# Patient Record
Sex: MALE | Race: WHITE | NOT HISPANIC OR LATINO | Employment: OTHER | URBAN - METROPOLITAN AREA
[De-identification: names, ages, dates, MRNs, and addresses within clinical notes are randomized per-mention and may not be internally consistent; named-entity substitution may affect disease eponyms.]

---

## 2017-04-04 ENCOUNTER — GENERIC CONVERSION - ENCOUNTER (OUTPATIENT)
Dept: OTHER | Facility: OTHER | Age: 72
End: 2017-04-04

## 2017-04-04 LAB — GLUCOSE SERPL-MCNC: 102 MG/DL

## 2017-04-10 ENCOUNTER — ALLSCRIPTS OFFICE VISIT (OUTPATIENT)
Dept: OTHER | Facility: OTHER | Age: 72
End: 2017-04-10

## 2017-08-07 DIAGNOSIS — R60.9 EDEMA: ICD-10-CM

## 2017-08-07 DIAGNOSIS — M10.9 GOUT: ICD-10-CM

## 2017-08-07 DIAGNOSIS — I12.9 HYPERTENSIVE CHRONIC KIDNEY DISEASE WITH STAGE 1 THROUGH STAGE 4 CHRONIC KIDNEY DISEASE, OR UNSPECIFIED CHRONIC KIDNEY DISEASE: ICD-10-CM

## 2017-08-07 DIAGNOSIS — M16.9 OSTEOARTHRITIS OF HIP: ICD-10-CM

## 2017-08-08 ENCOUNTER — LAB CONVERSION - ENCOUNTER (OUTPATIENT)
Dept: OTHER | Facility: OTHER | Age: 72
End: 2017-08-08

## 2017-08-08 LAB
A/G RATIO (HISTORICAL): 1.7 (CALC) (ref 1–2.5)
ALBUMIN SERPL BCP-MCNC: 3.7 G/DL (ref 3.6–5.1)
ALP SERPL-CCNC: 48 U/L (ref 40–115)
ALT SERPL W P-5'-P-CCNC: 16 U/L (ref 9–46)
AST SERPL W P-5'-P-CCNC: 12 U/L (ref 10–35)
BILIRUB SERPL-MCNC: 0.9 MG/DL (ref 0.2–1.2)
BUN SERPL-MCNC: 18 MG/DL (ref 7–25)
BUN/CREA RATIO (HISTORICAL): 12 (CALC) (ref 6–22)
CALCIUM SERPL-MCNC: 8.9 MG/DL (ref 8.6–10.3)
CHLORIDE SERPL-SCNC: 107 MMOL/L (ref 98–110)
CO2 SERPL-SCNC: 27 MMOL/L (ref 20–31)
CREAT SERPL-MCNC: 1.51 MG/DL (ref 0.7–1.18)
DEPRECATED RDW RBC AUTO: 13.1 % (ref 11–15)
EGFR AFRICAN AMERICAN (HISTORICAL): 53 ML/MIN/1.73M2
EGFR-AMERICAN CALC (HISTORICAL): 46 ML/MIN/1.73M2
GAMMA GLOBULIN (HISTORICAL): 2.2 G/DL (CALC) (ref 1.9–3.7)
GLUCOSE (HISTORICAL): 92 MG/DL (ref 65–99)
HCT VFR BLD AUTO: 38.2 % (ref 38.5–50)
HGB BLD-MCNC: 12.8 G/DL (ref 13.2–17.1)
MCH RBC QN AUTO: 29.8 PG (ref 27–33)
MCHC RBC AUTO-ENTMCNC: 33.5 G/DL (ref 32–36)
MCV RBC AUTO: 89 FL (ref 80–100)
PLATELET # BLD AUTO: 287 THOUSAND/UL (ref 140–400)
PMV BLD AUTO: 9.2 FL (ref 7.5–12.5)
POTASSIUM SERPL-SCNC: 4.2 MMOL/L (ref 3.5–5.3)
RBC # BLD AUTO: 4.29 MILLION/UL (ref 4.2–5.8)
SODIUM SERPL-SCNC: 141 MMOL/L (ref 135–146)
TOTAL PROTEIN (HISTORICAL): 5.9 G/DL (ref 6.1–8.1)
URIC ACID (HISTORICAL): 7.4 MG/DL (ref 4–8)
WBC # BLD AUTO: 5.5 THOUSAND/UL (ref 3.8–10.8)

## 2017-08-28 ENCOUNTER — ALLSCRIPTS OFFICE VISIT (OUTPATIENT)
Dept: OTHER | Facility: OTHER | Age: 72
End: 2017-08-28

## 2017-11-16 ENCOUNTER — LAB CONVERSION - ENCOUNTER (OUTPATIENT)
Dept: OTHER | Facility: OTHER | Age: 72
End: 2017-11-16

## 2017-11-16 LAB
A/G RATIO (HISTORICAL): 1.7 (CALC) (ref 1–2.5)
ALBUMIN SERPL BCP-MCNC: 4 G/DL (ref 3.6–5.1)
ALP SERPL-CCNC: 56 U/L (ref 40–115)
ALT SERPL W P-5'-P-CCNC: 15 U/L (ref 9–46)
AST SERPL W P-5'-P-CCNC: 14 U/L (ref 10–35)
BILIRUB SERPL-MCNC: 1.1 MG/DL (ref 0.2–1.2)
BUN SERPL-MCNC: 17 MG/DL (ref 7–25)
BUN/CREA RATIO (HISTORICAL): 11 (CALC) (ref 6–22)
CALCIUM SERPL-MCNC: 9.2 MG/DL (ref 8.6–10.3)
CHLORIDE SERPL-SCNC: 106 MMOL/L (ref 98–110)
CO2 SERPL-SCNC: 27 MMOL/L (ref 20–31)
CREAT SERPL-MCNC: 1.52 MG/DL (ref 0.7–1.18)
DEPRECATED RDW RBC AUTO: 12.7 % (ref 11–15)
EGFR AFRICAN AMERICAN (HISTORICAL): 53 ML/MIN/1.73M2
EGFR-AMERICAN CALC (HISTORICAL): 45 ML/MIN/1.73M2
GAMMA GLOBULIN (HISTORICAL): 2.3 G/DL (CALC) (ref 1.9–3.7)
GLUCOSE (HISTORICAL): 93 MG/DL (ref 65–99)
HCT VFR BLD AUTO: 40.8 % (ref 38.5–50)
HGB BLD-MCNC: 14 G/DL (ref 13.2–17.1)
MCH RBC QN AUTO: 30.6 PG (ref 27–33)
MCHC RBC AUTO-ENTMCNC: 34.3 G/DL (ref 32–36)
MCV RBC AUTO: 89.1 FL (ref 80–100)
PLATELET # BLD AUTO: 291 THOUSAND/UL (ref 140–400)
PMV BLD AUTO: 9.1 FL (ref 7.5–12.5)
POTASSIUM SERPL-SCNC: 4.3 MMOL/L (ref 3.5–5.3)
RBC # BLD AUTO: 4.58 MILLION/UL (ref 4.2–5.8)
SODIUM SERPL-SCNC: 139 MMOL/L (ref 135–146)
TOTAL PROTEIN (HISTORICAL): 6.3 G/DL (ref 6.1–8.1)
WBC # BLD AUTO: 6.1 THOUSAND/UL (ref 3.8–10.8)

## 2017-12-04 ENCOUNTER — ALLSCRIPTS OFFICE VISIT (OUTPATIENT)
Dept: OTHER | Facility: OTHER | Age: 72
End: 2017-12-04

## 2017-12-05 NOTE — PROGRESS NOTES
Assessment    1  Gouty arthropathy (274 00) (M10 9)   2  Peripheral edema (782 3) (R60 9)   3  Benign hypertension with chronic kidney disease, stage III (403 10,585 3) (I12 9,N18 3)   4  Degenerative arthritis of hip (715 95) (M16 9)    Plan  Benign hypertension with chronic kidney disease, stage III, Gouty arthropathy, Peripheral edema    · (1) CBC/ PLT (NO DIFF); Status:Active; Requested for:01May2018;    · (1) COMPREHENSIVE METABOLIC PANEL; Status:Active; Requested for:01May2018;    · (1) URIC ACID; Status:Active; Requested for:01May2018;   PMH: Screening for depression    · *VB-Depression Screening ; every 1 year; Last 76WTR4092; Next 74CMZ9380; Status:Active  PMH: Screening for genitourinary condition    · *VB - Urinary Incontinence Screen (Dx Z13 89 Screen for UI) ; every 1 year; Last 06FWZ1694; ICKS65TGN7000; Status:Active  PMH: Screening for malignant neoplasm of colon    · COLONOSCOPY; Status:Active; Requested for:04Dec2017;   PMH: Screening for neurological condition    · Falls Risk Assessment (Dx Z13 89 Screen for Neurologic Disorder) ; every 1 year; Last 62Pph1565;Next 41ECR7395; Status:Active  Screening for genitourinary condition    · *VB - Urinary Incontinence Screen (Dx Z13 89 Screen for UI); Status:Complete - Retrospective ByProtocol Authorization;   Done: 60FWA4148 10:20AM    Discussion/Summary  Discussion Summary:   Medications were renewed  The patient was given prescriptions for follow-up lab work  He will check with his insurer recurrent coverage for Zostavax  He will receive a Prevnar 13 immunization at his next office visit  Chief Complaint  Chief Complaint Free Text Note Form: Patient is here for 4 months f/u for Hypertension, arthritis, Gouty arthropathy and peripheral edema  blood work  does not have any new complains at the moment  Chief Complaint Chronic Condition [facility]: Patient is here today for follow up of chronic conditions described in HPI        History of Present Illness  HPI: Patient presents for follow-up visit and states to be doing well  His arthritic hips are feeling better  His peripheral edema has improved  His gouty arthropathy symptoms have improved with the addition of allopurinol  He denies any problems with his antihypertensive medications  He denies any dizziness, headaches, visual symptoms, numbness tingling or weakness of his extremities, difficulty speaking  Appetite is good  Urination is good he denies any problems with nocturia  His edema is stable  Labs are reviewed  Renal function has remained essentially unchanged  BUN is 17 creatinine 1 52  His most recent uric acid level was 7 4  Fasting glucose 93  CBC is stable white count 6100 hemoglobin 14  Platelets [id number]      Review of Systems  Complete-Male:  Constitutional: No fever or chills, feels well, no tiredness, no recent weight gain or weight loss  Eyes: No complaints of eye pain, no red eyes, no discharge from eyes, no itchy eyes  ENT: no complaints of earache, no hearing loss, no nosebleeds, no nasal discharge, no sore throat, no hoarseness  Cardiovascular: lower extremity edema  Respiratory: No complaints of shortness of breath, no wheezing, no cough, no SOB on exertion, no orthopnea or PND  Gastrointestinal: No complaints of abdominal pain, no constipation, no nausea or vomiting, no diarrhea or bloody stools  Genitourinary: No complaints of dysuria, no incontinence, no hesitancy, no nocturia, no genital lesion, no testicular pain  Musculoskeletal: arthralgias-- and-- In general, hips are improved  Gouty arthropathy has resolved  Integumentary: No complaints of skin rash or skin lesions, no itching, no skin wound, no dry skin  Neurological: No compliants of headache, no confusion, no convulsions, no numbness or tingling, no dizziness or fainting, no limb weakness, no difficulty walking    Psychiatric: Is not suicidal, no sleep disturbances, no anxiety or depression, no change in personality, no emotional problems  Endocrine: No complaints of proptosis, no hot flashes, no muscle weakness, no erectile dysfunction, no deepening of the voice, no feelings of weakness  Active Problems  1  Benign hypertension with chronic kidney disease, stage III (403 10,585 3) (I12 9,N18 3)   2  Degenerative arthritis of hip (715 95) (M16 9)   3  Gouty arthropathy (274 00) (M10 9)   4  Osteoarthritis (715 90) (M19 90)   5  Peripheral edema (782 3) (R60 9)   6  PVC (premature ventricular contraction) (427 69) (I49 3)   7  Screening for genitourinary condition (V81 6) (Z13 89)    Past Medical History  1  History of Acute bronchitis (466 0) (J20 9)   2  History of renal calculi (V13 01) (Z87 442)   3  History of Impacted cerumen of both ears (380 4) (H61 23)   4  History of Left thigh pain (729 5) (M79 652)   5  History of Right otitis media (382 9) (H66 91)   6  History of Urinary problem (V47 4) (R39 89)   7  History of Uveitis (364 3) (H20 9)  Active Problems And Past Medical History Reviewed: The active problems and past medical history were reviewed and updated today  Surgical History  1  History of Tonsillectomy    Family History  Mother    1  Family history of malignant neoplasm of breast (V16 3) (Z80 3)  Father    2  Family history of coronary artery disease (V17 3) (Z82 49)  Family History Reviewed: The family history was reviewed and updated today  Social History     · Current some day smoker (305 1) (F17 200)   · No alcohol use   · No illicit drug use   · Tobacco use (305 1) (Z72 0)  Social History Reviewed: The social history was reviewed and updated today  The social history was reviewed and is unchanged  Current Meds   1  Allopurinol 100 MG Oral Tablet; TAKE 1 TABLET EVERY MORNING; Therapy: 23Ztd9202 to (Evaluate:27Qid5366)  Requested for: 41FJI1859; Last Rx:04Oct2017 Ordered   2  Furosemide 20 MG Oral Tablet; TAKE 1 TABLET DAILY;  Therapy: 64Now5875 to (Evaluate:30Jan2018)  Requested for: 83DRZ2532; Last Rx:47Xvr8540 Ordered   3  Lisinopril 5 MG Oral Tablet; take 1 tablet every day; Therapy: 02Iou2126 to (Rose Bear)  Requested for: 44OGI2956; Last Rx:06Nov2017 Ordered   4  Potassium Chloride ER 10 MEQ Oral Capsule Extended Release; TAKE 1 CAPSULE DAILY; Therapy: 93Kyt1888 to (Evaluate:30Apr2018)  Requested for: 19TEN5464; Last Rx:01Nov2017; Status: ACTIVE - Renewal Denied Ordered   5  Tamsulosin HCl - 0 4 MG Oral Capsule; take 1 capsule daily; Therapy: (Rachelle Sat) to Recorded   6  Tramadol-Acetaminophen 37 5-325 MG Oral Tablet; TAKE 2 TABLET Every 6 hours PRN; Therapy: 49WLS6911 to (Evaluate:09Nov2017); Last Rx:01Wbc6762 Ordered  Medication List Reviewed: The medication list was reviewed and updated today  Allergies  1  No Known Drug Allergies    Vitals  Vital Signs    Recorded: 82UKT1805 09:59AM   Temperature 98 3 F, Oral   Heart Rate 88   Systolic 955, LUE, Sitting   Diastolic 74, LUE, Sitting   Height 5 ft 7 in   Weight 200 lb 6 oz   BMI Calculated 31 38   BSA Calculated 2 02   O2 Saturation 97       Physical Exam   Constitutional  General appearance: No acute distress, well appearing and well nourished  Eyes  Conjunctiva and lids: No swelling, erythema, or discharge  Pupils and irises: Equal, round and reactive to light  Ears, Nose, Mouth, and Throat  External inspection of ears and nose: Normal    Pulmonary  Respiratory effort: No increased work of breathing or signs of respiratory distress  Auscultation of lungs: Clear to auscultation, equal breath sounds bilaterally, no wheezes, no rales, no rhonci  Cardiovascular  Palpation of heart: Normal PMI, no thrills  Auscultation of heart: Normal rate and rhythm, normal S1 and S2, without murmurs  Examination of extremities for edema and/or varicosities: Abnormal  -- 1+ pretibial edema on the left, trace pretibial edema  Carotid pulses: Normal    Abdomen  Abdomen: Non-tender, no masses     Lymphatic  Palpation of lymph nodes in neck: No lymphadenopathy  Musculoskeletal  Gait and station: Normal    Digits and nails: Normal without clubbing or cyanosis  Inspection/palpation of joints, bones, and muscles: Normal    Skin  Skin and subcutaneous tissue: Normal without rashes or lesions  Neurologic  Cranial nerves: Cranial nerves 2-12 intact  Reflexes: 2+ and symmetric  Sensation: No sensory loss  Psychiatric  Orientation to person, place and time: Normal    Mood and affect: Normal          Results/Data  PHQ-2 Adult Depression Screening 88RPK3231 10:21AM User, [employer]     Test Name Result Flag Reference   PHQ-2 Adult Depression Score 0       Over the last two weeks, how often have you been bothered by any of the following problems? Little interest or pleasure in doing things: Not at all - 0 Feeling down, depressed, or hopeless: Not at all - 0   PHQ-2 Adult Depression Screening Negative       Falls Risk Assessment (Dx Z13 89 Screen for Neurologic Disorder) 74ZHO0851 10:21AM User, [employer]     Test Name Result Flag Reference   Falls Risk      No falls in the past year     *VB - Urinary Incontinence Screen (Dx Z13 89 Screen for UI) 93OEM6046 10:20AM [place]     Test Name Result Flag Reference   Urinary Incontinence Assessment 35JXN4706       (1) COMPREHENSIVE METABOLIC PANEL 06PJA0587 56:96OP Johnye [place]     Test Name Result Flag Reference   GLUCOSE 93 mg/dL  65-99   Fasting reference interval   UREA NITROGEN (BUN) 17 mg/dL  7-25   CREATININE 1 52 mg/dL H 0 70-1 18     For patients >52years of age, the reference limit for Creatinine is approximately 13% higher for people identified as -American  eGFR NON-AFR   AMERICAN 45 mL/min/1 73m2 L > OR = 60   eGFR AFRICAN AMERICAN 53 mL/min/1 73m2 L > OR = 60   BUN/CREATININE RATIO 11 (calc)  6-22   SODIUM 139 mmol/L  135-146   POTASSIUM 4 3 mmol/L  3 5-5 3   CHLORIDE 106 mmol/L     CARBON DIOXIDE 27 mmol/L  20-31   CALCIUM 9 2 mg/dL  8 6-10 3   PROTEIN, TOTAL 6 3 g/dL  6 1-8 1   ALBUMIN 4 0 g/dL  3 6-5 1   GLOBULIN 2 3 g/dL (calc)  1 9-3 7   ALBUMIN/GLOBULIN RATIO 1 7 (calc)  1 0-2 5   BILIRUBIN, TOTAL 1 1 mg/dL  0 2-1 2   ALKALINE PHOSPHATASE 56 U/L     AST 14 U/L  10-35   ALT 15 U/L  9-46     (Q) CBC (H/H, RBC, INDICES, WBC, PLT) 32EGE3666 07:05AM Urbano Bird     REPORT COMMENT: FASTING:YES     Test Name Result Flag Reference   WHITE BLOOD CELL COUNT 6 1 Thousand/uL  3 8-10 8   RED BLOOD CELL COUNT 4 58 Million/uL  4 20-5 80   HEMOGLOBIN 14 0 g/dL  13 2-17 1   HEMATOCRIT 40 8 %  38 5-50 0   MCV 89 1 fL  80 0-100 0   MCH 30 6 pg  27 0-33 0   MCHC 34 3 g/dL  32 0-36 0   RDW 12 7 %  11 0-15 0   PLATELET COUNT 969 Thousand/uL  140-400   MPV 9 1 fL  7 5-12 5       Health Management  History of Screening for depression   *VB-Depression Screening; every 1 year; Last 08MRT5337; Next Due: 71MNF1684; Active  History of Screening for genitourinary condition   *VB - Urinary Incontinence Screen (Dx Z13 89 Screen for UI); every 1 year; Last 60YEK2692; NextDue: 57AMT5905; Active  History of Screening for neurological condition   Falls Risk Assessment (Dx Z13 89 Screen for Neurologic Disorder); every 1 year; Last 14Xud8306;Next Due: 88FKL5617; Active  Health Maintenance   Medicare Annual Wellness Visit; every 1 year; Last 63YAR1291; Next Due: 66FOO1032;  Overdue    Future Appointments    Date/Time Provider Specialty Site   05/14/2018 09:15 AM [facility]arg       Signatures   Electronically signed by : Siena Mejia MD; Dec  4 2017 11:06AM EST                       (Author)

## 2018-01-13 VITALS
BODY MASS INDEX: 32.98 KG/M2 | HEIGHT: 67 IN | TEMPERATURE: 98.7 F | OXYGEN SATURATION: 98 % | WEIGHT: 210.13 LBS | SYSTOLIC BLOOD PRESSURE: 142 MMHG | DIASTOLIC BLOOD PRESSURE: 82 MMHG | HEART RATE: 89 BPM

## 2018-01-14 VITALS
HEIGHT: 67 IN | HEART RATE: 85 BPM | OXYGEN SATURATION: 95 % | TEMPERATURE: 98.5 F | WEIGHT: 207.13 LBS | BODY MASS INDEX: 32.51 KG/M2

## 2018-01-23 VITALS
WEIGHT: 200.38 LBS | DIASTOLIC BLOOD PRESSURE: 74 MMHG | OXYGEN SATURATION: 97 % | HEIGHT: 67 IN | BODY MASS INDEX: 31.45 KG/M2 | HEART RATE: 88 BPM | TEMPERATURE: 98.3 F | SYSTOLIC BLOOD PRESSURE: 120 MMHG

## 2018-01-23 NOTE — PROGRESS NOTES
Assessment   1  Gouty arthropathy (274 00) (M10 9)  2  Peripheral edema (782 3) (R60 9)  3  Benign hypertension with chronic kidney disease, stage III (403 10,585 3) (I12 9,N18 3)  4  Degenerative arthritis of hip (715 95) (M16 9)    Plan  Benign hypertension with chronic kidney disease, stage III, Gouty arthropathy, Peripheral  edema    · (1) CBC/ PLT (NO DIFF); Status:Active; Requested for:01May2018;    · (1) COMPREHENSIVE METABOLIC PANEL; Status:Active; Requested for:01May2018;    · (1) URIC ACID; Status:Active; Requested for:01May2018;   PMH: Screening for depression    · *VB-Depression Screening ; every 1 year; Last 82ZBO6425; Next 10HVX0330;  Status:Active  PMH: Screening for genitourinary condition    · *VB - Urinary Incontinence Screen (Dx Z13 89 Screen for UI) ; every 1 year; Last  50ROF1063; Next 69PGV1298; Status:Active  PMH: Screening for malignant neoplasm of colon    · COLONOSCOPY; Status:Active; Requested for:44Syl7782;   PMH: Screening for neurological condition    · Falls Risk Assessment (Dx Z13 89 Screen for Neurologic Disorder) ; every 1 year; Last  70NWB1460; Next 71TVI5260; Status:Active  Screening for genitourinary condition    · *VB - Urinary Incontinence Screen (Dx Z13 89 Screen for UI); Status:Complete -  Retrospective By Protocol Authorization;   Done: 33TOY7932 10:20AM    Discussion/Summary  Impression: Subsequent Annual Wellness Visit  Cardiovascular screening and counseling: the risks and benefits of screening were discussed and screening is current  Diabetes screening and counseling: the risks and benefits of screening were discussed and screening is current  Colorectal cancer screening and counseling: the risks and benefits of screening were discussed and screening is current  Prostate cancer screening and counseling: the risks and benefits of screening were discussed and screening is current     Osteoporosis screening and counseling: the risks and benefits of screening were discussed and screening not indicated  Abdominal aortic aneurysm screening and counseling: the risks and benefits of screening were discussed and screening not indicated  Glaucoma screening and counseling: the risks and benefits of screening were discussed and screening is current  HIV screening and counseling: the risks and benefits of screening were discussed and screening is current  Immunizations: the risks and benefits of influenza vaccination were discussed with the patient, influenza vaccine is up to date this year, the risks and benefits of pneumococcal vaccination were discussed with the patient, Needs Prevnar 13, hepatitis B prevention counseling was provided, hepatitis B vaccination series is not indicated at this time due to the patient's low risk of sylvie the disease, the risks and benefits of the Zostavax vaccine were discussed with the patient, the patient declines the Zostavax vaccine, Zostavax vaccine needed today, Will check with insurer, the risks and benefits of the Td vaccine were discussed with the patient, the patient declines the Td vaccine, Td vaccine needed today, the risks and benefits of the Tdap vaccine were discussed with the patient, the patient declines the Tdap vaccine and Tdap vaccine needed today  Patient Discussion: follow-up visit needed in 1 year  Chief Complaint  Patient is here for AWV exam       History of Present Illness  Welcome to Medicare and Wellness Visits: The patient is being seen for the initial annual wellness visit  Medicare Screening and Risk Factors   Hospitalizations: no previous hospitalizations  Medicare Screening Tests Risk Questions   Abdominal aortic aneurysm risk assessment: none indicated  Osteoporosis risk assessment: over 48years of age, but none indicated  HIV risk assessment: none indicated  Drug and Alcohol Use: The patient has never smoked cigarettes  The patient reports never drinking alcohol  Alcohol concern:    The patient has no concerns about alcohol abuse  He has never used illicit drugs  Diet and Physical Activity: Current diet includes unhealthy food choices, limited junk food, 1 servings of fruit per day, 1 cups of coffee per day and 1 cans of diet soda per day  Exercise: walking 45 minutes per day  Mood Disorder and Cognitive Impairment Screening: PHQ-9 Depression Scale   Over the past 2 weeks, how often have you been bothered by the following problems? 1 ) Little interest or pleasure in doing things? Not at all    2 ) Feeling down, depressed or hopeless? Not at all    3 ) Trouble falling asleep or sleeping too much? Several days  4 ) Feeling tired or having little energy? Several days  5 ) Poor appetite or overeating? Not at all    6 ) Feeling bad about yourself, or that you are a failure, or have let yourself or your family down? Not at all    7 ) Trouble concentrating on things, such as reading a newspaper or watching television? Not at all    8 ) Moving or speaking so slowly that other people could have noticed, or the opposite, moving or speaking faster than usual? Not at all    9 ) Thoughts that you would be off dead or of hurting yourself in some way? Not at all  TOTAL SCORE: 2  Functional Ability/Level of Safety: Hearing is slightly decreased  He does not use a hearing aid  The patient is currently able to do activities of daily living without limitations, able to do instrumental activities of daily living without limitations, able to participate in social activities without limitations and able to drive without limitations  Activities of daily living details: does not need help using the phone, no transportation help needed, does not need help shopping, no meal preparation help needed, does not need help doing housework, does not need help doing laundry, does not need help managing medications and does not need help managing money  Fall risk factors:   The patient fell 0 times in the past 12 months  Injury History: no polypharmacy, no alcohol use, no mobility impairment, no antidepressant use, no deconditioning, no postural hypotension, no sedative use, no visual impairment, no urinary incontinence, antihypertensive use, no cognitive impairment, up and go test was normal and no previous fall  Home safety risk factors:  no unfamiliar surroundings, no loose rugs, no uneven floors, no household clutter, grab bars in the bathroom and handrails on the stairs  Advance Directives: Advance directives: no living will, no durable power of  for health care directives and no advance directives  end of life decisions were reviewed with the patient and I agree with the patient's decisions  Co-Managers and Medical Equipment/Suppliers: See Patient Care Team      Patient Care Team    Care Team Member Role Specialty Office Number         Vida Welsh MD  Ophthalmology [phone]   Anneliese Romeo MD  Urology [phone]     Review of Systems    Constitutional: negative  Eyes: negative  ENT: negative  Cardiovascular: lower extremity edema  Respiratory: negative  Gastrointestinal: negative  Genitourinary: negative  Musculoskeletal: Hip pain  Integumentary and Breasts: negative  Neurological: negative  Psychiatric: negative  Endocrine: negative  Hematologic and Lymphatic: negative  Active Problems   1  Benign hypertension with chronic kidney disease, stage III (403 10,585 3) (I12 9,N18 3)  2  Degenerative arthritis of hip (715 95) (M16 9)  3  Gouty arthropathy (274 00) (M10 9)  4  Osteoarthritis (715 90) (M19 90)  5  Peripheral edema (782 3) (R60 9)  6  PVC (premature ventricular contraction) (427 69) (I49 3)  7   Screening for genitourinary condition (V81 6) (Z13 89)    Past Medical History    · History of Acute bronchitis (466 0) (J20 9)   · History of renal calculi (V13 01) (Z87 442)   · History of Impacted cerumen of both ears (380 4) (H61 23)   · History of Left thigh pain (729 5) (M79 652)   · History of Right otitis media (382 9) (H66 91)   · History of Urinary problem (V47 4) (R39 89)   · History of Uveitis (364 3) (H20 9)    Surgical History    · History of Tonsillectomy    Family History  Mother    · Family history of malignant neoplasm of breast (V16 3) (Z80 3)  Father    · Family history of coronary artery disease (V17 3) (Z82 49)    Social History    · Current some day smoker (305 1) (F17 200)   · No alcohol use   · No illicit drug use   · Tobacco use (305 1) (Z72 0)    Current Meds  1  Allopurinol 100 MG Oral Tablet; TAKE 1 TABLET EVERY MORNING; Therapy: 12Nzc3295 to (Evaluate:65Nut8225)  Requested for: 55OTQ2569; Last   Rx:48Rco6012 Ordered  2  Furosemide 20 MG Oral Tablet; TAKE 1 TABLET DAILY; Therapy: 17Bav3392 to (Evaluate:30Jan2018)  Requested for: 80Qfa3604; Last   Rx:54Xqf1867 Ordered  3  Lisinopril 5 MG Oral Tablet; take 1 tablet every day; [facility]44 [facility]  Requested for: 86JCY3225; Last   Rx:25Ttk2443 Ordered  4  Potassium Chloride ER 10 MEQ Oral Capsule Extended Release; TAKE 1 CAPSULE   DAILY; Therapy: 93Gwx0361 to (Evaluate:30Apr2018)  Requested for: 72DPD0364; Last   Rx:01Nov2017; Status: ACTIVE - Renewal Denied Ordered  5  Tamsulosin HCl - 0 4 MG Oral Capsule; take 1 capsule daily; Therapy: (Anup Fortune) to Recorded  6  Tramadol-Acetaminophen 37 5-325 MG Oral Tablet; TAKE 2 TABLET Every 6 hours PRN; Therapy: 67XOF6761 to (Evaluate:09Nov2017); Last Rx:91Gzn8413 Ordered    The medication list was reviewed and updated today  Allergies   1   No Known Drug Allergies    Immunizations   1 2 3    Influenza  Temporarily Deferred: Patient reports item recently done, As of: 29Yoc0691, Defer for 1 Years 16-Sep-2014 16-Oct-2015    PPSV  22-Sep-2014       Vitals  Signs    Temperature: 98 3 F, Oral  Heart Rate: 88  Systolic: 089, LUE, Sitting  Diastolic: 74, LUE, Sitting  Height: 5 ft 7 in  Weight: 200 lb 6 oz  BMI Calculated: 31 38  BSA Calculated: 2 02  O2 Saturation: 97    Results/Data  PHQ-2 Adult Depression Screening 85ABD2913 10:21AM User, [facility]     Test Name Result Flag Reference   PHQ-2 Adult Depression Score 0     Over the last two weeks, how often have you been bothered by any of the following problems? Little interest or pleasure in doing things: Not at all - 0  Feeling down, depressed, or hopeless: Not at all - 0   PHQ-2 Adult Depression Screening Negative       Falls Risk Assessment (Dx Z13 89 Screen for Neurologic Disorder) 46HUZ5531 10:21AM User, [facility]     Test Name Result Flag Reference   Falls Risk      No falls in the past year     *VB - Urinary Incontinence Screen (Dx Z13 89 Screen for UI) 39XBF0963 10:20AM Kendra Stringer     Test Name Result Flag Reference   Urinary Incontinence Assessment 30VYY4886         Health Management  History of Screening for depression   *VB-Depression Screening; every 1 year; Last 61BST0808; Next Due: 42MFT2975; Active  History of Screening for genitourinary condition   *VB - Urinary Incontinence Screen (Dx Z13 89 Screen for UI); every 1 year; Last  58EFP7382; Next Due: 42XBV3125; Active  History of Screening for neurological condition   Falls Risk Assessment (Dx Z13 89 Screen for Neurologic Disorder); every 1 year; Last  47ZQE2872; Next Due: 19DSN6197; Active  Health Maintenance   Medicare Annual Wellness Visit; every 1 year; Last 01ALB0701; Next Due: 79GZB9968;   Overdue    Future Appointments    Date/Time Provider Specialty Site   05/14/2018 09:15 AM Kendra Stringer MD Internal Medicine [facility] [place]     Signatures   Electronically signed by : Felipe Stewart MD; Dec  4 2017 11:07AM EST                       (Author)

## 2018-04-02 DIAGNOSIS — M16.9 OSTEOARTHRITIS OF HIP, UNSPECIFIED LATERALITY, UNSPECIFIED OSTEOARTHRITIS TYPE: Primary | ICD-10-CM

## 2018-04-30 DIAGNOSIS — M10.9 GOUT, ARTHROPATHY: Primary | ICD-10-CM

## 2018-04-30 DIAGNOSIS — I12.9 BENIGN HYPERTENSION WITH CHRONIC KIDNEY DISEASE: ICD-10-CM

## 2018-04-30 RX ORDER — ALLOPURINOL 100 MG/1
1 TABLET ORAL
COMMUNITY
Start: 2017-04-10 | End: 2018-04-30 | Stop reason: SDUPTHER

## 2018-04-30 RX ORDER — POTASSIUM CHLORIDE 750 MG/1
10 CAPSULE, EXTENDED RELEASE ORAL DAILY
Qty: 30 CAPSULE | Refills: 3 | Status: SHIPPED | OUTPATIENT
Start: 2018-04-30 | End: 2018-10-22 | Stop reason: SDUPTHER

## 2018-04-30 RX ORDER — ALLOPURINOL 100 MG/1
100 TABLET ORAL DAILY
Qty: 30 TABLET | Refills: 3 | Status: SHIPPED | OUTPATIENT
Start: 2018-04-30 | End: 2018-07-02 | Stop reason: SDUPTHER

## 2018-04-30 RX ORDER — POTASSIUM CHLORIDE 750 MG/1
1 CAPSULE, EXTENDED RELEASE ORAL DAILY
COMMUNITY
Start: 2016-08-22 | End: 2018-04-30 | Stop reason: SDUPTHER

## 2018-05-01 DIAGNOSIS — R60.9 EDEMA: ICD-10-CM

## 2018-05-01 DIAGNOSIS — M10.9 GOUT: ICD-10-CM

## 2018-05-01 DIAGNOSIS — I12.9 HYPERTENSIVE CHRONIC KIDNEY DISEASE WITH STAGE 1 THROUGH STAGE 4 CHRONIC KIDNEY DISEASE, OR UNSPECIFIED CHRONIC KIDNEY DISEASE: ICD-10-CM

## 2018-05-01 LAB
ALBUMIN SERPL-MCNC: 4 G/DL (ref 3.6–5.1)
ALBUMIN/GLOB SERPL: 1.7 (CALC) (ref 1–2.5)
ALP SERPL-CCNC: 56 U/L (ref 40–115)
ALT SERPL-CCNC: 12 U/L (ref 9–46)
AST SERPL-CCNC: 12 U/L (ref 10–35)
BILIRUB SERPL-MCNC: 1.1 MG/DL (ref 0.2–1.2)
BUN SERPL-MCNC: 17 MG/DL (ref 7–25)
BUN/CREAT SERPL: 12 (CALC) (ref 6–22)
CALCIUM SERPL-MCNC: 9.3 MG/DL (ref 8.6–10.3)
CHLORIDE SERPL-SCNC: 107 MMOL/L (ref 98–110)
CO2 SERPL-SCNC: 29 MMOL/L (ref 20–31)
CREAT SERPL-MCNC: 1.47 MG/DL (ref 0.7–1.18)
ERYTHROCYTE [DISTWIDTH] IN BLOOD BY AUTOMATED COUNT: 13 % (ref 11–15)
GLOBULIN SER CALC-MCNC: 2.4 G/DL (CALC) (ref 1.9–3.7)
GLUCOSE SERPL-MCNC: 96 MG/DL (ref 65–99)
HCT VFR BLD AUTO: 42.4 % (ref 38.5–50)
HGB BLD-MCNC: 14.5 G/DL (ref 13.2–17.1)
MCH RBC QN AUTO: 30.5 PG (ref 27–33)
MCHC RBC AUTO-ENTMCNC: 34.2 G/DL (ref 32–36)
MCV RBC AUTO: 89.1 FL (ref 80–100)
PLATELET # BLD AUTO: 290 THOUSAND/UL (ref 140–400)
PMV BLD REES-ECKER: 9.1 FL (ref 7.5–12.5)
POTASSIUM SERPL-SCNC: 4.9 MMOL/L (ref 3.5–5.3)
PROT SERPL-MCNC: 6.4 G/DL (ref 6.1–8.1)
RBC # BLD AUTO: 4.76 MILLION/UL (ref 4.2–5.8)
SL AMB EGFR AFRICAN AMERICAN: 55 ML/MIN/1.73M2
SL AMB EGFR NON AFRICAN AMERICAN: 47 ML/MIN/1.73M2
SODIUM SERPL-SCNC: 141 MMOL/L (ref 135–146)
URATE SERPL-MCNC: 6.9 MG/DL (ref 4–8)
WBC # BLD AUTO: 6.2 THOUSAND/UL (ref 3.8–10.8)

## 2018-07-02 ENCOUNTER — OFFICE VISIT (OUTPATIENT)
Dept: INTERNAL MEDICINE CLINIC | Facility: CLINIC | Age: 73
End: 2018-07-02
Payer: MEDICARE

## 2018-07-02 VITALS
SYSTOLIC BLOOD PRESSURE: 110 MMHG | DIASTOLIC BLOOD PRESSURE: 76 MMHG | HEART RATE: 94 BPM | OXYGEN SATURATION: 97 % | BODY MASS INDEX: 30.1 KG/M2 | TEMPERATURE: 98.1 F | WEIGHT: 203.2 LBS | HEIGHT: 69 IN

## 2018-07-02 DIAGNOSIS — M10.9 GOUT, ARTHROPATHY: ICD-10-CM

## 2018-07-02 DIAGNOSIS — I12.9 BENIGN HYPERTENSION WITH CHRONIC KIDNEY DISEASE, STAGE III (HCC): ICD-10-CM

## 2018-07-02 DIAGNOSIS — M16.12 PRIMARY OSTEOARTHRITIS OF LEFT HIP: ICD-10-CM

## 2018-07-02 DIAGNOSIS — R60.9 PERIPHERAL EDEMA: ICD-10-CM

## 2018-07-02 DIAGNOSIS — M10.9 GOUTY ARTHROPATHY: ICD-10-CM

## 2018-07-02 DIAGNOSIS — N18.30 BENIGN HYPERTENSION WITH CHRONIC KIDNEY DISEASE, STAGE III (HCC): ICD-10-CM

## 2018-07-02 DIAGNOSIS — Z12.11 SCREENING FOR COLON CANCER: Primary | ICD-10-CM

## 2018-07-02 PROCEDURE — 99214 OFFICE O/P EST MOD 30 MIN: CPT | Performed by: INTERNAL MEDICINE

## 2018-07-02 RX ORDER — HYDROCHLOROTHIAZIDE 25 MG/1
25 TABLET ORAL DAILY
Qty: 90 TABLET | Refills: 1 | Status: SHIPPED | OUTPATIENT
Start: 2018-07-02 | End: 2018-07-16 | Stop reason: ALTCHOICE

## 2018-07-02 RX ORDER — ALLOPURINOL 100 MG/1
100 TABLET ORAL DAILY
Qty: 90 TABLET | Refills: 1 | Status: SHIPPED | OUTPATIENT
Start: 2018-07-02 | End: 2019-01-08 | Stop reason: SDUPTHER

## 2018-07-02 NOTE — PROGRESS NOTES
Assessment/Plan:    Benign hypertension with chronic kidney disease, stage III  Blood pressure is well controlled on current therapy  Follow-up labs are scheduled for 6 months    Gouty arthropathy   Uric acid level 6 9  Patient is asymptomatic  Will continue current therapy    Degenerative arthritis of hip   No significant change in his arthritic symptoms  Peripheral edema    Peripheral edema persists  We will continue same dose of diuretics  External compression stockings were discussed the patient prefers not to use them       Diagnoses and all orders for this visit:    Screening for colon cancer  -     Ambulatory referral to Gastroenterology; Future    Gout, arthropathy  -     allopurinol (ZYLOPRIM) 100 mg tablet; Take 1 tablet (100 mg total) by mouth daily  -     hydrochlorothiazide (HYDRODIURIL) 25 mg tablet; Take 1 tablet (25 mg total) by mouth daily  -     Uric acid; Future    Peripheral edema  -     Comprehensive metabolic panel; Future  -     CBC and differential; Future    Primary osteoarthritis of left hip  -     Comprehensive metabolic panel; Future  -     CBC and differential; Future    Benign hypertension with chronic kidney disease, stage III  -     Comprehensive metabolic panel; Future  -     CBC and differential; Future    Gouty arthropathy          Subjective:      Patient ID: Maria Luisa Hill is a 68 y o  male  Patient presents for 6 month follow-up  He offers no new complaints  He continues to experience some discomfort in his left hip but states that it is tolerable and he does not think anything else needs to be done at this time  He has not experienced any episodes of gouty arthritis  He denies any headaches  His peripheral edema is persistent but it is not bothering him to any great degree  The hydrochlorothiazide has maintained sufficient control of his edema  He follows with Urology on a regular basis for his BPH    He has not had a colonoscopy screening for colon cancer  He denies any symptoms or changes in bowel habits  His weight has been stable and appetite has been good  Recent lab work was reviewed  CBC is normal   Creatinine is mildly elevated  Estimated GFR is 47  Serum electrolytes are normal liver function studies are normal   Uric acid level 6 9  Family History   Problem Relation Age of Onset    Breast cancer Mother     Coronary artery disease Father      Social History     Social History    Marital status:      Spouse name: N/A    Number of children: N/A    Years of education: N/A     Occupational History    Not on file  Social History Main Topics    Smoking status: Current Some Day Smoker    Smokeless tobacco: Not on file    Alcohol use No    Drug use: No    Sexual activity: Not on file     Other Topics Concern    Not on file     Social History Narrative    No narrative on file     Past Medical History:   Diagnosis Date    Benign hypertension with chronic kidney disease, stage III 8/22/2016    Degenerative arthritis of hip 3/2/2015    Impacted cerumen of both ears     last assessed-2/9/2015    Renal calculi     last assessed-9/22/2014    Urinary problem     last assessed-9/22/2014    Uveitis     last assessed-2/22/2016       Current Outpatient Prescriptions:     allopurinol (ZYLOPRIM) 100 mg tablet, Take 1 tablet (100 mg total) by mouth daily, Disp: 90 tablet, Rfl: 1    hydrochlorothiazide (HYDRODIURIL) 25 mg tablet, Take 1 tablet (25 mg total) by mouth daily, Disp: 90 tablet, Rfl: 1    potassium chloride (MICRO-K) 10 MEQ CR capsule, Take 1 capsule (10 mEq total) by mouth daily, Disp: 30 capsule, Rfl: 3    tamsulosin (FLOMAX) 0 4 mg, Take 0 4 mg by mouth daily at bedtime  , Disp: , Rfl:     traMADol-acetaminophen (ULTRACET) 37 5-325 mg per tablet, Take 2 tablets by mouth every 6 (six) hours as needed for moderate pain, Disp: 100 tablet, Rfl: 3  No Known Allergies  Past Surgical History:   Procedure Laterality Date  TONSILLECTOMY      last assessed-9/22/2014         Review of Systems   Constitutional: Negative for appetite change, chills, fatigue and unexpected weight change  HENT: Negative for hearing loss, sinus pain, sinus pressure and trouble swallowing  Eyes: Negative for visual disturbance  Respiratory: Negative for cough, chest tightness and shortness of breath  Cardiovascular: Negative for chest pain and leg swelling  Gastrointestinal: Negative for abdominal pain, constipation and diarrhea  Genitourinary: Negative for dysuria  Musculoskeletal: Positive for joint swelling (arthritis left hip)  Skin: Negative for rash  Neurological: Negative for dizziness, numbness and headaches  Psychiatric/Behavioral: Negative for decreased concentration and sleep disturbance  Objective:      /76 (BP Location: Left arm, Patient Position: Sitting, Cuff Size: Standard)   Pulse 94   Temp 98 1 °F (36 7 °C) (Oral)   Ht 5' 9" (1 753 m)   Wt 92 2 kg (203 lb 3 2 oz)   SpO2 97%   BMI 30 01 kg/m²          Physical Exam   Constitutional: He is oriented to person, place, and time  He appears well-developed  No distress  HENT:   Head: Normocephalic and atraumatic  Eyes: Right eye exhibits no discharge  Left eye exhibits no discharge  No scleral icterus  Neck: No JVD present  No tracheal deviation present  No thyromegaly present  Cardiovascular: Normal rate, regular rhythm, normal heart sounds and intact distal pulses  Exam reveals no gallop and no friction rub  No murmur heard  Pulmonary/Chest: Effort normal and breath sounds normal    Abdominal: Soft  Bowel sounds are normal  He exhibits no distension  There is no tenderness  There is no rebound  Musculoskeletal: He exhibits edema (bilateral 1+ pitting edema in legs)  Lymphadenopathy:     He has no cervical adenopathy  Neurological: He is alert and oriented to person, place, and time  He has normal reflexes     Skin: Skin is warm and dry  He is not diaphoretic

## 2018-07-09 ENCOUNTER — TELEPHONE (OUTPATIENT)
Dept: INTERNAL MEDICINE CLINIC | Facility: CLINIC | Age: 73
End: 2018-07-09

## 2018-07-09 NOTE — TELEPHONE ENCOUNTER
Pt stopped in the office, b/c he has bottles for both Lasix and HTCZ  Pt is confused as to what he should be taking  When reviewing his chart in [employer] I see that HCTZ was d/c 8/22/2016 and Lasix was started then  When meds were brought over into [facility] the wrong meds were brought in  Pt's should have been kept on Lasix instead of HCTZ  Will confirm with Dr Andrew Letters  Told pt I will call him back to notify him

## 2018-07-16 ENCOUNTER — DOCUMENTATION (OUTPATIENT)
Dept: INTERNAL MEDICINE CLINIC | Facility: CLINIC | Age: 73
End: 2018-07-16

## 2018-07-16 RX ORDER — FUROSEMIDE 20 MG/1
20 TABLET ORAL DAILY
COMMUNITY
End: 2019-04-15 | Stop reason: SDUPTHER

## 2018-09-17 ENCOUNTER — OFFICE VISIT (OUTPATIENT)
Dept: GASTROENTEROLOGY | Facility: CLINIC | Age: 73
End: 2018-09-17
Payer: MEDICARE

## 2018-09-17 VITALS
DIASTOLIC BLOOD PRESSURE: 70 MMHG | WEIGHT: 201.6 LBS | SYSTOLIC BLOOD PRESSURE: 112 MMHG | HEIGHT: 69 IN | HEART RATE: 90 BPM | TEMPERATURE: 97.9 F | BODY MASS INDEX: 29.86 KG/M2

## 2018-09-17 DIAGNOSIS — Z12.11 SCREENING FOR COLON CANCER: ICD-10-CM

## 2018-09-17 PROCEDURE — 1124F ACP DISCUSS-NO DSCNMKR DOCD: CPT | Performed by: PATHOLOGY

## 2018-09-17 PROCEDURE — 99201 PR OFFICE OUTPATIENT NEW 10 MINUTES: CPT | Performed by: INTERNAL MEDICINE

## 2018-09-17 NOTE — LETTER
September 17, 2018     Sybil [address]ac[id number]    Patient: Michelle May   YOB: 1945   Date of Visit: 9/17/2018       Dear Dr Nadir Rivera: Thank you for referring Judy Lima to me for evaluation  Below are my notes for this consultation  If you have questions, please do not hesitate to call me  I look forward to following your patient along with you           Sincerely,        Evelena Phalen, MD        CC: No Recipients

## 2018-09-17 NOTE — PROGRESS NOTES
[facility] - Outpatient Consultation  Starla Kennedy 68 y o  male MRN: [id number]  Encounter: [id number]          ASSESSMENT AND PLAN:      1  Screening for colon cancer  Patient was counseled on the benefits and risks of endoscopic intervention including but not limited to bleeding, infection, bowel perforation  Patient also understands colonoscopy is not 100% sensitive to detect polyps   ______________________________________________________________________    HPI:   80-year-old man presented for evaluation of colon cancer screening  Patient has never had a colonoscopy before  He denies family history of colon cancer  He reported regular formed bowel movement  Denies blood in the stool  Patient smokes pipe for many years  REVIEW OF SYSTEMS:    CONSTITUTIONAL: Denies any fever, chills, rigors, and weight loss  HEENT: No earache or tinnitus  Denies hearing loss or visual disturbances  CARDIOVASCULAR: No chest pain or palpitations  RESPIRATORY: Denies any cough, hemoptysis, shortness of breath or dyspnea on exertion  GASTROINTESTINAL: As noted in the History of Present Illness  GENITOURINARY: No problems with urination  Denies any hematuria or dysuria  NEUROLOGIC: No dizziness or vertigo, denies headaches  MUSCULOSKELETAL: Denies any muscle or joint pain  SKIN: Denies skin rashes or itching  ENDOCRINE: Denies excessive thirst  Denies intolerance to heat or cold  PSYCHOSOCIAL: Denies depression or anxiety  Denies any recent memory loss         Historical Information   Past Medical History:   Diagnosis Date    Benign hypertension with chronic kidney disease, stage III 8/22/2016    Degenerative arthritis of hip 3/2/2015    Impacted cerumen of both ears     last assessed-2/9/2015    Renal calculi     last assessed-9/22/2014    Urinary problem     last assessed-9/22/2014    Uveitis     last assessed-2/22/2016     Past Surgical History:   Procedure Laterality Date    TONSILLECTOMY      last assessed-9/22/2014     Social History   History   Alcohol Use No     History   Drug Use No     History   Smoking Status    Current Some Day Smoker   Smokeless Tobacco    Never Used     Family History   Problem Relation Age of Onset    Breast cancer Mother     Coronary artery disease Father        Meds/Allergies       Current Outpatient Prescriptions:     allopurinol (ZYLOPRIM) 100 mg tablet    furosemide (LASIX) 20 mg tablet    potassium chloride (MICRO-K) 10 MEQ CR capsule    tamsulosin (FLOMAX) 0 4 mg    traMADol-acetaminophen (ULTRACET) 37 5-325 mg per tablet    Na Sulfate-K Sulfate-Mg Sulf (SUPREP BOWEL PREP KIT) 17 5-3 13-1 6 GM/180ML SOLN    No Known Allergies        Objective     Blood pressure 112/70, pulse 90, temperature 97 9 °F (36 6 °C), temperature source Tympanic, height 5' 9" (1 753 m), weight 91 4 kg (201 lb 9 6 oz)  Body mass index is 29 77 kg/m²  PHYSICAL EXAM:      General Appearance:   Alert, cooperative, no distress   HEENT:   Normocephalic, atraumatic, anicteric      Neck:  Supple, symmetrical, trachea midline   Lungs:   Clear to auscultation bilaterally; no rales, rhonchi or wheezing; respirations unlabored    Heart[de-identified]   Regular rate and rhythm; no murmur, rub, or gallop  Abdomen:   Soft, non-tender, non-distended; normal bowel sounds; no masses, no organomegaly    Genitalia:   Deferred    Rectal:   Deferred    Extremities:  No cyanosis, clubbing or edema    Pulses:  2+ and symmetric    Skin:  No jaundice, rashes, or lesions    Lymph nodes:  No palpable cervical lymphadenopathy        Lab Results:   No visits with results within 1 Day(s) from this visit     Latest known visit with results is:   Orders Only on 04/30/2018   Component Date Value    Uric Acid 04/30/2018 6 9     Glucose 04/30/2018 96     BUN 04/30/2018 17     Creatinine 04/30/2018 1 47*    eGFR Non African American 04/30/2018 47*    SL AMB EGFR  AMER* 04/30/2018 55*    SL AMB BUN/CREATININE RA* 04/30/2018 12     Sodium 04/30/2018 141     SL AMB POTASSIUM 04/30/2018 4 9     Chloride 04/30/2018 107     CO2 04/30/2018 29     SL AMB CALCIUM 04/30/2018 9 3     SL AMB PROTEIN, TOTAL 04/30/2018 6 4     Albumin 04/30/2018 4 0     Globulin 04/30/2018 2 4     SL AMB ALBUMIN/GLOBULIN * 04/30/2018 1 7     TOTAL BILIRUBIN 04/30/2018 1 1     Alkaline Phosphatase 04/30/2018 56     SL AMB AST 04/30/2018 12     SL AMB ALT 04/30/2018 12     White Blood Cell Count 04/30/2018 6 2     Red Blood Cell Count 04/30/2018 4 76     Hemoglobin 04/30/2018 14 5     HCT 04/30/2018 42 4     MCV 04/30/2018 89 1     MCH 04/30/2018 30 5     MCHC 04/30/2018 34 2     RDW 04/30/2018 13 0     Platelet Count 21/81/1896 290     SL AMB MPV 04/30/2018 9 1          Radiology Results:   No results found

## 2018-09-18 NOTE — PATIENT INSTRUCTIONS
Colonoscopy scheduled 10/8/18 at [facility] with Dr Davidson Machado instructions given in office

## 2018-09-24 ENCOUNTER — ANESTHESIA EVENT (OUTPATIENT)
Dept: PERIOP | Facility: AMBULARY SURGERY CENTER | Age: 73
End: 2018-09-24
Payer: MEDICARE

## 2018-10-01 DIAGNOSIS — M16.9 OSTEOARTHRITIS OF HIP, UNSPECIFIED LATERALITY, UNSPECIFIED OSTEOARTHRITIS TYPE: ICD-10-CM

## 2018-10-01 NOTE — TELEPHONE ENCOUNTER
D/w [place] as noted pt not on PDMP  She did call pharm earlier to confirm fills and due for refill  No concerns, Dr Linda Borjas filled rx, but was printed in error  Will electronically send

## 2018-10-08 ENCOUNTER — ANESTHESIA (OUTPATIENT)
Dept: PERIOP | Facility: AMBULARY SURGERY CENTER | Age: 73
End: 2018-10-08
Payer: MEDICARE

## 2018-10-08 ENCOUNTER — HOSPITAL ENCOUNTER (OUTPATIENT)
Facility: AMBULARY SURGERY CENTER | Age: 73
Setting detail: OUTPATIENT SURGERY
Discharge: HOME | End: 2018-10-08
Attending: INTERNAL MEDICINE | Admitting: INTERNAL MEDICINE
Payer: MEDICARE

## 2018-10-08 VITALS
HEART RATE: 66 BPM | WEIGHT: 193 LBS | TEMPERATURE: 98.2 F | RESPIRATION RATE: 16 BRPM | DIASTOLIC BLOOD PRESSURE: 50 MMHG | BODY MASS INDEX: 29.25 KG/M2 | HEIGHT: 68 IN | SYSTOLIC BLOOD PRESSURE: 100 MMHG | OXYGEN SATURATION: 97 %

## 2018-10-08 DIAGNOSIS — Z12.11 SCREENING FOR COLON CANCER: ICD-10-CM

## 2018-10-08 PROCEDURE — 45380 COLONOSCOPY AND BIOPSY: CPT | Performed by: INTERNAL MEDICINE

## 2018-10-08 PROCEDURE — 88305 TISSUE EXAM BY PATHOLOGIST: CPT | Performed by: PATHOLOGY

## 2018-10-08 RX ORDER — LIDOCAINE HYDROCHLORIDE 10 MG/ML
INJECTION, SOLUTION INFILTRATION; PERINEURAL AS NEEDED
Status: DISCONTINUED | OUTPATIENT
Start: 2018-10-08 | End: 2018-10-08 | Stop reason: SURG

## 2018-10-08 RX ORDER — PROPOFOL 10 MG/ML
INJECTION, EMULSION INTRAVENOUS AS NEEDED
Status: DISCONTINUED | OUTPATIENT
Start: 2018-10-08 | End: 2018-10-08 | Stop reason: SURG

## 2018-10-08 RX ORDER — SODIUM CHLORIDE 9 MG/ML
125 INJECTION, SOLUTION INTRAVENOUS CONTINUOUS
Status: DISCONTINUED | OUTPATIENT
Start: 2018-10-08 | End: 2018-10-08 | Stop reason: HOSPADM

## 2018-10-08 RX ADMIN — SODIUM CHLORIDE: 0.9 INJECTION, SOLUTION INTRAVENOUS at 10:40

## 2018-10-08 RX ADMIN — LIDOCAINE HYDROCHLORIDE 50 MG: 10 INJECTION, SOLUTION INFILTRATION; PERINEURAL at 11:04

## 2018-10-08 RX ADMIN — SODIUM CHLORIDE 125 ML/HR: 0.9 INJECTION, SOLUTION INTRAVENOUS at 10:28

## 2018-10-08 RX ADMIN — PROPOFOL 50 MG: 10 INJECTION, EMULSION INTRAVENOUS at 11:13

## 2018-10-08 RX ADMIN — SODIUM CHLORIDE: 0.9 INJECTION, SOLUTION INTRAVENOUS at 11:15

## 2018-10-08 RX ADMIN — PROPOFOL 100 MG: 10 INJECTION, EMULSION INTRAVENOUS at 11:04

## 2018-10-08 RX ADMIN — PROPOFOL 100 MG: 10 INJECTION, EMULSION INTRAVENOUS at 11:08

## 2018-10-08 NOTE — OP NOTE
COLONOSCOPY    PROCEDURE: Colonoscopy/ Polypectomny (Cold Biopsy)    INDICATIONS: Screening for Colon Cancer    POST-OP DIAGNOSIS: See the impression below    SEDATION: Monitored anesthesia care, check anesthesia records    PHYSICAL EXAM:    BP (!) 173/77   Pulse 84   Temp (!) 97 4 °F (36 3 °C) (Temporal)   Resp 18   Ht 5' 8" (1 727 m)   Wt 87 5 kg (193 lb)   SpO2 97%   BMI 29 35 kg/m²   Body mass index is 29 35 kg/m²  General: NAD  Heart: S1 & S2 normal, RRR  Lungs: CTA, No rales or rhonchi  Abdomen: Soft, nontender, nondistended, good bowel sounds    CONSENT:  Informed consent was obtained for the procedure, including sedation after explaining the risks and benefits of the procedure  Risks including but not limited to bleeding, perforation, infection, aspiration were discussed in detail  Also explained about less than 100%$ sensitivity with the exam and other alternatives  PREPARATION:   EKG tracing, pulse oximetry, blood pressure were monitored throughout the procedure  Patient was identified by myself both verbally and by visual inspection of ID band  DESCRIPTION:   Patient was placed in the left lateral decubitus position and was sedated with the above medication  Digital rectal examination was performed  The colonoscope was introduced in to the anal canal and advanced up to cecum, which was identified by the appendiceal orifice and IC valve  A careful inspection was made as the colonoscope was withdrawn, including a retroflexed view of the rectum; findings and interventions are described below  Appropriate photodocumentation was obtained  The quality of the colonic preparation was good  Intubation time: 1 minute  Withdrawal time: 9 minutes  FINDINGS:    - 1 diminutive polyp was removed from the cecum  Polypectomy was done using cold forceps biopsies  - 1 diminutive sessile polyp was removed from the ascending colon  Polypectomy was done using cold forceps biopsies    - small hemorrhoids during retroflexed  IMPRESSIONS:      Small polyps removed  Small hemorrhoids    RECOMMENDATIONS:    - follow up biopsy results  Repeat colonoscopy based on biopsy results  COMPLICATIONS:  None; patient tolerated the procedure well      DISPOSITION: PACU           CONDITION: Stable

## 2018-10-08 NOTE — H&P
History and Physical -  [facility]  Paulo Goetz 68 y o  male MRN: [id number]                  HPI: Paulo Goetz is a 68y o  year old male who presents for colon cancer screening      REVIEW OF SYSTEMS: Per the HPI, and otherwise unremarkable  Historical Information   Past Medical History:   Diagnosis Date    Benign hypertension with chronic kidney disease, stage III ([facility] [address] ) 8/22/2016    Degenerative arthritis of hip 3/2/2015    Gout     Impacted cerumen of both ears     last assessed-2/9/2015    Renal calculi     last assessed-9/22/2014    Urinary problem     last assessed-9/22/2014    Uveitis     last assessed-2/22/2016     Past Surgical History:   Procedure Laterality Date    CATARACT EXTRACTION Right     TONSILLECTOMY      last assessed-9/22/2014     Social History   History   Alcohol Use No     History   Drug Use No     History   Smoking Status    Current Some Day Smoker   Smokeless Tobacco    Never Used     Comment: Smokes 2 /day     Family History   Problem Relation Age of Onset    Breast cancer Mother     Coronary artery disease Father        Meds/Allergies     Prescriptions Prior to Admission   Medication    allopurinol (ZYLOPRIM) 100 mg tablet    potassium chloride (MICRO-K) 10 MEQ CR capsule    traMADol-acetaminophen (ULTRACET) 37 5-325 mg per tablet    furosemide (LASIX) 20 mg tablet    tamsulosin (FLOMAX) 0 4 mg       No Known Allergies    Objective     Blood pressure (!) 173/77, pulse 84, temperature (!) 97 4 °F (36 3 °C), temperature source Temporal, resp  rate 18, height 5' 8" (1 727 m), weight 87 5 kg (193 lb), SpO2 97 %  PHYSICAL EXAM    Gen: NAD  CV: RRR  CHEST: Clear  ABD: soft, NT/ND  EXT: no edema      ASSESSMENT/PLAN:  This is a 68y o  year old male here for colon cancer screening, and he is stable and optimized for his procedure

## 2018-10-08 NOTE — ANESTHESIA PREPROCEDURE EVALUATION
Review of Systems/Medical History  Patient summary reviewed  Chart reviewed  No history of anesthetic complications     Cardiovascular  Hypertension ,    Pulmonary  Smoker ,        GI/Hepatic       Kidney stones, Chronic kidney disease stage 3,        Endo/Other     GYN       Hematology   Musculoskeletal    Arthritis     Neurology   Psychology           Physical Exam    Airway    Mallampati score: II  TM Distance: >3 FB  Neck ROM: full     Dental       Cardiovascular      Pulmonary      Other Findings        Anesthesia Plan  ASA Score- 2     Anesthesia Type- IV sedation with anesthesia with ASA Monitors  Additional Monitors:   Airway Plan:         Plan Factors-    Induction- intravenous  Postoperative Plan-     Informed Consent- Anesthetic plan and risks discussed with patient  I personally reviewed this patient with the CRNA  Discussed and agreed on the Anesthesia Plan with the CRNA  Mario Aguirre

## 2018-10-08 NOTE — ANESTHESIA POSTPROCEDURE EVALUATION
Post-Op Assessment Note      CV Status:  Stable    Mental Status:  Alert and awake    Hydration Status:  Euvolemic    PONV Controlled:  Controlled    Airway Patency:  Patent    Post Op Vitals Reviewed: Yes          Staff: Anesthesiologist, CRNA       Comments: VSS          BP     Temp     Pulse     Resp      SpO2

## 2018-10-22 DIAGNOSIS — I12.9 BENIGN HYPERTENSION WITH CHRONIC KIDNEY DISEASE: ICD-10-CM

## 2018-10-23 RX ORDER — POTASSIUM CHLORIDE 750 MG/1
10 CAPSULE, EXTENDED RELEASE ORAL DAILY
Qty: 30 CAPSULE | Refills: 3 | Status: SHIPPED | OUTPATIENT
Start: 2018-10-23 | End: 2019-02-26 | Stop reason: SDUPTHER

## 2018-10-23 NOTE — TELEPHONE ENCOUNTER
Pt doesn't need an appt until January  Per Dr Mandeep Leslie to be seen in [facility]

## 2018-11-19 DIAGNOSIS — M16.9 OSTEOARTHRITIS OF HIP, UNSPECIFIED LATERALITY, UNSPECIFIED OSTEOARTHRITIS TYPE: ICD-10-CM

## 2019-01-08 DIAGNOSIS — M16.9 OSTEOARTHRITIS OF HIP, UNSPECIFIED LATERALITY, UNSPECIFIED OSTEOARTHRITIS TYPE: ICD-10-CM

## 2019-01-08 DIAGNOSIS — M10.9 GOUT, ARTHROPATHY: ICD-10-CM

## 2019-01-08 RX ORDER — ALLOPURINOL 100 MG/1
100 TABLET ORAL DAILY
Qty: 90 TABLET | Refills: 1 | Status: SHIPPED | OUTPATIENT
Start: 2019-01-08 | End: 2019-07-15 | Stop reason: SDUPTHER

## 2019-01-14 ENCOUNTER — OFFICE VISIT (OUTPATIENT)
Dept: INTERNAL MEDICINE CLINIC | Facility: CLINIC | Age: 74
End: 2019-01-14
Payer: MEDICARE

## 2019-01-14 VITALS
OXYGEN SATURATION: 97 % | DIASTOLIC BLOOD PRESSURE: 78 MMHG | SYSTOLIC BLOOD PRESSURE: 128 MMHG | TEMPERATURE: 97.8 F | BODY MASS INDEX: 31.26 KG/M2 | WEIGHT: 205.6 LBS | HEART RATE: 84 BPM

## 2019-01-14 DIAGNOSIS — N18.30 BENIGN HYPERTENSION WITH CHRONIC KIDNEY DISEASE, STAGE III (HCC): ICD-10-CM

## 2019-01-14 DIAGNOSIS — R60.9 PERIPHERAL EDEMA: ICD-10-CM

## 2019-01-14 DIAGNOSIS — M16.12 PRIMARY OSTEOARTHRITIS OF LEFT HIP: Primary | ICD-10-CM

## 2019-01-14 DIAGNOSIS — M10.9 GOUTY ARTHROPATHY: ICD-10-CM

## 2019-01-14 DIAGNOSIS — I12.9 BENIGN HYPERTENSION WITH CHRONIC KIDNEY DISEASE, STAGE III (HCC): ICD-10-CM

## 2019-01-14 DIAGNOSIS — L20.89 OTHER ATOPIC DERMATITIS: ICD-10-CM

## 2019-01-14 PROCEDURE — 99214 OFFICE O/P EST MOD 30 MIN: CPT | Performed by: INTERNAL MEDICINE

## 2019-01-14 RX ORDER — MOMETASONE FUROATE 1 MG/G
CREAM TOPICAL DAILY
Qty: 45 G | Refills: 0 | Status: SHIPPED | OUTPATIENT
Start: 2019-01-14 | End: 2019-12-23 | Stop reason: ALTCHOICE

## 2019-01-14 NOTE — ASSESSMENT & PLAN NOTE
Persistent left hip pain  We discussed the use of intra-articular steroids as a temporizing therapy  The patient will defer for now  Ultra set is effective for temporary relief

## 2019-01-14 NOTE — ASSESSMENT & PLAN NOTE
The patient is using furosemide intermittently because of his work schedule  As a result he has some increased and persistent peripheral edema  He was given a prescription to update his labs

## 2019-01-14 NOTE — PROGRESS NOTES
Assessment/Plan:    Benign hypertension with chronic kidney disease, stage III  The patient is using furosemide intermittently because of his work schedule  As a result he has some increased and persistent peripheral edema  He was given a prescription to update his labs  Degenerative arthritis of hip  Persistent left hip pain  We discussed the use of intra-articular steroids as a temporizing therapy  The patient will defer for now  Ultra set is effective for temporary relief  Gouty arthropathy  Gout has not been a problem since the patient has been inconsistent with his diuretics  Peripheral edema  Patient has been taking his furosemide and potassium inconsistently because of his work schedule  Other atopic dermatitis  Will provide a prescription for mometasone, 0 1% to be applied daily to the affected area  Diagnoses and all orders for this visit:    Primary osteoarthritis of left hip  -     CBC and Platelet; Future  -     C-reactive protein; Future  -     Sedimentation rate, automated; Future  -     Uric acid; Future  -     UA (URINE) with reflex to Microscopic; Future  -     Comprehensive metabolic panel; Future  -     Magnesium; Future    Peripheral edema  -     CBC and Platelet; Future  -     C-reactive protein; Future  -     Sedimentation rate, automated; Future  -     Uric acid; Future  -     UA (URINE) with reflex to Microscopic; Future  -     Comprehensive metabolic panel; Future  -     Magnesium; Future    Benign hypertension with chronic kidney disease, stage III (HCC)  -     CBC and Platelet; Future  -     C-reactive protein; Future  -     Sedimentation rate, automated; Future  -     Uric acid; Future  -     UA (URINE) with reflex to Microscopic; Future  -     Comprehensive metabolic panel; Future  -     Magnesium; Future    Gouty arthropathy  -     CBC and Platelet; Future  -     C-reactive protein; Future  -     Sedimentation rate, automated; Future  -     Uric acid;  Future  - UA (URINE) with reflex to Microscopic; Future  -     Comprehensive metabolic panel; Future  -     Magnesium; Future    Other atopic dermatitis  -     mometasone (ELOCON) 0 1 % cream; Apply topically daily          Subjective:      Patient ID: Tia Santiago is a 68 y o  male  Patient presents to the office for follow-up visit for peripheral edema, hypertension, gouty arthropathy, left hip arthritis  He ran had a colonoscopy done last fall which revealed evidence of 2 polyps with adenomatous changes  A follow-up is recommended in 5 years  This was reviewed with the patient  He has been inconsistent in taking his diuretic  As a result he has had some increase in peripheral edema  It is not uncomfortable and does not seem to bother him to any great degree  He denies any dyspnea or orthopnea  He is complaining of some increased pain in his left hip  We reviewed possible treatments  Given the fact that he has peripheral edema and stage 3 kidney disease he should avoid the use of NSAIDs  He has been using ultrasound that with fairly good results and pain control  But he would like to consider intra-articular steroid injection if it would be beneficial   He was seen by [facility] in regard to his elevated PSA  Family History   Problem Relation Age of Onset    Breast cancer Mother     Coronary artery disease Father      Social History     Social History    Marital status: Significant Other     Spouse name: N/A    Number of children: N/A    Years of education: N/A     Occupational History    Not on file       Social History Main Topics    Smoking status: Current Some Day Smoker    Smokeless tobacco: Never Used      Comment: smokes a pipe some days    Alcohol use No    Drug use: No    Sexual activity: Not on file     Other Topics Concern    Not on file     Social History Narrative    No narrative on file     Past Medical History:   Diagnosis Date    Benign hypertension with chronic kidney disease, stage III ([facility] [address] ) 8/22/2016    Degenerative arthritis of hip 3/2/2015    Gout     Impacted cerumen of both ears     last assessed-2/9/2015    Renal calculi     last assessed-9/22/2014    Urinary problem     last assessed-9/22/2014    Uveitis     last assessed-2/22/2016       Current Outpatient Prescriptions:     allopurinol (ZYLOPRIM) 100 mg tablet, Take 1 tablet (100 mg total) by mouth daily, Disp: 90 tablet, Rfl: 1    furosemide (LASIX) 20 mg tablet, Take 20 mg by mouth daily, Disp: , Rfl:     potassium chloride (MICRO-K) 10 MEQ CR capsule, Take 1 capsule (10 mEq total) by mouth daily, Disp: 30 capsule, Rfl: 3    tamsulosin (FLOMAX) 0 4 mg, Take 0 4 mg by mouth daily at bedtime  , Disp: , Rfl:     traMADol-acetaminophen (ULTRACET) 37 5-325 mg per tablet, Take 2 tablets by mouth every 6 (six) hours as needed for moderate pain, Disp: 100 tablet, Rfl: 0    mometasone (ELOCON) 0 1 % cream, Apply topically daily, Disp: 45 g, Rfl: 0  No Known Allergies  Past Surgical History:   Procedure Laterality Date    CATARACT EXTRACTION Right     TX COLONOSCOPY FLX DX W/COLLJ SPEC WHEN PFRMD N/A 10/8/2018    Procedure: COLONOSCOPY;  Surgeon: Harjeet Atwood MD;  [facility]; Service: Gastroenterology    TONSILLECTOMY      last assessed-9/22/2014         Review of Systems   Constitutional: Negative  HENT: Negative  Eyes: Negative  Respiratory: Negative  Cardiovascular: Positive for leg swelling (Bilateral pretibial edema)  Gastrointestinal: Negative  Genitourinary: Negative  Musculoskeletal: Positive for arthralgias (Chronic left hip pain)  Skin: Positive for rash (Rash on the anteromedial aspect of the right lower extremity)  Neurological: Negative  Hematological: Negative  Psychiatric/Behavioral: Negative            Objective:      /78 (BP Location: Left arm, Patient Position: Sitting, Cuff Size: Standard)   Pulse 84   Temp 97 8 °F (36 6 °C) (Oral)   Wt 93 3 kg (205 lb 9 6 oz) Comment: with shoes  SpO2 97%   BMI 31 26 kg/m²          Physical Exam   Constitutional: He is oriented to person, place, and time  He appears well-developed and well-nourished  No distress  HENT:   Head: Normocephalic and atraumatic  Mouth/Throat: Oropharynx is clear and moist    Eyes: Conjunctivae are normal  No scleral icterus  Neck: Neck supple  No JVD present  Carotid bruit is not present  No tracheal deviation present  No thyromegaly present  Cardiovascular: Normal rate, regular rhythm, normal heart sounds and intact distal pulses  Pulmonary/Chest: Effort normal and breath sounds normal  No respiratory distress  He has no wheezes  He has no rales  Abdominal: He exhibits no distension  Musculoskeletal: He exhibits no edema (2+ pretibial edema bilaterally)  Lymphadenopathy:     He has no cervical adenopathy  Neurological: He is alert and oriented to person, place, and time  No cranial nerve deficit  No focal motor deficits appreciated  Skin: Rash (Eczematous rash noted on the mid anteromedial aspect of the right lower extremity) noted  Psychiatric: He has a normal mood and affect  Thought content normal    Vitals reviewed

## 2019-02-26 DIAGNOSIS — I12.9 BENIGN HYPERTENSION WITH CHRONIC KIDNEY DISEASE: ICD-10-CM

## 2019-02-27 RX ORDER — POTASSIUM CHLORIDE 750 MG/1
10 CAPSULE, EXTENDED RELEASE ORAL DAILY
Qty: 30 CAPSULE | Refills: 4 | Status: SHIPPED | OUTPATIENT
Start: 2019-02-27 | End: 2019-05-21 | Stop reason: SDUPTHER

## 2019-03-11 DIAGNOSIS — M16.9 OSTEOARTHRITIS OF HIP, UNSPECIFIED LATERALITY, UNSPECIFIED OSTEOARTHRITIS TYPE: ICD-10-CM

## 2019-04-15 DIAGNOSIS — R60.9 PERIPHERAL EDEMA: Primary | ICD-10-CM

## 2019-04-15 RX ORDER — FUROSEMIDE 20 MG/1
20 TABLET ORAL DAILY
Qty: 30 TABLET | Refills: 2 | Status: SHIPPED | OUTPATIENT
Start: 2019-04-15 | End: 2019-06-24 | Stop reason: SDUPTHER

## 2019-05-20 LAB
ALBUMIN SERPL-MCNC: 4.2 G/DL (ref 3.6–5.1)
ALBUMIN/GLOB SERPL: 1.7 (CALC) (ref 1–2.5)
ALP SERPL-CCNC: 56 U/L (ref 40–115)
ALT SERPL-CCNC: 15 U/L (ref 9–46)
AST SERPL-CCNC: 13 U/L (ref 10–35)
BILIRUB SERPL-MCNC: 1.1 MG/DL (ref 0.2–1.2)
BUN SERPL-MCNC: 29 MG/DL (ref 7–25)
BUN/CREAT SERPL: 16 (CALC) (ref 6–22)
CALCIUM SERPL-MCNC: 9.2 MG/DL (ref 8.6–10.3)
CHLORIDE SERPL-SCNC: 107 MMOL/L (ref 98–110)
CO2 SERPL-SCNC: 27 MMOL/L (ref 20–32)
CREAT SERPL-MCNC: 1.85 MG/DL (ref 0.7–1.18)
GLOBULIN SER CALC-MCNC: 2.5 G/DL (CALC) (ref 1.9–3.7)
GLUCOSE SERPL-MCNC: 110 MG/DL (ref 65–99)
MAGNESIUM SERPL-MCNC: 2.1 MG/DL (ref 1.5–2.5)
POTASSIUM SERPL-SCNC: 4.7 MMOL/L (ref 3.5–5.3)
PROT SERPL-MCNC: 6.7 G/DL (ref 6.1–8.1)
SL AMB EGFR AFRICAN AMERICAN: 41 ML/MIN/1.73M2
SL AMB EGFR NON AFRICAN AMERICAN: 36 ML/MIN/1.73M2
SODIUM SERPL-SCNC: 141 MMOL/L (ref 135–146)
URATE SERPL-MCNC: 8.7 MG/DL (ref 4–8)

## 2019-05-21 DIAGNOSIS — I12.9 BENIGN HYPERTENSION WITH CHRONIC KIDNEY DISEASE: ICD-10-CM

## 2019-05-21 RX ORDER — POTASSIUM CHLORIDE 750 MG/1
TABLET, EXTENDED RELEASE ORAL
Qty: 30 TABLET | Refills: 3 | Status: SHIPPED | OUTPATIENT
Start: 2019-05-21 | End: 2019-08-20 | Stop reason: SDUPTHER

## 2019-05-28 DIAGNOSIS — M16.9 OSTEOARTHRITIS OF HIP, UNSPECIFIED LATERALITY, UNSPECIFIED OSTEOARTHRITIS TYPE: ICD-10-CM

## 2019-05-28 DIAGNOSIS — I12.9 BENIGN HYPERTENSION WITH CHRONIC KIDNEY DISEASE: Primary | ICD-10-CM

## 2019-05-30 RX ORDER — LISINOPRIL 5 MG/1
5 TABLET ORAL DAILY
Qty: 90 TABLET | Refills: 0 | Status: SHIPPED | OUTPATIENT
Start: 2019-05-30 | End: 2019-07-15

## 2019-06-24 DIAGNOSIS — R60.9 PERIPHERAL EDEMA: ICD-10-CM

## 2019-06-24 RX ORDER — FUROSEMIDE 20 MG/1
20 TABLET ORAL DAILY
Qty: 30 TABLET | Refills: 5 | Status: SHIPPED | OUTPATIENT
Start: 2019-06-24 | End: 2019-07-15 | Stop reason: CLARIF

## 2019-07-04 DIAGNOSIS — M10.9 GOUT, ARTHROPATHY: ICD-10-CM

## 2019-07-05 RX ORDER — ALLOPURINOL 100 MG/1
TABLET ORAL
Qty: 90 TABLET | Refills: 1 | OUTPATIENT
Start: 2019-07-05

## 2019-07-15 ENCOUNTER — OFFICE VISIT (OUTPATIENT)
Dept: INTERNAL MEDICINE CLINIC | Facility: CLINIC | Age: 74
End: 2019-07-15
Payer: MEDICARE

## 2019-07-15 VITALS
OXYGEN SATURATION: 98 % | SYSTOLIC BLOOD PRESSURE: 130 MMHG | BODY MASS INDEX: 32.55 KG/M2 | TEMPERATURE: 98.7 F | WEIGHT: 207.4 LBS | HEART RATE: 89 BPM | DIASTOLIC BLOOD PRESSURE: 72 MMHG | HEIGHT: 67 IN

## 2019-07-15 DIAGNOSIS — M10.9 GOUT, ARTHROPATHY: ICD-10-CM

## 2019-07-15 DIAGNOSIS — Z11.59 NEED FOR HEPATITIS C SCREENING TEST: Primary | ICD-10-CM

## 2019-07-15 DIAGNOSIS — Z23 NEED FOR DIPHTHERIA-TETANUS-PERTUSSIS (TDAP) VACCINE: ICD-10-CM

## 2019-07-15 DIAGNOSIS — R60.9 PERIPHERAL EDEMA: ICD-10-CM

## 2019-07-15 DIAGNOSIS — M16.12 PRIMARY OSTEOARTHRITIS OF LEFT HIP: ICD-10-CM

## 2019-07-15 DIAGNOSIS — I12.9 BENIGN HYPERTENSION WITH CHRONIC KIDNEY DISEASE, STAGE III (HCC): ICD-10-CM

## 2019-07-15 DIAGNOSIS — Z00.00 MEDICARE ANNUAL WELLNESS VISIT, SUBSEQUENT: ICD-10-CM

## 2019-07-15 DIAGNOSIS — I12.9 BENIGN HYPERTENSION WITH CHRONIC KIDNEY DISEASE: ICD-10-CM

## 2019-07-15 DIAGNOSIS — N18.30 BENIGN HYPERTENSION WITH CHRONIC KIDNEY DISEASE, STAGE III (HCC): ICD-10-CM

## 2019-07-15 DIAGNOSIS — M1A.3710 CHRONIC GOUT DUE TO RENAL IMPAIRMENT INVOLVING TOE OF RIGHT FOOT WITHOUT TOPHUS: ICD-10-CM

## 2019-07-15 DIAGNOSIS — Z23 NEED FOR PNEUMOCOCCAL VACCINE: ICD-10-CM

## 2019-07-15 PROCEDURE — 99214 OFFICE O/P EST MOD 30 MIN: CPT | Performed by: INTERNAL MEDICINE

## 2019-07-15 PROCEDURE — G0439 PPPS, SUBSEQ VISIT: HCPCS | Performed by: INTERNAL MEDICINE

## 2019-07-15 PROCEDURE — 90670 PCV13 VACCINE IM: CPT | Performed by: INTERNAL MEDICINE

## 2019-07-15 PROCEDURE — G0009 ADMIN PNEUMOCOCCAL VACCINE: HCPCS | Performed by: INTERNAL MEDICINE

## 2019-07-15 RX ORDER — ALLOPURINOL 100 MG/1
100 TABLET ORAL DAILY
Qty: 90 TABLET | Refills: 1 | Status: SHIPPED | OUTPATIENT
Start: 2019-07-15 | End: 2019-12-23 | Stop reason: SDUPTHER

## 2019-07-15 RX ORDER — TORSEMIDE 20 MG/1
40 TABLET ORAL DAILY
Qty: 60 TABLET | Refills: 5 | Status: SHIPPED | OUTPATIENT
Start: 2019-07-15 | End: 2021-04-02 | Stop reason: ALTCHOICE

## 2019-07-15 RX ORDER — LISINOPRIL 5 MG/1
5 TABLET ORAL EVERY OTHER DAY
Qty: 90 TABLET | Refills: 0
Start: 2019-07-15 | End: 2019-09-17 | Stop reason: SDUPTHER

## 2019-07-15 NOTE — PATIENT INSTRUCTIONS
Obesity   AMBULATORY CARE:   Obesity  is when your body mass index (BMI) is greater than 30  Your healthcare provider will use your height and weight to measure your BMI  The risks of obesity include  many health problems, such as injuries or physical disability  You may need tests to check for the following:  · Diabetes     · High blood pressure or high cholesterol     · Heart disease     · Gallbladder or liver disease     · Cancer of the colon, breast, prostate, liver, or kidney     · Sleep apnea     · Arthritis or gout  Seek care immediately if:   · You have a severe headache, confusion, or difficulty speaking  · You have weakness on one side of your body  · You have chest pain, sweating, or shortness of breath  Contact your healthcare provider if:   · You have symptoms of gallbladder or liver disease, such as pain in your upper abdomen  · You have knee or hip pain and discomfort while walking  · You have symptoms of diabetes, such as intense hunger and thirst, and frequent urination  · You have symptoms of sleep apnea, such as snoring or daytime sleepiness  · You have questions or concerns about your condition or care  Treatment for obesity  focuses on helping you lose weight to improve your health  Even a small decrease in BMI can reduce the risk for many health problems  Your healthcare provider will help you set a weight-loss goal   · Lifestyle changes  are the first step in treating obesity  These include making healthy food choices and getting regular physical activity  Your healthcare provider may suggest a weight-loss program that involves coaching, education, and therapy  · Medicine  may help you lose weight when it is used with a healthy diet and physical activity  · Surgery  can help you lose weight if you are very obese and have other health problems  There are several types of weight-loss surgery  Ask your healthcare provider for more information    Be successful losing weight:   · Set small, realistic goals  An example of a small goal is to walk for 20 minutes 5 days a week  Anther goal is to lose 5% of your body weight  · Tell friends, family members, and coworkers about your goals  and ask for their support  Ask a friend to lose weight with you, or join a weight-loss support group  · Identify foods or triggers that may cause you to overeat , and find ways to avoid them  Remove tempting high-calorie foods from your home and workplace  Place a bowl of fresh fruit on your kitchen counter  If stress causes you to eat, then find other ways to cope with stress  · Keep a diary to track what you eat and drink  Also write down how many minutes of physical activity you do each day  Weigh yourself once a week and record it in your diary  Eating changes: You will need to eat 500 to 1,000 fewer calories each day than you currently eat to lose 1 to 2 pounds a week  The following changes will help you cut calories:  · Eat smaller portions  Use small plates, no larger than 9 inches in diameter  Fill your plate half full of fruits and vegetables  Measure your food using measuring cups until you know what a serving size looks like  · Eat 3 meals and 1 or 2 snacks each day  Plan your meals in advance  [place] and eat at home most of the time  Eat slowly  · Eat fruits and vegetables at every meal   They are low in calories and high in fiber, which makes you feel full  Do not add butter, margarine, or cream sauce to vegetables  Use herbs to season steamed vegetables  · Eat less fat and fewer fried foods  Eat more baked or grilled chicken and fish  These protein sources are lower in calories and fat than red meat  Limit fast food  Dress your salads with olive oil and vinegar instead of bottled dressing  · Limit the amount of sugar you eat  Do not drink sugary beverages  Limit alcohol  Activity changes:  Physical activity is good for your body in many ways   It helps you burn calories and build strong muscles  It decreases stress and depression, and improves your mood  It can also help you sleep better  Talk to your healthcare provider before you begin an exercise program   · Exercise for at least 30 minutes 5 days a week  Start slowly  Set aside time each day for physical activity that you enjoy and that is convenient for you  It is best to do both weight training and an activity that increases your heart rate, such as walking, bicycling, or swimming  · Find ways to be more active  Do yard work and housecleaning  Walk up the stairs instead of using elevators  Spend your leisure time going to events that require walking, such as outdoor festivals or fairs  This extra physical activity can help you lose weight and keep it off  Follow up with your healthcare provider as directed: You may need to meet with a dietitian  Write down your questions so you remember to ask them during your visits  © 2017 [address] [employer] Information is for End User's use only and may not be sold, redistributed or otherwise used for commercial purposes  All illustrations and images included in CareNotes® are the copyrighted property of [employer]  or Noam Burch  The above information is an  only  It is not intended as medical advice for individual conditions or treatments  Talk to your doctor, nurse or pharmacist before following any medical regimen to see if it is safe and effective for you  Urinary Incontinence   WHAT YOU NEED TO KNOW:   What is urinary incontinence? Urinary incontinence (UI) is when you lose control of your bladder  What causes UI? UI occurs because your bladder cannot store or empty urine properly  The following are the most common types of UI:  · Stress incontinence  is when you leak urine due to increased bladder pressure  This may happen when you cough, sneeze, or exercise       · Urge incontinence  is when you feel the need to urinate right away and leak urine accidentally  · Mixed incontinence  is when you have both stress and urge UI  What are the signs and symptoms of UI?   · You feel like your bladder does not empty completely when you urinate  · You urinate often and need to urinate immediately  · You leak urine when you sleep, or you wake up with the urge to urinate  · You leak urine when you cough, sneeze, exercise, or laugh  How is UI diagnosed? Your healthcare provider will ask how often you leak urine and whether you have stress or urge symptoms  Tell him which medicines you take, how often you urinate, and how much liquid you drink each day  You may need any of the following tests:  · Urine tests  may show infection or kidney function  · A pelvic exam  may be done to check for blockages  A pelvic exam will also show if your bladder, uterus, or other organs have moved out of place  · An x-ray, ultrasound, or CT  may show problems with parts of your urinary system  You may be given contrast liquid to help your organs show up better in the pictures  Tell the healthcare provider if you have ever had an allergic reaction to contrast liquid  Do not enter the MRI room with anything metal  Metal can cause serious injury  Tell the healthcare provider if you have any metal in or on your body  · A bladder scan  will show how much urine is left in your bladder after you urinate  You will be asked to urinate and then healthcare providers will use a small ultrasound machine to check the urine left in your bladder  · Cystometry  is used to check the function of your urinary system  Your healthcare provider checks the pressure in your bladder while filling it with fluid  Your bladder pressure may also be tested when your bladder is full and while you urinate  How is UI treated? · Medicines  can help strengthen your bladder control      · Electrical stimulation  is used to send a small amount of electrical energy to your pelvic floor muscles  This helps control your bladder function  Electrodes may be placed outside your body or in your rectum  For women, the electrodes may be placed in the vagina  · A bulking agent  may be injected into the wall of your urethra to make it thicker  This helps keep your urethra closed and decreases urine leakage  · Devices  such as a clamp, pessary, or tampon may help stop urine leaks  Ask your healthcare provider for more information about these and other devices  · Surgery  may be needed if other treatments do not work  Several types of surgery can help improve your bladder control  Ask your healthcare provider for more information about the surgery you may need  How can I manage my symptoms? · Do pelvic muscle exercises often  Your pelvic muscles help you stop urinating  Squeeze these muscles tight for 5 seconds, then relax for 5 seconds  Gradually work up to squeezing for 10 seconds  Do 3 sets of 15 repetitions a day, or as directed  This will help strengthen your pelvic muscles and improve bladder control  · A catheter  may be used to help empty your bladder  A catheter is a tiny, plastic tube that is put into your bladder to drain your urine  Your healthcare provider may tell you to use a catheter to prevent your bladder from getting too full and leaking urine  · Keep a UI record  Write down how often you leak urine and how much you leak  Make a note of what you were doing when you leaked urine  · Train your bladder  Go to the bathroom at set times, such as every 2 hours, even if you do not feel the urge to go  You can also try to hold your urine when you feel the urge to go  For example, hold your urine for 5 minutes when you feel the urge to go  As that becomes easier, hold your urine for 10 minutes  · Drink liquids as directed  Ask your healthcare provider how much liquid to drink each day and which liquids are best for you   You may need to limit the amount of liquid you drink to help control your urine leakage  Limit or do not have drinks that contain caffeine or alcohol  Do not drink any liquid right before you go to bed  · Prevent constipation  Eat a variety of high-fiber foods  Good examples are high-fiber cereals, beans, vegetables, and whole-grain breads  Prune juice may help make your bowel movement softer  Walking is the best way to trigger your intestines to have a bowel movement  · Exercise regularly and maintain a healthy weight  Ask your healthcare provider how much you should weigh and about the best exercise plan for you  Weight loss and exercise will decrease pressure on your bladder and help you control your leakage  Ask him to help you create a weight loss plan if you are overweight  When should I seek immediate care? · You have severe pain  · You are confused or cannot think clearly  When should I contact my healthcare provider? · You have a fever  · You see blood in your urine  · You have pain when you urinate  · You have new or worse pain, even after treatment  · Your mouth feels dry or you have vision changes  · Your urine is cloudy or smells bad  · You have questions or concerns about your condition or care  CARE AGREEMENT:   You have the right to help plan your care  Learn about your health condition and how it may be treated  Discuss treatment options with your caregivers to decide what care you want to receive  You always have the right to refuse treatment  The above information is an  only  It is not intended as medical advice for individual conditions or treatments  Talk to your doctor, nurse or pharmacist before following any medical regimen to see if it is safe and effective for you  © 2017 [facility] Information is for End User's use only and may not be sold, redistributed or otherwise used for commercial purposes   All illustrations and images included in CareNotes® are the copyrighted property of [employer] M [employer] Inc  or Noam Burch  Cigarette Smoking and Your Health   AMBULATORY CARE:   Risks to your health if you smoke:  Nicotine and other chemicals found in tobacco damage every cell in your body  Even if you are a light smoker, you have an increased risk for cancer, heart disease, and lung disease  If you are pregnant or have diabetes, smoking increases your risk for complications  Benefits to your health if you stop smoking:   · You decrease respiratory symptoms such as coughing, wheezing, and shortness of breath  · You reduce your risk for cancers of the lung, mouth, throat, kidney, bladder, pancreas, stomach, and cervix  If you already have cancer, you increase the benefits of chemotherapy  You also reduce your risk for cancer returning or a second cancer from developing  · You reduce your risk for heart disease, blood clots, heart attack, and stroke  · You reduce your risk for lung infections, and diseases such as pneumonia, asthma, chronic bronchitis, and emphysema  · Your circulation improves  More oxygen can be delivered to your body  If you have diabetes, you lower your risk for complications, such as kidney, artery, and eye diseases  You also lower your risk for nerve damage  Nerve damage can lead to amputations, poor vision, and blindness  · You improve your body's ability to heal and to fight infections  Benefits to the health of others if you stop smoking:  Tobacco is harmful to nonsmokers who breathe in your secondhand smoke  The following are ways the health of others around you may improve when you stop smoking:  · You lower the risks for lung cancer and heart disease in nonsmoking adults  · If you are pregnant, you lower the risk for miscarriage, early delivery, low birth weight, and stillbirth  You also lower your baby's risk for SIDS, obesity, developmental delay, and neurobehavioral problems, such as ADHD  · If you have children, you lower their risk for ear infections, colds, pneumonia, bronchitis, and asthma  For more information and support to stop smoking:   · Smokefree  gov  Phone: 8- 897 - 244-5285  Web Address: www smokefr[employer]  Follow up with your healthcare provider as directed:  Write down your questions so you remember to ask them during your visits  © 2017 [address] [employer] Information is for End User's use only and may not be sold, redistributed or otherwise used for commercial purposes  All illustrations and images included in CareNotes® are the copyrighted property of [employer]  or Noam Burch  The above information is an  only  It is not intended as medical advice for individual conditions or treatments  Talk to your doctor, nurse or pharmacist before following any medical regimen to see if it is safe and effective for you  Fall Prevention   WHAT YOU NEED TO KNOW:   What is fall prevention? Fall prevention includes ways to make your home and other areas safer  It also includes ways you can move more carefully to prevent a fall  What increases my risk for falls? · Lack of vitamin D    · Not getting enough sleep each night    · Trouble walking or keeping your balance, or foot problems    · Health conditions that cause changes in your blood pressure, vision, or muscle strength and coordination    · Medicines that make you dizzy, weak, or sleepy    · Problems seeing clearly    · Shoes that have high heels or are not supportive    · Tripping hazards, such as items left on the floor, no handrails on the stairs, or broken steps  How can I help protect myself from falls? · Stand or sit up slowly  This may help you keep your balance and prevent falls  If you need to get up during the night, sit up first  Be sure you are fully awake before you stand  Turn on the light before you start walking  Go slowly in case you are still sleepy   Make sure you will not trip over any pets sleeping in the bedroom  · Use assistive devices as directed  Your healthcare provider may suggest that you use a cane or walker to help you keep your balance  You may need to have grab bars put in your bathroom near the toilet or in the shower  · Wear shoes that fit well and have soles that   Wear shoes both inside and outside  Use slippers with good   Do not wear shoes with high heels  · Wear a personal alarm  This is a device that allows you to call 911 if you fall and need help  Ask your healthcare provider for more information  · Stay active  Exercise can help strengthen your muscles and improve your balance  Your healthcare provider may recommend water aerobics or walking  He or she may also recommend physical therapy to improve your coordination  Never start an exercise program without talking to your healthcare provider first      · Manage medical conditions  Keep all appointments with your healthcare providers  Visit your eye doctor as directed  How can I make my home safer? · Add items to prevent falls in the bathroom  Put nonslip strips on your bath or shower floor to prevent you from slipping  Use a bath mat if you do not have carpet in the bathroom  This will prevent you from falling when you step out of the bath or shower  Use a shower seat so you do not need to stand while you shower  Sit on the toilet or a chair in your bathroom to dry yourself and put on clothing  This will prevent you from losing your balance from drying or dressing yourself while you are standing  · Keep paths clear  Remove books, shoes, and other objects from walkways and stairs  Place cords for telephones and lamps out of the way so that you do not need to walk over them  Tape them down if you cannot move them  Remove small rugs  If you cannot remove a rug, secure it with double-sided tape  This will prevent you from tripping  · Install bright lights in your home  Use night lights to help light paths to the bathroom or kitchen  Always turn on the light before you start walking  · Keep items you use often on shelves within reach  Do not use a step stool to help you reach an item  · Paint or place reflective tape on the edges of your stairs  This will help you see the stairs better  Call 911 or have someone else call if:   · You have fallen and are unconscious  · You have fallen and cannot move part of your body  Contact your healthcare provider if:   · You have fallen and have pain or a headache  · You have questions or concerns about your condition or care  CARE AGREEMENT:   You have the right to help plan your care  Learn about your health condition and how it may be treated  Discuss treatment options with your caregivers to decide what care you want to receive  You always have the right to refuse treatment  The above information is an  only  It is not intended as medical advice for individual conditions or treatments  Talk to your doctor, nurse or pharmacist before following any medical regimen to see if it is safe and effective for you  © 2017 [address] [facility] Information is for End User's use only and may not be sold, redistributed or otherwise used for commercial purposes  All illustrations and images included in CareNotes® are the copyrighted property of [employer]  or Noam Burch  Advance Directives   WHAT YOU NEED TO KNOW:   What are advance directives? Advance directives are legal documents that state your wishes and plans for medical care  These plans are made ahead of time in case you lose your ability to make decisions for yourself  Advance directives can apply to any medical decision, such as the treatments you want, and if you want to donate organs  What are the types of advance directives? There are many types of advance directives, and each state has rules about how to use them   You may choose a combination of any of the following:  · Living will: This is a written record of the treatment you want  You can also choose which treatments you do not want, which to limit, and which to stop at a certain time  This includes surgery, medicine, IV fluid, and tube feedings  · Durable power of  for healthcare [place] [facility]): This is a written record that states who you want to make healthcare choices for you when you are unable to make them for yourself  This person, called a proxy, is usually a family member or a friend  You may choose more than 1 proxy  · Do not resuscitate (DNR) order:  A DNR order is used in case your heart stops beating or you stop breathing  It is a request not to have certain forms of treatment, such as CPR  A DNR order may be included in other types of advance directives  · Medical directive: This covers the care that you want if you are in a coma, near death, or unable to make decisions for yourself  You can list the treatments you want for each condition  Treatment may include pain medicine, surgery, blood transfusions, dialysis, IV or tube feedings, and a ventilator (breathing machine)  · Values history: This document has questions about your views, beliefs, and how you feel and think about life  This information can help others choose the care that you would choose  Why are advance directives important? An advance directive helps you control your care  Although spoken wishes may be used, it is better to have your wishes written down  Spoken wishes can be misunderstood, or not followed  Treatments may be given even if you do not want them  An advance directive may make it easier for your family to make difficult choices about your care  How do I decide what to put in my advance directives? · Make informed decisions:  Make sure you fully understand treatments or care you may receive   Think about the benefits and problems your decisions could cause for you or your family  Talk to healthcare providers if you have concerns or questions before you write down your wishes  You may also want to talk with your Protestant or , or a   Check your state laws to make sure that what you put in your advance directive is legal      · Sign all forms:  Sign and date your advance directive when you have finished  You may also need 2 witnesses to sign the forms  Witnesses cannot be your doctor or his staff, your spouse, heirs or beneficiaries, people you owe money to, or your chosen proxy  Talk to your family, proxy, and healthcare providers about your advance directive  Give each person a copy, and keep one for yourself in a place you can get to easily  Do not keep it hidden or locked away  · Review and revise your plans: You can revise your advance directive at any time, as long as you are able to make decisions  Review your plan every year, and when there are changes in your life, or your health  When you make changes, let your family, proxy, and healthcare providers know  Give each a new copy  Where can I find more information? · [employer]  [address] , [address]  Phone: [phone]  Phone: 0- [phone]  Web Address: http://www  aafp org  · [address] [facility])  [address] [facility], [address] [facility] , [address] [place] [address]  Phone: [phone]  Phone: [phone]  Web Address: Mariana nelson  CARE AGREEMENT:   You have the right to help plan your care  To help with this plan, you must learn about your health condition and treatment options  You must also learn about advance directives and how they are used  Work with your healthcare providers to decide what care will be used to treat you  You always have the right to refuse treatment  The above information is an  only   It is not intended as medical advice for individual conditions or treatments  Talk to your doctor, nurse or pharmacist before following any medical regimen to see if it is safe and effective for you  © 2017 [address] [employer] Information is for End User's use only and may not be sold, redistributed or otherwise used for commercial purposes  All illustrations and images included in CareNotes® are the copyrighted property of [employer] M [employer]  or Noam Burch

## 2019-07-15 NOTE — PROGRESS NOTES
Assessment and Plan:     Problem List Items Addressed This Visit     None      Visit Diagnoses     Need for hepatitis C screening test    -  Primary    Need for diphtheria-tetanus-pertussis (Tdap) vaccine        Relevant Orders    TDAP VACCINE GREATER THAN OR EQUAL TO 6YO IM    Need for pneumococcal vaccine        Gout, arthropathy             History of Present Illness:     Patient presents for Medicare Annual Wellness visit    Patient Care Team:  Neftali Thomas MD as PCP - General  Royston Hurl, MD Marylee Purdue, MD Brynn Sanfilippo, MD as Endoscopist     Problem List:     Patient Active Problem List   Diagnosis    Benign hypertension with chronic kidney disease, stage III ([address] )    Degenerative arthritis of hip    Elevated PSA    Gouty arthropathy    Nocturia    Osteoarthritis    Peripheral edema    Screening for colon cancer    Other atopic dermatitis      Past Medical and Surgical History:     Past Medical History:   Diagnosis Date    Benign hypertension with chronic kidney disease, stage III ([address] ) 8/22/2016    Degenerative arthritis of hip 3/2/2015    Gout     Impacted cerumen of both ears     last assessed-2/9/2015    Renal calculi     last assessed-9/22/2014    Urinary problem     last assessed-9/22/2014    Uveitis     last assessed-2/22/2016     Past Surgical History:   Procedure Laterality Date    CATARACT EXTRACTION Right     AL COLONOSCOPY FLX DX W/COLLJ SPEC WHEN PFRMD N/A 10/8/2018    Procedure: COLONOSCOPY;  Surgeon: Natividad Moore MD;  [facility];   Service: Gastroenterology    TONSILLECTOMY      last assessed-9/22/2014      Family History:     Family History   Problem Relation Age of Onset    Breast cancer Mother     Coronary artery disease Father       Social History:     Social History     Tobacco Use   Smoking Status Current Some Day Smoker   Smokeless Tobacco Never Used   Tobacco Comment    smokes a pipe 2 times daily     Social History     Substance and Sexual Activity Alcohol Use No     Social History     Substance and Sexual Activity   Drug Use No      Medications and Allergies:     Current Outpatient Medications   Medication Sig Dispense Refill    allopurinol (ZYLOPRIM) 100 mg tablet Take 1 tablet (100 mg total) by mouth daily 90 tablet 1    furosemide (LASIX) 20 mg tablet Take 1 tablet (20 mg total) by mouth daily 30 tablet 5    KLOR-CON M10 10 MEQ tablet TAKE 1 TABLET BY MOUTH EVERY DAY 30 tablet 3    lisinopril (ZESTRIL) 5 mg tablet Take 1 tablet (5 mg total) by mouth daily 90 tablet 0    traMADol-acetaminophen (ULTRACET) 37 5-325 mg per tablet Take 2 tablets by mouth every 6 (six) hours as needed for moderate pain 100 tablet 0    mometasone (ELOCON) 0 1 % cream Apply topically daily (Patient not taking: Reported on 7/15/2019) 45 g 0    tamsulosin (FLOMAX) 0 4 mg Take 0 4 mg by mouth daily at bedtime  No current facility-administered medications for this visit  No Known Allergies   Immunizations:     Immunization History   Administered Date(s) Administered    INFLUENZA 10/01/2013, 09/16/2014, 10/07/2015, 09/28/2016, 09/20/2017, 09/22/2018    Influenza Split High Dose Preservative Free IM 09/16/2014, 09/22/2018    Influenza TIV (IM) 10/16/2015    Pneumococcal Polysaccharide PPV23 09/22/2014      Medicare Screening Tests and Risk Assessments:     Michell Argueta is here for his Subsequent Wellness visit  Health Risk Assessment:  Patient rates overall health as good  Patient feels that their physical health rating is Same  Eyesight was rated as Same  Hearing was rated as Same  Patient feels that their emotional and mental health rating is Same  Pain experienced by patient in the last 7 days has been Some  Patient's pain rating has been 5/10  Patient states that he has experienced no weight loss or gain in last 6 months  Emotional/Mental Health:  Patient has not been feeling nervous/anxious      PHQ-9 Depression Screening:    Frequency of the following problems over the past two weeks:      1  Little interest or pleasure in doing things: 0 - not at all      2  Feeling down, depressed, or hopeless: 0 - not at all  PHQ-2 Score: 0          Broken Bones/Falls: Fall Risk Assessment:    In the past year, patient has experienced: No history of falling in past year          Bladder/Bowel:  Patient has not leaked urine accidently in the last six months  Patient reports no loss of bowel control  Immunizations:  Patient has had a flu vaccination within the last year  Patient has not received a pneumonia shot  Patient has not received a shingles shot  Patient has not received tetanus/diphtheria shot  Home Safety:  Patient does not have trouble with stairs inside or outside of their home  Patient currently reports that there are no safety hazards present in home, working smoke alarms, working carbon monoxide detectors  Preventative Screenings:   prostate cancer screen performed, colon cancer screen completed, cholesterol screen completed, no glaucoma eye exam completed    Nutrition:  Current diet: Regular with servings of the following:    Medications:  Patient is not currently taking any over-the-counter supplements  Patient is able to manage medications  Lifestyle Choices:  Patient reports current tobacco use  Patient reports no alcohol use  Patient drives a vehicle  Patient wears seat belt  Current level of exercise of physical activity described by patient as: active at work and home  Activities of Daily Living:  Can get out of bed by his or her self, able to dress self, able to make own meals, able to do own shopping, able to bathe self, can do own laundry/housekeeping, can manage own money, pay bills and track expenses    Previous Hospitalizations:  No hospitalization or ED visit in past 12 months        Advanced Directives:  Patient has not decided on power of     Patient has not completed advanced directive  Preventative Screening/Counseling:      Cardiovascular:      General: Risks and Benefits Discussed and Screening Current          Diabetes:      General: Risks and Benefits Discussed and Screening Current          Colorectal Cancer:      General: Risks and Benefits Discussed and Screening Current          Prostate Cancer:      General: Risks and Benefits Discussed and Screening Current          Osteoporosis:      General: Risks and Benefits Discussed and Screening Not Indicated          AAA:  Male patient with age over [de-identified] years  General: Risks and Benefits Discussed and Screening Not Indicated          Glaucoma:      General: Risks and Benefits Discussed and Screening Current          HIV:      General: Risks and Benefits Discussed and Screening Not Indicated          Hepatitis C:      General: Risks and Benefits Discussed      Counseling: has received general HCV counseling      Additional Comments: Ordered    Advanced Directives:   Patient has living will for healthcare, does not have durable POA for healthcare, patient does not have an advanced directive  Information on ACP and/or AD not provided  No 5 wishes given  End of life assessment reviewed with patient  Provider agrees with end of life decisions        Immunizations:      Influenza: Influenza UTD This Year      Pneumococcal: Risks & Benefits Discussed and Pneumococcal Due Today      Shingrix: Risks & Benefits Discussed and Shingrix Vaccine Needed Today      Hepatitis B (Low risk patients): Series Not Indicated      Hepatitis B (Medium to high risk patients): Patient Declines      Zostavax: Risks & Benefits Discussed and Patient Declines      TD: Risks & Benefits Discussed and Td Vaccine Needed Today      TDAP: Tdap Vaccine Needed Today and Risks & Benefits Discussed

## 2019-07-15 NOTE — PROGRESS NOTES
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  Asymptomatic hyperuricemia    Peripheral edema  Slight worsening of peripheral edema  We will discontinue furosemide and initiate treatment with torsemide 40 mg daily  Follow-up labs in 1 month follow-up visit in 6 weeks  Diagnoses and all orders for this visit:    Need for hepatitis C screening test  -     Hepatitis C antibody; Future    Need for diphtheria-tetanus-pertussis (Tdap) vaccine  -     TDAP VACCINE GREATER THAN OR EQUAL TO 6YO IM    Need for pneumococcal vaccine  -     PNEUMOCOCCAL CONJUGATE VACCINE 13-VALENT GREATER THAN 6 MONTHS    Gout, arthropathy  -     allopurinol (ZYLOPRIM) 100 mg tablet; Take 1 tablet (100 mg total) by mouth daily        Subjective:      Patient ID: Nathaniel Muñoz is a 69 y o  male  Patient presents to the office for a follow-up visit  He is concerned at the nonresolution of his peripheral edema  It is not getting any worse but it is not getting any better  He denies any orthopnea, chest discomfort, palpitations, PND, exertional dyspnea, fevers, chills, cough or wheezing  His appetite has been good  His left hip has actually been doing quite good with use of tramadol  He is using only tramadol 1 tablet daily with sufficient pain control his labs are reviewed  He has had a slight increase in his BUN creatinine  Serum uric acid is 8 7 but he is not having any symptoms of gout  His blood pressure is well controlled on the current regimen        Family History   Problem Relation Age of Onset    Breast cancer Mother     Coronary artery disease Father      Social History     Socioeconomic History    Marital status: Significant Other     Spouse name: Not on file    Number of children: Not on file    Years of education: Not on file    Highest education level: Not on file   Occupational History    Not on file   Social Needs    Financial resource strain: Not on file    Food insecurity:     Worry: Not on file     Inability: Not on file    Transportation needs:     Medical: Not on file     Non-medical: Not on file   Tobacco Use    Smoking status: Current Some Day Smoker    Smokeless tobacco: Never Used    Tobacco comment: smokes a pipe 2 times daily   Substance and Sexual Activity    Alcohol use: No    Drug use: No    Sexual activity: Not on file   Lifestyle    Physical activity:     Days per week: Not on file     Minutes per session: Not on file    Stress: Not on file   Relationships    Social connections:     Talks on phone: Not on file     Gets together: Not on file     Attends Gnosticist service: Not on file     Active member of club or organization: Not on file     Attends meetings of clubs or organizations: Not on file     Relationship status: Not on file    Intimate partner violence:     Fear of current or ex partner: Not on file     Emotionally abused: Not on file     Physically abused: Not on file     Forced sexual activity: Not on file   Other Topics Concern    Not on file   Social History Narrative    Not on file     Past Medical History:   Diagnosis Date    Benign hypertension with chronic kidney disease, stage III ([facility] [address] ) 8/22/2016    Degenerative arthritis of hip 3/2/2015    Gout     Impacted cerumen of both ears     last assessed-2/9/2015    Renal calculi     last assessed-9/22/2014    Urinary problem     last assessed-9/22/2014    Uveitis     last assessed-2/22/2016       Current Outpatient Medications:     allopurinol (ZYLOPRIM) 100 mg tablet, Take 1 tablet (100 mg total) by mouth daily, Disp: 90 tablet, Rfl: 1    KLOR-CON M10 10 MEQ tablet, TAKE 1 TABLET BY MOUTH EVERY DAY, Disp: 30 tablet, Rfl: 3    lisinopril (ZESTRIL) 5 mg tablet, Take 1 tablet (5 mg total) by mouth every other day, Disp: 90 tablet, Rfl: 0    traMADol-acetaminophen (ULTRACET) 37 5-325 mg per tablet, Take 2 tablets by mouth every 6 (six) hours as needed for moderate pain, Disp: 100 tablet, Rfl: 0    mometasone (ELOCON) 0 1 % cream, Apply topically daily (Patient not taking: Reported on 7/15/2019), Disp: 45 g, Rfl: 0    tamsulosin (FLOMAX) 0 4 mg, Take 0 4 mg by mouth daily at bedtime  , Disp: , Rfl:     torsemide (DEMADEX) 20 mg tablet, Take 2 tablets (40 mg total) by mouth daily, Disp: 60 tablet, Rfl: 5  No Known Allergies  Past Surgical History:   Procedure Laterality Date    CATARACT EXTRACTION Right     [place] COLONOSCOPY FLX DX W/COLLJ SPEC WHEN PFRMD N/A 10/8/2018    Procedure: COLONOSCOPY;  Surgeon: Walter Dolan MD;  [facility]; Service: Gastroenterology    TONSILLECTOMY      last assessed-9/22/2014         Review of Systems   Constitutional: Negative  HENT: Negative  Eyes: Negative  Respiratory: Negative  Cardiovascular: Positive for leg swelling (Bilateral 2+ pretibial edema)  Gastrointestinal: Negative  Genitourinary: Negative  Musculoskeletal: Positive for arthralgias (Left hip pain)  Neurological: Negative  Hematological: Negative  Psychiatric/Behavioral: Negative  Objective:      /72 (BP Location: Left arm, Patient Position: Sitting, Cuff Size: Standard)   Pulse 89   Temp 98 7 °F (37 1 °C) (Oral)   Ht 5' 6 65" (1 693 m) Comment: with shoes  Wt 94 1 kg (207 lb 6 4 oz) Comment: with shoes  SpO2 98%   BMI 32 82 kg/m²          Physical Exam   Constitutional: He is oriented to person, place, and time  He appears well-developed and well-nourished  HENT:   Head: Normocephalic and atraumatic  Mouth/Throat: Abnormal dentition   Dental caries present  Eyes: Pupils are equal, round, and reactive to light  Conjunctivae are normal  No scleral icterus  Neck: Normal range of motion  No JVD present  Cardiovascular: Normal rate, regular rhythm and normal heart sounds  No murmur heard  Pulmonary/Chest: Effort normal and breath sounds normal  No respiratory distress  He has no wheezes  He has no rales  Abdominal: Soft  Bowel sounds are normal  He exhibits no distension  There is no tenderness  Musculoskeletal: Normal range of motion  He exhibits no edema or deformity  Neurological: He is alert and oriented to person, place, and time  No cranial nerve deficit or sensory deficit  Coordination normal    No focal deficits   Skin: Skin is warm and dry  Capillary refill takes less than 2 seconds  No rash noted  He is not diaphoretic  No erythema  Psychiatric: He has a normal mood and affect  His behavior is normal  Judgment and thought content normal    Vitals reviewed

## 2019-07-15 NOTE — ASSESSMENT & PLAN NOTE
Blood pressure is stable on current regimen  We will decrease lisinopril to every other day    We will change furosemide to torsemide 40 mg daily and follow-up labs in 1 month

## 2019-08-05 ENCOUNTER — APPOINTMENT (OUTPATIENT)
Dept: LAB | Facility: CLINIC | Age: 74
End: 2019-08-05
Payer: MEDICARE

## 2019-08-05 DIAGNOSIS — R60.9 PERIPHERAL EDEMA: ICD-10-CM

## 2019-08-05 DIAGNOSIS — I12.9 BENIGN HYPERTENSION WITH CHRONIC KIDNEY DISEASE: ICD-10-CM

## 2019-08-05 DIAGNOSIS — Z11.59 NEED FOR HEPATITIS C SCREENING TEST: ICD-10-CM

## 2019-08-05 LAB
ANION GAP SERPL CALCULATED.3IONS-SCNC: 3 MMOL/L (ref 4–13)
BUN SERPL-MCNC: 22 MG/DL (ref 5–25)
CALCIUM SERPL-MCNC: 9.2 MG/DL (ref 8.3–10.1)
CHLORIDE SERPL-SCNC: 107 MMOL/L (ref 100–108)
CO2 SERPL-SCNC: 29 MMOL/L (ref 21–32)
CREAT SERPL-MCNC: 1.83 MG/DL (ref 0.6–1.3)
GFR SERPL CREATININE-BSD FRML MDRD: 36 ML/MIN/1.73SQ M
GLUCOSE P FAST SERPL-MCNC: 113 MG/DL (ref 65–99)
HCV AB SER QL: NORMAL
POTASSIUM SERPL-SCNC: 4.1 MMOL/L (ref 3.5–5.3)
SODIUM SERPL-SCNC: 139 MMOL/L (ref 136–145)

## 2019-08-05 PROCEDURE — 36415 COLL VENOUS BLD VENIPUNCTURE: CPT

## 2019-08-05 PROCEDURE — 80048 BASIC METABOLIC PNL TOTAL CA: CPT

## 2019-08-05 PROCEDURE — 86803 HEPATITIS C AB TEST: CPT

## 2019-08-19 ENCOUNTER — OFFICE VISIT (OUTPATIENT)
Dept: INTERNAL MEDICINE CLINIC | Facility: CLINIC | Age: 74
End: 2019-08-19
Payer: MEDICARE

## 2019-08-19 VITALS
OXYGEN SATURATION: 96 % | HEIGHT: 67 IN | TEMPERATURE: 98.1 F | DIASTOLIC BLOOD PRESSURE: 66 MMHG | HEART RATE: 89 BPM | SYSTOLIC BLOOD PRESSURE: 108 MMHG | BODY MASS INDEX: 33.06 KG/M2 | WEIGHT: 210.6 LBS

## 2019-08-19 DIAGNOSIS — R60.9 PERIPHERAL EDEMA: ICD-10-CM

## 2019-08-19 DIAGNOSIS — M1A.3710 CHRONIC GOUT DUE TO RENAL IMPAIRMENT INVOLVING TOE OF RIGHT FOOT WITHOUT TOPHUS: ICD-10-CM

## 2019-08-19 DIAGNOSIS — I12.9 BENIGN HYPERTENSION WITH CHRONIC KIDNEY DISEASE, STAGE III (HCC): Primary | ICD-10-CM

## 2019-08-19 DIAGNOSIS — M16.12 PRIMARY OSTEOARTHRITIS OF LEFT HIP: ICD-10-CM

## 2019-08-19 DIAGNOSIS — M16.9 OSTEOARTHRITIS OF HIP, UNSPECIFIED LATERALITY, UNSPECIFIED OSTEOARTHRITIS TYPE: ICD-10-CM

## 2019-08-19 DIAGNOSIS — N18.30 BENIGN HYPERTENSION WITH CHRONIC KIDNEY DISEASE, STAGE III (HCC): Primary | ICD-10-CM

## 2019-08-19 PROCEDURE — 1124F ACP DISCUSS-NO DSCNMKR DOCD: CPT | Performed by: INTERNAL MEDICINE

## 2019-08-19 PROCEDURE — 99214 OFFICE O/P EST MOD 30 MIN: CPT | Performed by: INTERNAL MEDICINE

## 2019-08-19 NOTE — PROGRESS NOTES
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  Gout has been stable since initiating treatment with allopurinol  Degenerative arthritis of hip  Unchanged  Patient is able to function with the use of Ultracet on an as-needed basis    Peripheral edema  Patient taking torsemide only 20 mg daily  Renal function has remained stable  He is limited with bathroom time at work because of his work duties  He does not wish to take the torsemide when he comes home from work because of the need to sleep       Diagnoses and all orders for this visit:    Benign hypertension with chronic kidney disease, stage III ([facility])  -     CBC and Platelet; Future  -     Comprehensive metabolic panel; Future  -     Uric acid; Future    Osteoarthritis of hip, unspecified laterality, unspecified osteoarthritis type  -     traMADol-acetaminophen (ULTRACET) 37 5-325 mg per tablet; Take 2 tablets by mouth every 6 (six) hours as needed for moderate pain    Primary osteoarthritis of left hip  -     traMADol-acetaminophen (ULTRACET) 37 5-325 mg per tablet; Take 2 tablets by mouth every 6 (six) hours as needed for moderate pain    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  -     Comprehensive metabolic panel; Future  -     Uric acid; Future    Peripheral edema  -     Comprehensive metabolic panel; Future  -     Uric acid; Future          Subjective:      Patient ID: Gabriela Yadav is a 69 y o  male  Patient presents for follow-up visit  In general he is doing fairly well  His hip pain is stable he is able to work  He uses Ultracet as needed    He still continues with peripheral edema but is limited with his ability to take diuretics because of his work situation where it is difficult to get to a bathroom at times  He does not like to take the torsemide when he comes home because he needs to go to sleep and does not want to be running to the bathroom  Renal function is relatively stable  He has not been experiencing any issues with gout since beginning allopurinol  Uric acid will be rechecked in follow-up        Family History   Problem Relation Age of Onset    Breast cancer Mother     Coronary artery disease Father      Social History     Socioeconomic History    Marital status: Significant Other     Spouse name: Not on file    Number of children: Not on file    Years of education: Not on file    Highest education level: Not on file   Occupational History    Not on file   Social Needs    Financial resource strain: Not on file    Food insecurity:     Worry: Not on file     Inability: Not on file    Transportation needs:     Medical: Not on file     Non-medical: Not on file   Tobacco Use    Smoking status: Current Some Day Smoker    Smokeless tobacco: Never Used    Tobacco comment: smokes a pipe 3 times a week   Substance and Sexual Activity    Alcohol use: No    Drug use: No    Sexual activity: Not on file   Lifestyle    Physical activity:     Days per week: Not on file     Minutes per session: Not on file    Stress: Not on file   Relationships    Social connections:     Talks on phone: Not on file     Gets together: Not on file     Attends Gnosticism service: Not on file     Active member of club or organization: Not on file     Attends meetings of clubs or organizations: Not on file     Relationship status: Not on file    Intimate partner violence:     Fear of current or ex partner: Not on file     Emotionally abused: Not on file     Physically abused: Not on file     Forced sexual activity: Not on file   Other Topics Concern    Not on file   Social History Narrative    Not on file     Past Medical History:   Diagnosis Date    Benign hypertension with chronic kidney disease, stage III ([facility] [address] ) 8/22/2016    Degenerative arthritis of hip 3/2/2015    Gout     Impacted cerumen of both ears     last assessed-2/9/2015    Renal calculi     last assessed-9/22/2014    Urinary problem     last assessed-9/22/2014    Uveitis     last assessed-2/22/2016       Current Outpatient Medications:     allopurinol (ZYLOPRIM) 100 mg tablet, Take 1 tablet (100 mg total) by mouth daily, Disp: 90 tablet, Rfl: 1    KLOR-CON M10 10 MEQ tablet, TAKE 1 TABLET BY MOUTH EVERY DAY, Disp: 30 tablet, Rfl: 3    lisinopril (ZESTRIL) 5 mg tablet, Take 1 tablet (5 mg total) by mouth every other day, Disp: 90 tablet, Rfl: 0    tamsulosin (FLOMAX) 0 4 mg, Take 0 4 mg by mouth daily at bedtime  , Disp: , Rfl:     torsemide (DEMADEX) 20 mg tablet, Take 2 tablets (40 mg total) by mouth daily (Patient taking differently: Take 20 mg by mouth daily ), Disp: 60 tablet, Rfl: 5    traMADol-acetaminophen (ULTRACET) 37 5-325 mg per tablet, Take 2 tablets by mouth every 6 (six) hours as needed for moderate pain, Disp: 100 tablet, Rfl: 0    mometasone (ELOCON) 0 1 % cream, Apply topically daily (Patient not taking: Reported on 7/15/2019), Disp: 45 g, Rfl: 0  No Known Allergies  Past Surgical History:   Procedure Laterality Date    CATARACT EXTRACTION Right     [place] COLONOSCOPY FLX DX W/COLLJ SPEC WHEN PFRMD N/A 10/8/2018    Procedure: COLONOSCOPY;  Surgeon: Raysa Zeng MD;  [facility]; Service: Gastroenterology    TONSILLECTOMY      last assessed-9/22/2014         Review of Systems   Constitutional: Negative  HENT: Negative  Eyes: Negative  Respiratory: Negative  Cardiovascular: Positive for leg swelling (Chronic peripheral edema  )  Gastrointestinal: Negative  Musculoskeletal: Positive for arthralgias (Chronic left hip pain)  Neurological: Negative  Hematological: Negative  Psychiatric/Behavioral: Negative            Objective:      BP 108/66 (BP Location: Left arm, Patient Position: Sitting, Cuff Size: Large)   Pulse 89   Temp 98 1 °F (36 7 °C) (Oral)   Ht 5' 6 85" (1 698 m) Comment: with shoes  Wt 95 5 kg (210 lb 9 6 oz) Comment: with shoes  SpO2 96%   BMI 33 13 kg/m²  BMI Counseling: Body mass index is 33 13 kg/m²  Discussed the patient's BMI with him  The BMI is above average  No BMI follow-up plan is appropriate  Patient is 73 years old and weight reduction/weight gain would further complicate their underlying physical disability  Physical Exam   Constitutional: He is oriented to person, place, and time  He appears well-developed and well-nourished  No distress  HENT:   Head: Normocephalic and atraumatic  Mouth/Throat: Oropharynx is clear and moist    Eyes: Pupils are equal, round, and reactive to light  Conjunctivae are normal  Right eye exhibits no discharge  Left eye exhibits no discharge  No scleral icterus  Neck: Neck supple  No JVD present  No tracheal deviation present  No thyromegaly present  Cardiovascular: Normal rate, regular rhythm and normal heart sounds  No murmur heard  Pulmonary/Chest: Effort normal and breath sounds normal  No respiratory distress  He has no wheezes  He has no rales  Abdominal: Soft  Bowel sounds are normal  He exhibits no distension  There is no tenderness  Musculoskeletal: He exhibits edema (2+ pretibial and pedal edema bilaterally)  Lymphadenopathy:     He has no cervical adenopathy  Neurological: He is alert and oriented to person, place, and time  No cranial nerve deficit  Coordination normal    No focal motor deficits  Skin: Skin is warm and dry  Capillary refill takes less than 2 seconds  He is not diaphoretic  No erythema  Psychiatric: He has a normal mood and affect  His behavior is normal    Vitals reviewed

## 2019-08-19 NOTE — ASSESSMENT & PLAN NOTE
Will continue current medications    Patient is only taking torsemide 20 mg daily because of limitations with past from opportunities at his work site

## 2019-08-19 NOTE — ASSESSMENT & PLAN NOTE
Patient taking torsemide only 20 mg daily  Renal function has remained stable  He is limited with bathroom time at work because of his work duties    He does not wish to take the torsemide when he comes home from work because of the need to sleep

## 2019-08-20 DIAGNOSIS — I12.9 BENIGN HYPERTENSION WITH CHRONIC KIDNEY DISEASE: ICD-10-CM

## 2019-08-21 RX ORDER — POTASSIUM CHLORIDE 750 MG/1
TABLET, EXTENDED RELEASE ORAL
Qty: 90 TABLET | Refills: 1 | Status: SHIPPED | OUTPATIENT
Start: 2019-08-21 | End: 2020-03-09 | Stop reason: SDUPTHER

## 2019-08-27 DIAGNOSIS — I12.9 BENIGN HYPERTENSION WITH CHRONIC KIDNEY DISEASE: ICD-10-CM

## 2019-08-28 RX ORDER — LISINOPRIL 5 MG/1
TABLET ORAL
Qty: 90 TABLET | Refills: 0 | OUTPATIENT
Start: 2019-08-28

## 2019-09-17 DIAGNOSIS — I12.9 BENIGN HYPERTENSION WITH CHRONIC KIDNEY DISEASE: ICD-10-CM

## 2019-09-17 RX ORDER — LISINOPRIL 5 MG/1
5 TABLET ORAL EVERY OTHER DAY
Qty: 90 TABLET | Refills: 0 | Status: SHIPPED | OUTPATIENT
Start: 2019-09-17 | End: 2020-03-30 | Stop reason: SDUPTHER

## 2019-11-12 LAB
ALBUMIN SERPL-MCNC: 4.2 G/DL (ref 3.6–5.1)
ALBUMIN/GLOB SERPL: 1.6 (CALC) (ref 1–2.5)
ALP SERPL-CCNC: 53 U/L (ref 40–115)
ALT SERPL-CCNC: 20 U/L (ref 9–46)
AST SERPL-CCNC: 16 U/L (ref 10–35)
BASOPHILS # BLD AUTO: 57 CELLS/UL (ref 0–200)
BASOPHILS NFR BLD AUTO: 0.8 %
BILIRUB SERPL-MCNC: 1.4 MG/DL (ref 0.2–1.2)
BUN SERPL-MCNC: 21 MG/DL (ref 7–25)
BUN/CREAT SERPL: 13 (CALC) (ref 6–22)
CALCIUM SERPL-MCNC: 9.2 MG/DL (ref 8.6–10.3)
CHLORIDE SERPL-SCNC: 105 MMOL/L (ref 98–110)
CO2 SERPL-SCNC: 28 MMOL/L (ref 20–32)
CREAT SERPL-MCNC: 1.68 MG/DL (ref 0.7–1.18)
EOSINOPHIL # BLD AUTO: 220 CELLS/UL (ref 15–500)
EOSINOPHIL NFR BLD AUTO: 3.1 %
ERYTHROCYTE [DISTWIDTH] IN BLOOD BY AUTOMATED COUNT: 13 % (ref 11–15)
GLOBULIN SER CALC-MCNC: 2.6 G/DL (CALC) (ref 1.9–3.7)
GLUCOSE SERPL-MCNC: 127 MG/DL (ref 65–99)
HCT VFR BLD AUTO: 41.8 % (ref 38.5–50)
HGB BLD-MCNC: 14.4 G/DL (ref 13.2–17.1)
LYMPHOCYTES # BLD AUTO: 1740 CELLS/UL (ref 850–3900)
LYMPHOCYTES NFR BLD AUTO: 24.5 %
MCH RBC QN AUTO: 31.5 PG (ref 27–33)
MCHC RBC AUTO-ENTMCNC: 34.4 G/DL (ref 32–36)
MCV RBC AUTO: 91.5 FL (ref 80–100)
MONOCYTES # BLD AUTO: 469 CELLS/UL (ref 200–950)
MONOCYTES NFR BLD AUTO: 6.6 %
NEUTROPHILS # BLD AUTO: 4615 CELLS/UL (ref 1500–7800)
NEUTROPHILS NFR BLD AUTO: 65 %
PLATELET # BLD AUTO: 299 THOUSAND/UL (ref 140–400)
PMV BLD REES-ECKER: 9.1 FL (ref 7.5–12.5)
POTASSIUM SERPL-SCNC: 4.6 MMOL/L (ref 3.5–5.3)
PROT SERPL-MCNC: 6.8 G/DL (ref 6.1–8.1)
RBC # BLD AUTO: 4.57 MILLION/UL (ref 4.2–5.8)
SL AMB EGFR AFRICAN AMERICAN: 46 ML/MIN/1.73M2
SL AMB EGFR NON AFRICAN AMERICAN: 40 ML/MIN/1.73M2
SODIUM SERPL-SCNC: 140 MMOL/L (ref 135–146)
URATE SERPL-MCNC: 8.3 MG/DL (ref 4–8)
WBC # BLD AUTO: 7.1 THOUSAND/UL (ref 3.8–10.8)

## 2019-12-02 DIAGNOSIS — M16.9 OSTEOARTHRITIS OF HIP, UNSPECIFIED LATERALITY, UNSPECIFIED OSTEOARTHRITIS TYPE: ICD-10-CM

## 2019-12-02 DIAGNOSIS — M16.12 PRIMARY OSTEOARTHRITIS OF LEFT HIP: ICD-10-CM

## 2019-12-23 ENCOUNTER — OFFICE VISIT (OUTPATIENT)
Dept: INTERNAL MEDICINE CLINIC | Facility: CLINIC | Age: 74
End: 2019-12-23
Payer: MEDICARE

## 2019-12-23 VITALS
OXYGEN SATURATION: 98 % | BODY MASS INDEX: 33.46 KG/M2 | WEIGHT: 213.2 LBS | HEIGHT: 67 IN | TEMPERATURE: 98.3 F | DIASTOLIC BLOOD PRESSURE: 66 MMHG | HEART RATE: 97 BPM | SYSTOLIC BLOOD PRESSURE: 118 MMHG

## 2019-12-23 DIAGNOSIS — M10.9 GOUT, ARTHROPATHY: ICD-10-CM

## 2019-12-23 DIAGNOSIS — N18.30 BENIGN HYPERTENSION WITH CHRONIC KIDNEY DISEASE, STAGE III (HCC): ICD-10-CM

## 2019-12-23 DIAGNOSIS — R60.9 PERIPHERAL EDEMA: ICD-10-CM

## 2019-12-23 DIAGNOSIS — I12.9 BENIGN HYPERTENSION WITH CHRONIC KIDNEY DISEASE, STAGE III (HCC): ICD-10-CM

## 2019-12-23 DIAGNOSIS — M1A.3710 CHRONIC GOUT DUE TO RENAL IMPAIRMENT INVOLVING TOE OF RIGHT FOOT WITHOUT TOPHUS: Primary | ICD-10-CM

## 2019-12-23 DIAGNOSIS — R97.20 ELEVATED PSA: ICD-10-CM

## 2019-12-23 PROCEDURE — 99214 OFFICE O/P EST MOD 30 MIN: CPT | Performed by: INTERNAL MEDICINE

## 2019-12-23 RX ORDER — ALLOPURINOL 100 MG/1
200 TABLET ORAL DAILY
Qty: 180 TABLET | Refills: 1 | Status: SHIPPED | OUTPATIENT
Start: 2019-12-23 | End: 2020-06-11

## 2019-12-23 RX ORDER — FUROSEMIDE 20 MG/1
20 TABLET ORAL DAILY
Refills: 5 | COMMUNITY
Start: 2019-09-19 | End: 2019-12-23 | Stop reason: CLARIF

## 2019-12-23 NOTE — ASSESSMENT & PLAN NOTE
No recent symptoms of gouty arthropathy  Uric acid level is elevated at 8 3    We will increase allopurinol to 200 milligrams daily and if tolerated, 300 milligrams daily

## 2019-12-23 NOTE — PROGRESS NOTES
Assessment/Plan:    Peripheral edema  Edema is stable on current medications  No changes necessary to current dosing of diuretics    Elevated PSA  With being followed by [facility]  He continues on Flomax  His most recent PSA did not show an increase but remained stable  Previous prostate biopsy was benign  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  No recent symptoms of gouty arthropathy  Uric acid level is elevated at 8 3  We will increase allopurinol to 200 milligrams daily and if tolerated, 300 milligrams daily    Benign hypertension with chronic kidney disease, stage III  Blood pressure is nicely controlled on current medications  Diagnoses and all orders for this visit:    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    Benign hypertension with chronic kidney disease, stage III ([facility] [address] )  -     Basic metabolic panel; Future  -     CBC and Platelet; Future    Peripheral edema    Elevated PSA    Gout, arthropathy  -     allopurinol (ZYLOPRIM) 100 mg tablet; Take 2 tablets (200 mg total) by mouth daily  -     Uric acid; Future    Other orders  -     Discontinue: furosemide (LASIX) 20 mg tablet; Take 20 mg by mouth daily          Subjective:      Patient ID: Zach Suresh is a 69 y o  male  Patient presents for follow-up visit  In general he has no significant complaints  He has not experienced any episodes of gout attacks  He is not experiencing any significant issues voiding  He is followed by Urology for follow-up for his elevated PSA  No interventions have been undertaken his most recent PSA showed a slight drop from previous levels  He denies recurrent nocturia  He does have some chronic left hip pain but it is not severe enough for him to want to pursue arthroplasty at this time  Appetite has been good  Patient denies any chest pain, palpitations, nausea or vomiting    He denies any issues with chest pain or palpitations  Labs are reviewed  Uric acid is elevated at 8 3  Renal function is stable  BUN is 21 creatinine 1 68 with an estimated GFR 40  CBC is within normal limits        Family History   Problem Relation Age of Onset    Breast cancer Mother     Coronary artery disease Father      Social History     Socioeconomic History    Marital status: Significant Other     Spouse name: Not on file    Number of children: Not on file    Years of education: Not on file    Highest education level: Not on file   Occupational History    Not on file   Social Needs    Financial resource strain: Not on file    Food insecurity:     Worry: Not on file     Inability: Not on file    Transportation needs:     Medical: Not on file     Non-medical: Not on file   Tobacco Use    Smoking status: Current Some Day Smoker     Types: Pipe    Smokeless tobacco: Never Used    Tobacco comment: smokes a pipe 3 times a week   Substance and Sexual Activity    Alcohol use: No    Drug use: No    Sexual activity: Not on file   Lifestyle    Physical activity:     Days per week: Not on file     Minutes per session: Not on file    Stress: Not on file   Relationships    Social connections:     Talks on phone: Not on file     Gets together: Not on file     Attends [employer] service: Not on file     Active member of club or organization: Not on file     Attends meetings of clubs or organizations: Not on file     Relationship status: Not on file    Intimate partner violence:     Fear of current or ex partner: Not on file     Emotionally abused: Not on file     Physically abused: Not on file     Forced sexual activity: Not on file   Other Topics Concern    Not on file   Social History Narrative    Not on file     Past Medical History:   Diagnosis Date    Benign hypertension with chronic kidney disease, stage III ([facility] [address] ) 8/22/2016    Colon polyps     Degenerative arthritis of hip 3/2/2015    Gout     Impacted cerumen of both ears     last assessed-2/9/2015    Renal calculi     last assessed-9/22/2014    Urinary problem     last assessed-9/22/2014    Uveitis     last assessed-2/22/2016       Current Outpatient Medications:     allopurinol (ZYLOPRIM) 100 mg tablet, Take 2 tablets (200 mg total) by mouth daily, Disp: 180 tablet, Rfl: 1    KLOR-CON M10 10 MEQ tablet, TAKE 1 TABLET BY MOUTH EVERY DAY, Disp: 90 tablet, Rfl: 1    lisinopril (ZESTRIL) 5 mg tablet, Take 1 tablet (5 mg total) by mouth every other day, Disp: 90 tablet, Rfl: 0    tamsulosin (FLOMAX) 0 4 mg, Take 0 4 mg by mouth daily at bedtime  , Disp: , Rfl:     torsemide (DEMADEX) 20 mg tablet, Take 2 tablets (40 mg total) by mouth daily (Patient taking differently: Take 20 mg by mouth daily ), Disp: 60 tablet, Rfl: 5    traMADol-acetaminophen (ULTRACET) 37 5-325 mg per tablet, Take 2 tablets by mouth every 6 (six) hours as needed for moderate pain, Disp: 100 tablet, Rfl: 0  No Known Allergies  Past Surgical History:   Procedure Laterality Date    CATARACT EXTRACTION Right     [place] COLONOSCOPY FLX DX W/COLLJ SPEC WHEN PFRMD N/A 10/8/2018    Procedure: COLONOSCOPY;  Surgeon: Saúl Lacey MD;  [facility]: [facility]; Service: Gastroenterology    TONSILLECTOMY      last assessed-9/22/2014         Review of Systems   Constitutional: Negative  HENT: Negative  Eyes: Negative  Respiratory: Negative  Cardiovascular: Negative  Gastrointestinal: Negative  Genitourinary: Negative for difficulty urinating, dysuria, frequency and urgency  Musculoskeletal: Positive for arthralgias (Chronic left hip pain)  Hematological: Negative  Psychiatric/Behavioral: Negative  Objective:      /66 (BP Location: Left arm, Patient Position: Sitting, Cuff Size: Large)   Pulse 97   Temp 98 3 °F (36 8 °C) (Oral)   Ht 5' 6 73" (1 695 m) Comment: with shoes  Wt 96 7 kg (213 lb 3 2 oz) Comment: with shoes  SpO2 98%   BMI 33 66 kg/m²  BMI Counseling:  Body mass index is 33 66 kg/m²  The BMI is above normal  Nutrition recommendations include reducing portion sizes, 3-5 servings of fruits/vegetables daily, reducing fast food intake, decreasing soda and/or juice intake, moderation in carbohydrate intake and reducing intake of saturated fat and trans fat  Physical Exam   Constitutional: He is oriented to person, place, and time  He appears well-developed and well-nourished  No distress  HENT:   Head: Normocephalic  Right Ear: External ear normal    Left Ear: External ear normal    Eyes: Pupils are equal, round, and reactive to light  Conjunctivae are normal  No scleral icterus  Neck: Neck supple  No JVD present  No tracheal deviation present  Cardiovascular: Normal rate, regular rhythm and normal heart sounds  Pulmonary/Chest: Effort normal and breath sounds normal  No respiratory distress  Abdominal: Soft  Bowel sounds are normal  There is no tenderness  Musculoskeletal: He exhibits edema (Bilateral 1+ pretibial edema right greater than left)  He exhibits no tenderness  Neurological: He is alert and oriented to person, place, and time  No cranial nerve deficit  Coordination (Ambulates with a limp favoring left hip due to arthritis) abnormal    Grossly, no focal motor deficits  Skin: Skin is warm and dry  Capillary refill takes less than 2 seconds  No erythema  Psychiatric: He has a normal mood and affect  His behavior is normal    Vitals reviewed

## 2019-12-23 NOTE — ASSESSMENT & PLAN NOTE
With being followed by [facility]  He continues on Flomax  His most recent PSA did not show an increase but remained stable  Previous prostate biopsy was benign

## 2020-03-09 DIAGNOSIS — I12.9 BENIGN HYPERTENSION WITH CHRONIC KIDNEY DISEASE: ICD-10-CM

## 2020-03-09 RX ORDER — POTASSIUM CHLORIDE 750 MG/1
10 TABLET, EXTENDED RELEASE ORAL DAILY
Qty: 90 TABLET | Refills: 1 | Status: SHIPPED | OUTPATIENT
Start: 2020-03-09 | End: 2020-09-02 | Stop reason: SDUPTHER

## 2020-03-10 DIAGNOSIS — I12.9 BENIGN HYPERTENSION WITH CHRONIC KIDNEY DISEASE: ICD-10-CM

## 2020-03-10 RX ORDER — LISINOPRIL 5 MG/1
TABLET ORAL
Qty: 45 TABLET | Refills: 1 | OUTPATIENT
Start: 2020-03-10

## 2020-03-30 DIAGNOSIS — M16.12 PRIMARY OSTEOARTHRITIS OF LEFT HIP: ICD-10-CM

## 2020-03-30 DIAGNOSIS — M16.9 OSTEOARTHRITIS OF HIP, UNSPECIFIED LATERALITY, UNSPECIFIED OSTEOARTHRITIS TYPE: ICD-10-CM

## 2020-03-30 DIAGNOSIS — I12.9 BENIGN HYPERTENSION WITH CHRONIC KIDNEY DISEASE: ICD-10-CM

## 2020-03-30 RX ORDER — LISINOPRIL 5 MG/1
5 TABLET ORAL EVERY OTHER DAY
Qty: 90 TABLET | Refills: 1 | Status: SHIPPED | OUTPATIENT
Start: 2020-03-30 | End: 2021-01-06 | Stop reason: SDUPTHER

## 2020-06-01 LAB
BASOPHILS # BLD AUTO: 52 CELLS/UL (ref 0–200)
BASOPHILS NFR BLD AUTO: 0.9 %
BUN SERPL-MCNC: 20 MG/DL (ref 7–25)
BUN/CREAT SERPL: 13 (CALC) (ref 6–22)
CALCIUM SERPL-MCNC: 9.1 MG/DL (ref 8.6–10.3)
CHLORIDE SERPL-SCNC: 110 MMOL/L (ref 98–110)
CO2 SERPL-SCNC: 27 MMOL/L (ref 20–32)
CREAT SERPL-MCNC: 1.55 MG/DL (ref 0.7–1.18)
EOSINOPHIL # BLD AUTO: 122 CELLS/UL (ref 15–500)
EOSINOPHIL NFR BLD AUTO: 2.1 %
ERYTHROCYTE [DISTWIDTH] IN BLOOD BY AUTOMATED COUNT: 13.2 % (ref 11–15)
GLUCOSE SERPL-MCNC: 109 MG/DL (ref 65–99)
HCT VFR BLD AUTO: 43.8 % (ref 38.5–50)
HGB BLD-MCNC: 14.9 G/DL (ref 13.2–17.1)
LYMPHOCYTES # BLD AUTO: 1914 CELLS/UL (ref 850–3900)
LYMPHOCYTES NFR BLD AUTO: 33 %
MCH RBC QN AUTO: 31 PG (ref 27–33)
MCHC RBC AUTO-ENTMCNC: 34 G/DL (ref 32–36)
MCV RBC AUTO: 91.3 FL (ref 80–100)
MONOCYTES # BLD AUTO: 447 CELLS/UL (ref 200–950)
MONOCYTES NFR BLD AUTO: 7.7 %
NEUTROPHILS # BLD AUTO: 3265 CELLS/UL (ref 1500–7800)
NEUTROPHILS NFR BLD AUTO: 56.3 %
PLATELET # BLD AUTO: 273 THOUSAND/UL (ref 140–400)
PMV BLD REES-ECKER: 9.4 FL (ref 7.5–12.5)
POTASSIUM SERPL-SCNC: 4.6 MMOL/L (ref 3.5–5.3)
RBC # BLD AUTO: 4.8 MILLION/UL (ref 4.2–5.8)
SL AMB EGFR AFRICAN AMERICAN: 51 ML/MIN/1.73M2
SL AMB EGFR NON AFRICAN AMERICAN: 44 ML/MIN/1.73M2
SODIUM SERPL-SCNC: 142 MMOL/L (ref 135–146)
URATE SERPL-MCNC: 6.4 MG/DL (ref 4–8)
WBC # BLD AUTO: 5.8 THOUSAND/UL (ref 3.8–10.8)

## 2020-06-10 DIAGNOSIS — M10.9 GOUT, ARTHROPATHY: ICD-10-CM

## 2020-06-11 RX ORDER — ALLOPURINOL 100 MG/1
TABLET ORAL
Qty: 180 TABLET | Refills: 1 | Status: SHIPPED | OUTPATIENT
Start: 2020-06-11 | End: 2020-06-29

## 2020-06-29 ENCOUNTER — OFFICE VISIT (OUTPATIENT)
Dept: INTERNAL MEDICINE CLINIC | Facility: CLINIC | Age: 75
End: 2020-06-29
Payer: MEDICARE

## 2020-06-29 VITALS
BODY MASS INDEX: 33.21 KG/M2 | HEART RATE: 92 BPM | TEMPERATURE: 97.6 F | WEIGHT: 211.6 LBS | HEIGHT: 67 IN | OXYGEN SATURATION: 97 % | SYSTOLIC BLOOD PRESSURE: 116 MMHG | DIASTOLIC BLOOD PRESSURE: 72 MMHG

## 2020-06-29 DIAGNOSIS — R60.9 PERIPHERAL EDEMA: ICD-10-CM

## 2020-06-29 DIAGNOSIS — M1A.3710 CHRONIC GOUT DUE TO RENAL IMPAIRMENT INVOLVING TOE OF RIGHT FOOT WITHOUT TOPHUS: ICD-10-CM

## 2020-06-29 DIAGNOSIS — M16.12 PRIMARY OSTEOARTHRITIS OF LEFT HIP: Primary | ICD-10-CM

## 2020-06-29 DIAGNOSIS — I12.9 BENIGN HYPERTENSION WITH CHRONIC KIDNEY DISEASE, STAGE III (HCC): ICD-10-CM

## 2020-06-29 DIAGNOSIS — M10.9 GOUT, ARTHROPATHY: ICD-10-CM

## 2020-06-29 DIAGNOSIS — R73.01 ELEVATED FASTING GLUCOSE: ICD-10-CM

## 2020-06-29 DIAGNOSIS — N18.30 BENIGN HYPERTENSION WITH CHRONIC KIDNEY DISEASE, STAGE III (HCC): ICD-10-CM

## 2020-06-29 DIAGNOSIS — N18.9 CHRONIC KIDNEY DISEASE, UNSPECIFIED CKD STAGE: ICD-10-CM

## 2020-06-29 PROCEDURE — 3074F SYST BP LT 130 MM HG: CPT | Performed by: INTERNAL MEDICINE

## 2020-06-29 PROCEDURE — 99214 OFFICE O/P EST MOD 30 MIN: CPT | Performed by: INTERNAL MEDICINE

## 2020-06-29 PROCEDURE — 3078F DIAST BP <80 MM HG: CPT | Performed by: INTERNAL MEDICINE

## 2020-06-29 PROCEDURE — 4040F PNEUMOC VAC/ADMIN/RCVD: CPT | Performed by: INTERNAL MEDICINE

## 2020-06-29 PROCEDURE — 1160F RVW MEDS BY RX/DR IN RCRD: CPT | Performed by: INTERNAL MEDICINE

## 2020-06-29 PROCEDURE — 3008F BODY MASS INDEX DOCD: CPT | Performed by: INTERNAL MEDICINE

## 2020-06-29 RX ORDER — ALLOPURINOL 300 MG/1
300 TABLET ORAL DAILY
Qty: 90 TABLET | Refills: 1 | Status: SHIPPED | OUTPATIENT
Start: 2020-06-29 | End: 2021-01-25

## 2020-07-27 DIAGNOSIS — M16.9 OSTEOARTHRITIS OF HIP, UNSPECIFIED LATERALITY, UNSPECIFIED OSTEOARTHRITIS TYPE: ICD-10-CM

## 2020-07-27 DIAGNOSIS — M16.12 PRIMARY OSTEOARTHRITIS OF LEFT HIP: ICD-10-CM

## 2020-08-27 DIAGNOSIS — I12.9 BENIGN HYPERTENSION WITH CHRONIC KIDNEY DISEASE: ICD-10-CM

## 2020-09-02 DIAGNOSIS — I12.9 BENIGN HYPERTENSION WITH CHRONIC KIDNEY DISEASE: ICD-10-CM

## 2020-09-02 RX ORDER — POTASSIUM CHLORIDE 750 MG/1
TABLET, EXTENDED RELEASE ORAL
Qty: 90 TABLET | Refills: 1 | OUTPATIENT
Start: 2020-09-02

## 2020-09-03 RX ORDER — POTASSIUM CHLORIDE 750 MG/1
10 TABLET, EXTENDED RELEASE ORAL DAILY
Qty: 90 TABLET | Refills: 1 | Status: SHIPPED | OUTPATIENT
Start: 2020-09-03 | End: 2021-02-23

## 2020-12-10 LAB
ALBUMIN SERPL-MCNC: 4.1 G/DL (ref 3.6–5.1)
ALBUMIN/GLOB SERPL: 2 (CALC) (ref 1–2.5)
ALP SERPL-CCNC: 47 U/L (ref 35–144)
ALT SERPL-CCNC: 20 U/L (ref 9–46)
AST SERPL-CCNC: 16 U/L (ref 10–35)
BASOPHILS # BLD AUTO: 50 CELLS/UL (ref 0–200)
BASOPHILS NFR BLD AUTO: 0.7 %
BILIRUB SERPL-MCNC: 1.6 MG/DL (ref 0.2–1.2)
BUN SERPL-MCNC: 22 MG/DL (ref 7–25)
BUN/CREAT SERPL: 15 (CALC) (ref 6–22)
CALCIUM SERPL-MCNC: 9 MG/DL (ref 8.6–10.3)
CHLORIDE SERPL-SCNC: 107 MMOL/L (ref 98–110)
CHOLEST SERPL-MCNC: 140 MG/DL
CHOLEST/HDLC SERPL: 3.4 (CALC)
CO2 SERPL-SCNC: 26 MMOL/L (ref 20–32)
CREAT SERPL-MCNC: 1.5 MG/DL (ref 0.7–1.18)
EOSINOPHIL # BLD AUTO: 209 CELLS/UL (ref 15–500)
EOSINOPHIL NFR BLD AUTO: 2.9 %
ERYTHROCYTE [DISTWIDTH] IN BLOOD BY AUTOMATED COUNT: 12.9 % (ref 11–15)
EST. AVERAGE GLUCOSE BLD GHB EST-MCNC: 100 (CALC)
EST. AVERAGE GLUCOSE BLD GHB EST-SCNC: 5.5 (CALC)
GLOBULIN SER CALC-MCNC: 2.1 G/DL (CALC) (ref 1.9–3.7)
GLUCOSE SERPL-MCNC: 106 MG/DL (ref 65–99)
HBA1C MFR BLD: 5.1 % OF TOTAL HGB
HCT VFR BLD AUTO: 41.7 % (ref 38.5–50)
HDLC SERPL-MCNC: 41 MG/DL
HGB BLD-MCNC: 14.4 G/DL (ref 13.2–17.1)
LDLC SERPL CALC-MCNC: 78 MG/DL (CALC)
LYMPHOCYTES # BLD AUTO: 2131 CELLS/UL (ref 850–3900)
LYMPHOCYTES NFR BLD AUTO: 29.6 %
MCH RBC QN AUTO: 31.4 PG (ref 27–33)
MCHC RBC AUTO-ENTMCNC: 34.5 G/DL (ref 32–36)
MCV RBC AUTO: 90.8 FL (ref 80–100)
MONOCYTES # BLD AUTO: 590 CELLS/UL (ref 200–950)
MONOCYTES NFR BLD AUTO: 8.2 %
NEUTROPHILS # BLD AUTO: 4219 CELLS/UL (ref 1500–7800)
NEUTROPHILS NFR BLD AUTO: 58.6 %
NONHDLC SERPL-MCNC: 99 MG/DL (CALC)
PLATELET # BLD AUTO: 282 THOUSAND/UL (ref 140–400)
PMV BLD REES-ECKER: 9.5 FL (ref 7.5–12.5)
POTASSIUM SERPL-SCNC: 4.6 MMOL/L (ref 3.5–5.3)
PROT SERPL-MCNC: 6.2 G/DL (ref 6.1–8.1)
RBC # BLD AUTO: 4.59 MILLION/UL (ref 4.2–5.8)
SL AMB EGFR AFRICAN AMERICAN: 52 ML/MIN/1.73M2
SL AMB EGFR NON AFRICAN AMERICAN: 45 ML/MIN/1.73M2
SODIUM SERPL-SCNC: 139 MMOL/L (ref 135–146)
TRIGL SERPL-MCNC: 119 MG/DL
WBC # BLD AUTO: 7.2 THOUSAND/UL (ref 3.8–10.8)

## 2021-01-04 ENCOUNTER — TRANSITIONAL CARE MANAGEMENT (OUTPATIENT)
Dept: INTERNAL MEDICINE CLINIC | Age: 76
End: 2021-01-04

## 2021-01-06 DIAGNOSIS — M16.9 OSTEOARTHRITIS OF HIP, UNSPECIFIED LATERALITY, UNSPECIFIED OSTEOARTHRITIS TYPE: ICD-10-CM

## 2021-01-06 DIAGNOSIS — I12.9 BENIGN HYPERTENSION WITH CHRONIC KIDNEY DISEASE: ICD-10-CM

## 2021-01-06 DIAGNOSIS — M16.12 PRIMARY OSTEOARTHRITIS OF LEFT HIP: ICD-10-CM

## 2021-01-06 RX ORDER — LISINOPRIL 5 MG/1
5 TABLET ORAL EVERY OTHER DAY
Qty: 90 TABLET | Refills: 1 | Status: SHIPPED | OUTPATIENT
Start: 2021-01-06 | End: 2021-04-02

## 2021-01-07 RX ORDER — DEXAMETHASONE 6 MG/1
6 TABLET ORAL DAILY
COMMUNITY
Start: 2021-01-02 | End: 2021-01-07

## 2021-01-18 ENCOUNTER — TELEPHONE (OUTPATIENT)
Dept: INTERNAL MEDICINE CLINIC | Facility: CLINIC | Age: 76
End: 2021-01-18

## 2021-01-18 NOTE — TELEPHONE ENCOUNTER
Patient was scheduled for AWV on 1/19/2021  Patient's girlfriend called to cancel as patient is still in hospital   Ray Castro stated she will have him call to reschedule once he is discharged

## 2021-01-25 ENCOUNTER — NURSING HOME VISIT (OUTPATIENT)
Dept: GERIATRICS | Facility: OTHER | Age: 76
End: 2021-01-25
Payer: MEDICARE

## 2021-01-25 VITALS
RESPIRATION RATE: 22 BRPM | HEART RATE: 80 BPM | BODY MASS INDEX: 28.8 KG/M2 | TEMPERATURE: 97.9 F | SYSTOLIC BLOOD PRESSURE: 130 MMHG | DIASTOLIC BLOOD PRESSURE: 78 MMHG | WEIGHT: 182.4 LBS

## 2021-01-25 DIAGNOSIS — J93.9 BILATERAL PNEUMOTHORACES: ICD-10-CM

## 2021-01-25 DIAGNOSIS — J96.90 RESPIRATORY FAILURE (HCC): ICD-10-CM

## 2021-01-25 DIAGNOSIS — J12.82 PNEUMONIA DUE TO COVID-19 VIRUS: ICD-10-CM

## 2021-01-25 DIAGNOSIS — M1A.3710 CHRONIC GOUT DUE TO RENAL IMPAIRMENT INVOLVING TOE OF RIGHT FOOT WITHOUT TOPHUS: ICD-10-CM

## 2021-01-25 DIAGNOSIS — N18.9 CKD (CHRONIC KIDNEY DISEASE): ICD-10-CM

## 2021-01-25 DIAGNOSIS — R33.9 URINARY RETENTION: ICD-10-CM

## 2021-01-25 DIAGNOSIS — I12.9 BENIGN HYPERTENSION WITH CHRONIC KIDNEY DISEASE, STAGE III (HCC): Primary | ICD-10-CM

## 2021-01-25 DIAGNOSIS — U07.1 PNEUMONIA DUE TO COVID-19 VIRUS: ICD-10-CM

## 2021-01-25 DIAGNOSIS — N18.30 BENIGN HYPERTENSION WITH CHRONIC KIDNEY DISEASE, STAGE III (HCC): Primary | ICD-10-CM

## 2021-01-25 DIAGNOSIS — M10.9 GOUT, ARTHROPATHY: ICD-10-CM

## 2021-01-25 PROCEDURE — 99305 1ST NF CARE MODERATE MDM 35: CPT | Performed by: INTERNAL MEDICINE

## 2021-01-25 RX ORDER — ALLOPURINOL 300 MG/1
TABLET ORAL
Qty: 90 TABLET | Refills: 1 | Status: SHIPPED | OUTPATIENT
Start: 2021-01-25 | End: 2021-05-26 | Stop reason: SDUPTHER

## 2021-01-25 NOTE — ASSESSMENT & PLAN NOTE
-documented in previous admission; due to COVID-19 pneumonia and bilateral pneumothorax  -has since improved and appears to have resolved    -will continue to monitor

## 2021-01-25 NOTE — ASSESSMENT & PLAN NOTE
Lab Results   Component Value Date    EGFR 36 08/05/2019    CREATININE 1 50 (H) 12/09/2020    CREATININE 1 55 (H) 06/01/2020    CREATININE 1 68 (H) 11/11/2019     -present on admission  -continuing lisinopril 5 mg OD, continuing potassium chloride 10 mEq OD

## 2021-01-25 NOTE — ASSESSMENT & PLAN NOTE
-per chart review, documented in recent admission  -patient placed on Nolasco's    -will continue to clinically monitor  -continuing tamsulosin 0 4 mg OD

## 2021-01-25 NOTE — ASSESSMENT & PLAN NOTE
-present on admission  -per chart review, CT showed subpleural cysts with largest cyst containing air-fluid level in the superior segment of right lower lobe which could be the cause of her pneumothorax  -patient had bilateral chest tubes placed; subsequently transitioned to pneumo stat tubes  -states his dyspnea has been stable since then  -per nurse, site of chest tubes placement has been clean, no erythema, no drainage    -will continue to clinically monitor

## 2021-01-25 NOTE — ASSESSMENT & PLAN NOTE
-recently diagnosed  -has received remdesivir and Decadron  -currently on room air; denying any worsening of dyspnea    -will continue to monitor clinically  -continuing Tessalon for cough

## 2021-01-25 NOTE — PROGRESS NOTES
[facility]  [address] [place] [address], [address] [place] [address]    Nursing Home Admission    NAME: Madelin Solis  AGE: 77 y o  SEX: male [id number]      Patient Location     [address] [place] [address] care was coordinated with nursing facility staff  Recent vitals, labs and updated medications were reviewed on [facility]  Past Medical, surgical, social, medication and allergy history and patients previous records reviewed         Assessment/Plan:    Bilateral pneumothoraces  -present on admission  -per chart review, CT showed subpleural cysts with largest cyst containing air-fluid level in the superior segment of right lower lobe which could be the cause of her pneumothorax  -patient had bilateral chest tubes placed; subsequently transitioned to pneumo stat tubes  -states his dyspnea has been stable since then  -per nurse, site of chest tubes placement has been clean, no erythema, no drainage    -will continue to clinically monitor    Pneumonia due to COVID-19 virus  -recently diagnosed  -has received remdesivir and Decadron  -currently on room air; denying any worsening of dyspnea    -will continue to monitor clinically  -continuing Tessalon for cough    Respiratory failure ([facility] [address] )  -documented in previous admission; due to COVID-19 pneumonia and bilateral pneumothorax  -has since improved and appears to have resolved    -will continue to monitor    Benign hypertension with chronic kidney disease, stage III  Lab Results   Component Value Date    EGFR 36 08/05/2019    CREATININE 1 50 (H) 12/09/2020    CREATININE 1 55 (H) 06/01/2020    CREATININE 1 68 (H) 11/11/2019     -present on admission  -continuing lisinopril 5 mg OD, continuing potassium chloride 10 mEq OD    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  -known diagnosis  -continuing on allopurinol 300 mg OD    CKD (chronic kidney disease)  Lab Results   Component Value Date EGFR 36 08/05/2019    CREATININE 1 50 (H) 12/09/2020    CREATININE 1 55 (H) 06/01/2020    CREATININE 1 68 (H) 11/11/2019     -see assessment and plan of benign hypertension with CKD stage 3    Urinary retention  -per chart review, documented in recent admission  -patient placed on Nolasco's    -will continue to clinically monitor  -continuing tamsulosin 0 4 mg OD          Chief Complaint      Admission; short-term rehab; COVID-19      HPI     76 y/o M PMH HTN, CKD, recent Covid positive presents with worsening dyspnea  Patient states since discharge he has been doing well but today woke up with significant trouble breathing  Called EMS  On arrival to the ED he was speaking 1 word phrases, in respiratory distress  Placed on BiPap and given duoneb, decadron, epi, mag and zofran  CXR showed bilat pneumothorax, he had bilat chest pigtails placed  His respiratory issues improved after this  He has not had this happen before, does not believe this has happened to anyone in his family either      He was recent admitted on the 12/28-1/1 for covid pneumonia, discharged with home O2 for 2L but he states he just used it randomly as he felt pretty good after the left the hospital  He did finish a course of decadron and remdesivir  CT showed nunu organizing pna from covid with subpleural cysts and large cyst containing air fluid level superior seg RLL 5 4/4 4 cm could be cause of pneumothorax  He was eventually transitioned to pneumostat tubes with the help of cardio thoracic surgery  They have cleared him for follow up in 3 weeks as outpatient    Urology had seen the patient for urinary retention  He failed voiding trial and hence will be discharged with foleys catheter  He had mild tracey upon admission which has resolved now    ENT had evaluated the patient for dysphonia felt to be mild vocal cord paralysis  OT recommended outpatient speech, voice and cognitive rx   They also evaluated for mild dysphagia as well      Patient was seen and examined by me at bedside  Communicated clearly  No particular overnight event reported  Hemodynamically stable and afebrile  Patient states he still has some chest pain at the site of chest tube insertion  States his dyspnea has been stable  Does have an intermittent cough  Past Medical History:   Diagnosis Date    Benign hypertension with chronic kidney disease, stage III ([facility] ) 8/22/2016    Colon polyps     Degenerative arthritis of hip 3/2/2015    Gout     Impacted cerumen of both ears     last assessed-2/9/2015    Renal calculi     last assessed-9/22/2014    Urinary problem     last assessed-9/22/2014    Uveitis     last assessed-2/22/2016       Past Surgical History:   Procedure Laterality Date    CATARACT EXTRACTION Right     [place] COLONOSCOPY FLX DX W/COLLJ SPEC WHEN PFRMD N/A 10/8/2018    Procedure: COLONOSCOPY;  Surgeon: Charmaine Jackman MD;  [facility]  GI LAB; Service: Gastroenterology    TONSILLECTOMY      last assessed-9/22/2014       Social History     Tobacco Use   Smoking Status Current Some Day Smoker    Types: Pipe   Smokeless Tobacco Never Used   Tobacco Comment    smokes a pipe 3 times a week          Family History   Problem Relation Age of Onset    Breast cancer Mother     Coronary artery disease Father         No Known Allergies       Current Outpatient Medications:     allopurinol (ZYLOPRIM) 300 mg tablet, TAKE 1 TABLET BY MOUTH EVERY DAY, Disp: 90 tablet, Rfl: 1    lisinopril (ZESTRIL) 5 mg tablet, Take 1 tablet (5 mg total) by mouth every other day, Disp: 90 tablet, Rfl: 1    potassium chloride (Klor-Con M10) 10 mEq tablet, Take 1 tablet (10 mEq total) by mouth daily, Disp: 90 tablet, Rfl: 1    tamsulosin (FLOMAX) 0 4 mg, Take 0 4 mg by mouth daily at bedtime  , Disp: , Rfl:     torsemide (DEMADEX) 20 mg tablet, Take 2 tablets (40 mg total) by mouth daily (Patient taking differently: Take 20 mg by mouth daily ), Disp: 60 tablet, Rfl: 5   traMADol-acetaminophen (ULTRACET) 37 5-325 mg per tablet, Take 2 tablets by mouth every 6 (six) hours as needed for moderate pain, Disp: 100 tablet, Rfl: 0    Updated list was reviewed in [facility] system of facility  Vitals:    01/25/21 1530   BP: 130/78   Pulse: 80   Resp: 22   Temp: 97 9 °F (36 6 °C)       Review of Systems   Constitutional: Negative for chills and fever  HENT: Negative for congestion, sinus pain and sore throat  Eyes: Negative for pain and visual disturbance  Respiratory: Positive for cough and shortness of breath  Negative for chest tightness  Cardiovascular: Positive for chest pain (Near sites of chest tube placement)  Negative for palpitations  Gastrointestinal: Negative for abdominal pain, constipation, diarrhea, nausea and vomiting  Genitourinary: Nolasco's in place   Musculoskeletal: Negative for arthralgias, back pain and myalgias  Skin: Negative for rash and wound  Neurological: Negative for dizziness, numbness and headaches  Psychiatric/Behavioral: Negative for agitation, behavioral problems and confusion  Physical Exam  Constitutional:       General: He is not in acute distress  Appearance: He is obese  He is not ill-appearing  HENT:      Head: Normocephalic and atraumatic  Eyes:      General: No scleral icterus  Right eye: No discharge  Left eye: No discharge  Extraocular Movements: Extraocular movements intact  Neck:      Musculoskeletal: Normal range of motion  Cardiovascular:      Heart sounds: No murmur  Pulmonary:      Effort: Pulmonary effort is normal  No respiratory distress  Breath sounds: No wheezing  Comments: Chest tubes placed; states clear, no erythema, no drainage  Abdominal:      General: There is no distension  Palpations: Abdomen is soft  Genitourinary:     Comments: Nolasco's in place  Musculoskeletal: Normal range of motion           General: No swelling, tenderness or signs of injury  Comments: 1+ left lower extremity distal edema   Skin:     General: Skin is warm and dry  Neurological:      Mental Status: He is alert and oriented to person, place, and time  Psychiatric:         Mood and Affect: Mood normal          Behavior: Behavior normal            Diagnostic Data       Recent labs were reviewed        Additional notes:

## 2021-01-25 NOTE — ASSESSMENT & PLAN NOTE
Lab Results   Component Value Date    EGFR 36 08/05/2019    CREATININE 1 50 (H) 12/09/2020    CREATININE 1 55 (H) 06/01/2020    CREATININE 1 68 (H) 11/11/2019     -see assessment and plan of benign hypertension with CKD stage 3

## 2021-01-26 ENCOUNTER — TRANSITIONAL CARE MANAGEMENT (OUTPATIENT)
Dept: INTERNAL MEDICINE CLINIC | Age: 76
End: 2021-01-26

## 2021-02-03 ENCOUNTER — NURSING HOME VISIT (OUTPATIENT)
Dept: WOUND CARE | Facility: HOSPITAL | Age: 76
End: 2021-02-03
Payer: MEDICARE

## 2021-02-03 DIAGNOSIS — U07.1 PNEUMONIA DUE TO COVID-19 VIRUS: ICD-10-CM

## 2021-02-03 DIAGNOSIS — L89.153 PRESSURE INJURY OF SACRAL REGION, STAGE 3 (HCC): Primary | ICD-10-CM

## 2021-02-03 DIAGNOSIS — J12.82 PNEUMONIA DUE TO COVID-19 VIRUS: ICD-10-CM

## 2021-02-03 PROCEDURE — 99305 1ST NF CARE MODERATE MDM 35: CPT | Performed by: NURSE PRACTITIONER

## 2021-02-03 NOTE — PROGRESS NOTES
[address] MANAGEMENT   [facility]       Patient ID: Marlon Rosas is a 77 y o  male Date of Birth 1945     Location of Service: [facility]    Performed wound round with: Nurse Manager, BRANDI ROLLINS      Chief Complaint   Patient presents with   [address] [facility] [address] [facility] Visit     Wound on the sacrum       Wound Instructions:  Orders Placed This Encounter   Procedures    Wound cleansing and dressings     Wound:  Sacrum  Discontinue previous wound order  Cleanse the sacrum with soap and water, pat dry  Apply triad to wound bed  Frequency : daily and prn for soiling  Offload all wounds  Turn and reposition frequently, maximum of every two hours  Increase protein intake   Monitor for any sign of infection or worsening, inform PCP or patient's primary physician in your facility immediately  Standing Status:   Future     Standing Expiration Date:   2/3/2022       Allergies  Patient has no known allergies  Assessment & Plan:  1  Pressure injury of sacral region, stage 3 ([facility])  Assessment & Plan:  Sacrum  - 100% granulation  - triad daily for local wound care  - follow up next week    Orders:  -     Wound cleansing and dressings; Future    2  Pneumonia due to COVID-19 virus  Assessment & Plan:  - with chest tube, intact  - manage by Dr Breanna Matos: This is a new referral for wound on the sacrum  Patient was recently hospitalized for pneumonia due to COVID-19  Patient was discharged at [facility] for short term rehab  Patient is Alert and oriented but was not aware that he have a wound on his sacrum    Wound no  1 - Location sacrum, Onset 1/27/2021, prior to admission to [facility], Etiology pressure ulcer, Severity no sign of infection, current treatment none        Patient's care was coordinated with nursing facility staff  Recent vitals, labs and updated medications were reviewed on EMR or chart system of facility   Past Medical, surgical, social, medication and allergy history and patient's previous records were reviewed and updated as appropriate:     Patient Active Problem List   Diagnosis    Benign hypertension with chronic kidney disease, stage III    Degenerative arthritis of hip    Elevated PSA    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    Nocturia    Osteoarthritis    Peripheral edema    Medicare annual wellness visit, subsequent    Other atopic dermatitis    Elevated fasting glucose    Pneumonia due to COVID-19 virus    Bilateral pneumothoraces    CKD (chronic kidney disease)    Respiratory failure (HCC)    Urinary retention    Pressure injury of sacral region, stage 3 (HCC)     Past Medical History:   Diagnosis Date    Benign hypertension with chronic kidney disease, stage III 8/22/2016    Colon polyps     Degenerative arthritis of hip 3/2/2015    Gout     Impacted cerumen of both ears     last assessed-2/9/2015    Renal calculi     last assessed-9/22/2014    Urinary problem     last assessed-9/22/2014    Uveitis     last assessed-2/22/2016     Past Surgical History:   Procedure Laterality Date    CATARACT EXTRACTION Right     DC COLONOSCOPY FLX DX W/COLLJ SPEC WHEN PFRMD N/A 10/8/2018    Procedure: COLONOSCOPY;  Surgeon: Niranjan Florian MD;  [facility];   Service: Gastroenterology    TONSILLECTOMY      last assessed-9/22/2014     Social History     Socioeconomic History    Marital status: Significant Other     Spouse name: None    Number of children: None    Years of education: None    Highest education level: None   Occupational History    None   Social Needs    Financial resource strain: None    Food insecurity     Worry: None     Inability: None    Transportation needs     Medical: None     Non-medical: None   Tobacco Use    Smoking status: Current Some Day Smoker     Types: Pipe    Smokeless tobacco: Never Used    Tobacco comment: smokes a pipe 3 times a week   Substance and Sexual Activity    Alcohol use: No    Drug use: No    Sexual activity: None   Lifestyle    Physical activity     Days per week: None     Minutes per session: None    Stress: None   Relationships    Social connections     Talks on phone: None     Gets together: None     Attends Oriental orthodox service: None     Active member of club or organization: None     Attends meetings of clubs or organizations: None     Relationship status: None    Intimate partner violence     Fear of current or ex partner: None     Emotionally abused: None     Physically abused: None     Forced sexual activity: None   Other Topics Concern    None   Social History Narrative    None        Current Outpatient Medications:     allopurinol (ZYLOPRIM) 300 mg tablet, TAKE 1 TABLET BY MOUTH EVERY DAY, Disp: 90 tablet, Rfl: 1    lisinopril (ZESTRIL) 5 mg tablet, Take 1 tablet (5 mg total) by mouth every other day, Disp: 90 tablet, Rfl: 1    potassium chloride (Klor-Con M10) 10 mEq tablet, Take 1 tablet (10 mEq total) by mouth daily, Disp: 90 tablet, Rfl: 1    tamsulosin (FLOMAX) 0 4 mg, Take 0 4 mg by mouth daily at bedtime  , Disp: , Rfl:     torsemide (DEMADEX) 20 mg tablet, Take 2 tablets (40 mg total) by mouth daily (Patient taking differently: Take 20 mg by mouth daily ), Disp: 60 tablet, Rfl: 5    traMADol-acetaminophen (ULTRACET) 37 5-325 mg per tablet, Take 2 tablets by mouth every 6 (six) hours as needed for moderate pain, Disp: 100 tablet, Rfl: 0  Family History   Problem Relation Age of Onset    Breast cancer Mother     Coronary artery disease Father         Review of Systems   Constitutional: Negative for appetite change  HENT: Negative for mouth sores, sore throat and trouble swallowing  Eyes: Negative for pain, discharge, itching and visual disturbance  Respiratory: Negative for cough, chest tightness and shortness of breath  With pneumovax tube   Cardiovascular: Negative for chest pain and leg swelling  Gastrointestinal: Negative for abdominal distention, abdominal pain, constipation, diarrhea and nausea  Endocrine: Negative for polydipsia, polyphagia and polyuria  Genitourinary: Negative for dysuria and flank pain  Musculoskeletal: Positive for gait problem  Skin: Positive for wound  Allergic/Immunologic: Negative for environmental allergies  Neurological: Negative for dizziness, seizures and headaches  Psychiatric/Behavioral: Negative for behavioral problems and confusion  The patient is not nervous/anxious  Objective: There were no vitals taken for this visit  Physical Exam  HENT:      Head: Normocephalic  Eyes:      General:         Right eye: No discharge  Left eye: No discharge  Neck:      Musculoskeletal: Normal range of motion  Cardiovascular:      Pulses: Normal pulses  Pulmonary:      Effort: Pulmonary effort is normal       Comments: With pneumovax, intact  Abdominal:      Palpations: Abdomen is soft  Tenderness: There is no abdominal tenderness  Musculoskeletal:      Right lower leg: No edema  Left lower leg: No edema  Comments: LROM   Skin:     General: Skin is dry  Findings: Lesion present  Comments: Location sacrum wound bed - 1 5 x 1 0 x  1 cm , no undermining, no tunneling, 0 % epithelial, 100%granulation, 0%slough, exudate - no drainage, no malodor ( assess after dressing removal and cleansing), wound edge - attached to base, periwound - intact  No localized sign of infection, Denies pain     Neurological:      Mental Status: He is alert and oriented to person, place, and time  Gait: Gait abnormal    Psychiatric:         Mood and Affect: Mood normal          Behavior: Behavior normal               Procedures             Coordination of Care: DON aware of the treatment plan      Follow up :  Return in about 1 week (around 2/10/2021)        CHANDAN Caraballo

## 2021-02-03 NOTE — PATIENT INSTRUCTIONS
Orders Placed This Encounter   Procedures    Wound cleansing and dressings     Wound:  Sacrum  Discontinue previous wound order  Cleanse the sacrum with soap and water, pat dry  Apply triad to wound bed  Frequency : daily and prn for soiling  Offload all wounds  Turn and reposition frequently, maximum of every two hours  Increase protein intake   Monitor for any sign of infection or worsening, inform PCP or patient's primary physician in your facility immediately       Standing Status:   Future     Standing Expiration Date:   2/3/2022

## 2021-02-05 ENCOUNTER — TELEPHONE (OUTPATIENT)
Dept: INTERNAL MEDICINE CLINIC | Facility: CLINIC | Age: 76
End: 2021-02-05

## 2021-02-05 NOTE — TELEPHONE ENCOUNTER
Spoke to wife  Pt is in [facility] due to [facility]ac  Pt was hospitalized, they discharged him, and then he collapsed at home and wife has to call 911 because pt couldn't breathe  I informed her I called to do a welfare check and let them know you were asking about him and wishing him well  Instructed her to call if they need anything

## 2021-02-15 ENCOUNTER — NURSING HOME VISIT (OUTPATIENT)
Dept: GERIATRICS | Facility: OTHER | Age: 76
End: 2021-02-15
Payer: MEDICARE

## 2021-02-15 DIAGNOSIS — M1A.3710 CHRONIC GOUT DUE TO RENAL IMPAIRMENT INVOLVING TOE OF RIGHT FOOT WITHOUT TOPHUS: ICD-10-CM

## 2021-02-15 DIAGNOSIS — M25.521 RIGHT ELBOW PAIN: Primary | ICD-10-CM

## 2021-02-15 DIAGNOSIS — U07.1 PNEUMONIA DUE TO COVID-19 VIRUS: ICD-10-CM

## 2021-02-15 DIAGNOSIS — N18.32 STAGE 3B CHRONIC KIDNEY DISEASE (HCC): ICD-10-CM

## 2021-02-15 DIAGNOSIS — J93.9 BILATERAL PNEUMOTHORACES: ICD-10-CM

## 2021-02-15 DIAGNOSIS — J96.90 RESPIRATORY FAILURE, UNSPECIFIED CHRONICITY, UNSPECIFIED WHETHER WITH HYPOXIA OR HYPERCAPNIA (HCC): ICD-10-CM

## 2021-02-15 DIAGNOSIS — I12.9 BENIGN HYPERTENSION WITH CHRONIC KIDNEY DISEASE, STAGE III (HCC): ICD-10-CM

## 2021-02-15 DIAGNOSIS — N18.30 BENIGN HYPERTENSION WITH CHRONIC KIDNEY DISEASE, STAGE III (HCC): ICD-10-CM

## 2021-02-15 DIAGNOSIS — J12.82 PNEUMONIA DUE TO COVID-19 VIRUS: ICD-10-CM

## 2021-02-15 DIAGNOSIS — R33.9 URINARY RETENTION: ICD-10-CM

## 2021-02-15 DIAGNOSIS — L89.153 PRESSURE INJURY OF SACRAL REGION, STAGE 3 (HCC): ICD-10-CM

## 2021-02-15 PROCEDURE — 99309 SBSQ NF CARE MODERATE MDM 30: CPT | Performed by: INTERNAL MEDICINE

## 2021-02-15 NOTE — ASSESSMENT & PLAN NOTE
Lab Results   Component Value Date    EGFR 36 08/05/2019    CREATININE 1 50 (H) 12/09/2020    CREATININE 1 55 (H) 06/01/2020    CREATININE 1 68 (H) 11/11/2019   Blood pressure is under control  Continue lisinopril 5 mg p o  q d    Continue monitor blood pressure

## 2021-02-15 NOTE — ASSESSMENT & PLAN NOTE
Patient denies shortness of breath on room air  Has completed treatment  With right chest tube  Chest x-ray a week ago showed no pneumothorax  Continue monitor respiratory status

## 2021-02-15 NOTE — ASSESSMENT & PLAN NOTE
Patient started to have right elbow pain and swelling on last Saturday  On examination right elbow bowel erythema and tenderness  Is likely due to gout versus on likely bursitis  Will check inflammatory markers  Prednisone 20 mg p o  b i d  for 3 days  Follow-up patient is 3 days

## 2021-02-15 NOTE — ASSESSMENT & PLAN NOTE
Right chest tube present with small amount of yellow drainage  Patient denies chest pain, fever, and chills  Recent x-ray showed no evidence of pneumothorax  Continue to monitor

## 2021-02-15 NOTE — PROGRESS NOTES
[facility]  [address]   [address] [place], [place], [address]  50     Progress Note  Code [id number]    Patient Location     Lorena Mcqueen    Reason for visit     Follow-up medical problems, right elbow pain and swollen    Patients care was coordinated with nursing facility staff  Recent vitals, labs and updated medications were reviewed on [employer] system of facility  Past Medical, surgical, social, medication and allergy history and patients previous records reviewed  Problem List Items Addressed This Visit        Respiratory    Pneumonia due to COVID-19 virus     Patient denies shortness of breath on room air  Has completed treatment  With right chest tube  Chest x-ray a week ago showed no pneumothorax  Continue monitor respiratory status         Bilateral pneumothoraces     Right chest tube present with small amount of yellow drainage  Patient denies chest pain, fever, and chills  Recent x-ray showed no evidence of pneumothorax  Continue to monitor         Respiratory failure [facility])     Patient denies shortness of breath on room air  Currently stable            Cardiovascular and Mediastinum    Benign hypertension with chronic kidney disease, stage III     Lab Results   Component Value Date    EGFR 36 08/05/2019    CREATININE 1 50 (H) 12/09/2020    CREATININE 1 55 (H) 06/01/2020    CREATININE 1 68 (H) 11/11/2019   Blood pressure is under control  Continue lisinopril 5 mg p o  q d  Continue monitor blood pressure            Musculoskeletal and Integument    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     History of gout  Continue allopurinol 300 mg q d             Pressure injury of sacral region, stage 3 (HCC)     Current is taking care by wound care            Genitourinary    CKD (chronic kidney disease)     Lab Results   Component Value Date    EGFR 36 08/05/2019    CREATININE 1 50 (H) 12/09/2020    CREATININE 1 55 (H) 06/01/2020    CREATININE 1 68 (H) 11/11/2019   Stable   Avoid nephrotoxins and NSAIDs         Urinary retention     Continue tamsulosin 0 4 mg q d  Other    Right elbow pain - Primary     Patient started to have right elbow pain and swelling on last Saturday  On examination right elbow bowel erythema and tenderness  Is likely due to gout versus on likely bursitis  Will check inflammatory markers  Prednisone 20 mg p o  b i d  for 3 days  Follow-up patient is 3 days                   HPI     78 y/o M PMH HTN, CKD, recent Covid positive presents with worsening dyspnea  Patient states since discharge he has been doing well but today woke up with significant trouble breathing  Called EMS  On arrival to the ED he was speaking 1 word phrases, in respiratory distress  Placed on BiPap and given duoneb, decadron, epi, mag and zofran  CXR showed bilat pneumothorax, he had bilat chest pigtails placed  His respiratory issues improved after this  He has not had this happen before, does not believe this has happened to anyone in his family either      He was recent admitted on the 12/28-1/1 for covid pneumonia, discharged with home O2 for 2L but he states he just used it randomly as he felt pretty good after the left the hospital  He did finish a course of decadron and remdesivir    At the time of my evaluation, patient complained of right elbow pain and swollen  On examination, the is erythema on right elbow and tenderness, swelling has decreased  Patient has a history of gout  Patient denies fever, chills,   Shortness of breath,  chest pain, abdominal pain, dysuria or bowel movement changes  Review of Systems   Constitutional: Negative for chills and fever  HENT: Negative for congestion, rhinorrhea, sneezing and sore throat  Eyes: Negative for pain and discharge  Respiratory: Negative for cough, chest tightness, shortness of breath and wheezing      Cardiovascular: Negative for chest pain and leg swelling  Gastrointestinal: Negative for abdominal pain, nausea and vomiting  Endocrine: Negative for polydipsia, polyphagia and polyuria  Genitourinary: Negative for flank pain, frequency and urgency  Musculoskeletal: Positive for joint swelling  Negative for arthralgias and back pain  Skin: Negative for color change and pallor  Neurological: Negative for dizziness, weakness, light-headedness and headaches  Psychiatric/Behavioral: Negative for agitation and confusion  Past Medical History:   Diagnosis Date    Benign hypertension with chronic kidney disease, stage III 8/22/2016    Colon polyps     Degenerative arthritis of hip 3/2/2015    Gout     Impacted cerumen of both ears     last assessed-2/9/2015    Renal calculi     last assessed-9/22/2014    Urinary problem     last assessed-9/22/2014    Uveitis     last assessed-2/22/2016       Past Surgical History:   Procedure Laterality Date    CATARACT EXTRACTION Right     MA COLONOSCOPY FLX DX W/COLLJ SPEC WHEN PFRMD N/A 10/8/2018    Procedure: COLONOSCOPY;  Surgeon: Ye Cardenas MD;  [facility]; Service: Gastroenterology    TONSILLECTOMY      last assessed-9/22/2014       Social History     Tobacco Use   Smoking Status Current Some Day Smoker    Types: Pipe   Smokeless Tobacco Never Used   Tobacco Comment    smokes a pipe 3 times a week       Family History   Problem Relation Age of Onset    Breast cancer Mother     Coronary artery disease Father         No Known Allergies      Current Outpatient Medications:     allopurinol (ZYLOPRIM) 300 mg tablet, TAKE 1 TABLET BY MOUTH EVERY DAY, Disp: 90 tablet, Rfl: 1    lisinopril (ZESTRIL) 5 mg tablet, Take 1 tablet (5 mg total) by mouth every other day, Disp: 90 tablet, Rfl: 1    potassium chloride (Klor-Con M10) 10 mEq tablet, Take 1 tablet (10 mEq total) by mouth daily, Disp: 90 tablet, Rfl: 1    tamsulosin (FLOMAX) 0 4 mg, Take 0 4 mg by mouth daily at bedtime  , Disp: , Rfl:     torsemide (DEMADEX) 20 mg tablet, Take 2 tablets (40 mg total) by mouth daily (Patient taking differently: Take 20 mg by mouth daily ), Disp: 60 tablet, Rfl: 5    traMADol-acetaminophen (ULTRACET) 37 5-325 mg per tablet, Take 2 tablets by mouth every 6 (six) hours as needed for moderate pain, Disp: 100 tablet, Rfl: 0    Updated list was reviewed in [facility] of facility  /60, temperature 97 7°, pulse 76, respirations 20, SpO2 95%    Physical Exam  Constitutional:       Appearance: He is obese  HENT:      Head: Normocephalic  Eyes:      Pupils: Pupils are equal, round, and reactive to light  Neck:      Musculoskeletal: Normal range of motion  Cardiovascular:      Rate and Rhythm: Normal rate and regular rhythm  Heart sounds: No murmur  Pulmonary:      Effort: Pulmonary effort is normal  No respiratory distress  Breath sounds: No wheezing or rales  Comments: Right chest tube in place  Abdominal:      Palpations: Abdomen is soft  Tenderness: There is no abdominal tenderness  Musculoskeletal: Normal range of motion  General: Swelling (Right elbow) and tenderness present  Skin:     General: Skin is warm  Findings: Erythema (Right elbow) present  Neurological:      Mental Status: He is alert and oriented to person, place, and time  Psychiatric:         Mood and Affect: Mood normal          Diagnostic Data:    Recent labs were reviewed  Chest x-ray (2/8/2021): There are prominent bilateral interstitial lung markings  Additional Notes:      This note was electronically signed by Dr Jo-Ann Leiva

## 2021-02-15 NOTE — ASSESSMENT & PLAN NOTE
Lab Results   Component Value Date    EGFR 36 08/05/2019    CREATININE 1 50 (H) 12/09/2020    CREATININE 1 55 (H) 06/01/2020    CREATININE 1 68 (H) 11/11/2019   Stable   Avoid nephrotoxins and NSAIDs

## 2021-02-19 ENCOUNTER — NURSING HOME VISIT (OUTPATIENT)
Dept: GERIATRICS | Facility: OTHER | Age: 76
End: 2021-02-19
Payer: MEDICARE

## 2021-02-19 VITALS
OXYGEN SATURATION: 94 % | WEIGHT: 177 LBS | SYSTOLIC BLOOD PRESSURE: 132 MMHG | RESPIRATION RATE: 18 BRPM | DIASTOLIC BLOOD PRESSURE: 41 MMHG | HEART RATE: 76 BPM | BODY MASS INDEX: 27.95 KG/M2

## 2021-02-19 DIAGNOSIS — J93.9 BILATERAL PNEUMOTHORACES: ICD-10-CM

## 2021-02-19 DIAGNOSIS — M16.12 PRIMARY OSTEOARTHRITIS OF LEFT HIP: ICD-10-CM

## 2021-02-19 DIAGNOSIS — M25.521 RIGHT ELBOW PAIN: Primary | ICD-10-CM

## 2021-02-19 DIAGNOSIS — N18.30 BENIGN HYPERTENSION WITH CHRONIC KIDNEY DISEASE, STAGE III (HCC): ICD-10-CM

## 2021-02-19 DIAGNOSIS — U07.1 PNEUMONIA DUE TO COVID-19 VIRUS: ICD-10-CM

## 2021-02-19 DIAGNOSIS — J12.82 PNEUMONIA DUE TO COVID-19 VIRUS: ICD-10-CM

## 2021-02-19 DIAGNOSIS — I12.9 BENIGN HYPERTENSION WITH CHRONIC KIDNEY DISEASE, STAGE III (HCC): ICD-10-CM

## 2021-02-19 DIAGNOSIS — R33.9 URINARY RETENTION: ICD-10-CM

## 2021-02-19 PROCEDURE — 99308 SBSQ NF CARE LOW MDM 20: CPT | Performed by: INTERNAL MEDICINE

## 2021-02-19 NOTE — ASSESSMENT & PLAN NOTE
Lab Results   Component Value Date    EGFR 36 08/05/2019    CREATININE 1 50 (H) 12/09/2020    CREATININE 1 55 (H) 06/01/2020    CREATININE 1 68 (H) 11/11/2019    baseline creatinine appears to be around 1 5  Patient had BMP done today  Follow result    Avoid hypotension and nephrotoxins

## 2021-02-19 NOTE — ASSESSMENT & PLAN NOTE
Patient recently complained of right elbow pain  He was noted to have mild erythema and swelling involving right elbow few days ago  Patient was started on prednisone 20 mg b i d  considering history of gout  Overall doing better  Erythema and swelling has improved  Follow-up labs  Check uric acid level per PCP  Patient remains on allopurinol 300 mg daily

## 2021-02-19 NOTE — ASSESSMENT & PLAN NOTE
Lab Results   Component Value Date    EGFR 36 08/05/2019    CREATININE 1 50 (H) 12/09/2020    CREATININE 1 55 (H) 06/01/2020    CREATININE 1 68 (H) 11/11/2019    blood pressure stable at 132 over 76   Continue lisinopril 5 mg daily

## 2021-02-19 NOTE — ASSESSMENT & PLAN NOTE
Patient was recently evaluated by Cardiothoracic Service and chest tube  Was removed  Follow-up notes reviewed  Recent chest x-ray was negative for pneumothorax

## 2021-02-19 NOTE — PROGRESS NOTES
[facility]  [address]   [address] [facility]  50     Progress Note  Code  [facility] 31    Patient Location      [address] [place] [address] rehab    Reason for visit      follow-up right elbow pain, history of gout, history of pneumothorax, hypertension, BPH, deconditioning    Patients care was coordinated with nursing facility staff  Recent vitals, labs and updated medications were reviewed on [employer] system of facility  Past Medical, surgical, social, medication and allergy history and patients previous records reviewed  Problem List Items Addressed This Visit        Respiratory    Pneumonia due to COVID-19 virus      Completed treatment  Afebrile with stable respiratory status         Bilateral pneumothoraces     Patient was recently evaluated by Cardiothoracic Service and chest tube  Was removed  Follow-up notes reviewed  Recent chest x-ray was negative for pneumothorax  Cardiovascular and Mediastinum    Benign hypertension with chronic kidney disease, stage III     Lab Results   Component Value Date    EGFR 36 08/05/2019    CREATININE 1 50 (H) 12/09/2020    CREATININE 1 55 (H) 06/01/2020    CREATININE 1 68 (H) 11/11/2019    blood pressure stable at 132 over 76  Continue lisinopril 5 mg daily            Musculoskeletal and Integument    Osteoarthritis      Stable no acute pain issues at this time  Continue p r n  Tylenol            Genitourinary    Urinary retention      Continue tamsulosin 0 4 mg daily            Other    Right elbow pain - Primary      Patient recently complained of right elbow pain  He was noted to have mild erythema and swelling involving right elbow few days ago  Patient was started on prednisone 20 mg b i d  considering history of gout  Overall doing better  Erythema and swelling has improved  Follow-up labs  Check uric acid level per PCP  Patient remains on allopurinol 300 mg daily                     HPI patient is being seen for a follow-up visit today  He is doing okay at present  Patient was noted to have right elbow pain with mild erythema and swelling few days ago  He was started on prednisone 20 mg b i d  with favorable response  patient was additionally evaluated by Cardiothoracic surgery for a follow-up visit today  Notes reviewed  Pt has not had any fever chills or chest congestion      Review of Systems   Constitutional: Positive for fatigue  Negative for chills and fever  HENT: Negative for nosebleeds and rhinorrhea  Eyes: Negative for discharge and redness  Respiratory: Negative for cough, chest tightness, shortness of breath, wheezing and stridor  Cardiovascular: Negative for chest pain and leg swelling  Gastrointestinal: Negative for abdominal distention, abdominal pain, diarrhea and vomiting  Genitourinary: Negative for dysuria, flank pain and hematuria  Musculoskeletal: Positive for gait problem  Negative for arthralgias and back pain  Skin: Negative for pallor  Neurological: Positive for weakness (Generalized)  Negative for tremors, seizures, syncope and headaches  Psychiatric/Behavioral: Negative for agitation, behavioral problems and confusion  Past Medical History:   Diagnosis Date    Benign hypertension with chronic kidney disease, stage III 8/22/2016    Colon polyps     Degenerative arthritis of hip 3/2/2015    Gout     Impacted cerumen of both ears     last assessed-2/9/2015    Renal calculi     last assessed-9/22/2014    Urinary problem     last assessed-9/22/2014    Uveitis     last assessed-2/22/2016       Past Surgical History:   Procedure Laterality Date    CATARACT EXTRACTION Right     SD COLONOSCOPY FLX DX W/COLLJ SPEC WHEN PFRMD N/A 10/8/2018    Procedure: COLONOSCOPY;  Surgeon: Ada Riley MD;  [facility];   Service: Gastroenterology    TONSILLECTOMY      last assessed-9/22/2014       Social History     Tobacco Use   Smoking Status Current Some Day Smoker    Types: Pipe   Smokeless Tobacco Never Used   Tobacco Comment    smokes a pipe 3 times a week       Family History   Problem Relation Age of Onset    Breast cancer Mother     Coronary artery disease Father         No Known Allergies      Current Outpatient Medications:     allopurinol (ZYLOPRIM) 300 mg tablet, TAKE 1 TABLET BY MOUTH EVERY DAY, Disp: 90 tablet, Rfl: 1    lisinopril (ZESTRIL) 5 mg tablet, Take 1 tablet (5 mg total) by mouth every other day, Disp: 90 tablet, Rfl: 1    potassium chloride (Klor-Con M10) 10 mEq tablet, Take 1 tablet (10 mEq total) by mouth daily, Disp: 90 tablet, Rfl: 1    tamsulosin (FLOMAX) 0 4 mg, Take 0 4 mg by mouth daily at bedtime  , Disp: , Rfl:     torsemide (DEMADEX) 20 mg tablet, Take 2 tablets (40 mg total) by mouth daily (Patient taking differently: Take 20 mg by mouth daily ), Disp: 60 tablet, Rfl: 5    traMADol-acetaminophen (ULTRACET) 37 5-325 mg per tablet, Take 2 tablets by mouth every 6 (six) hours as needed for moderate pain, Disp: 100 tablet, Rfl: 0    [facility]  Vitals:    02/19/21 0953   BP: (!) 132/41   Pulse: 76   Resp: 18   SpO2: 94%       Physical Exam  Constitutional:       General: He is not in acute distress  Appearance: He is well-developed  He is not diaphoretic  HENT:      Head: Normocephalic and atraumatic  Nose: No rhinorrhea  Eyes:      General: No scleral icterus  Right eye: No discharge  Left eye: No discharge  Neck:      Musculoskeletal: Neck supple  Cardiovascular:      Rate and Rhythm: Normal rate  Pulmonary:      Breath sounds: No stridor  No wheezing  Abdominal:      General: There is no distension  Tenderness: There is no abdominal tenderness  Skin:     Coloration: Skin is not jaundiced or pale  Neurological:      General: No focal deficit present  Mental Status: He is alert  Cranial Nerves:  No cranial nerve deficit  Psychiatric:         Mood and Affect: Mood normal          Behavior: Behavior normal          Diagnostic Data:    Recent labs were reviewed    Lab Results   Component Value Date    SODIUM 139 12/09/2020    K 4 6 12/09/2020     12/09/2020    CO2 26 12/09/2020    BUN 22 12/09/2020    CREATININE 1 50 (H) 12/09/2020    GLUC 106 (H) 12/09/2020    CALCIUM 9 0 12/09/2020     Additional Notes:   F/U BMP from today    This note was electronically signed by Dr Julissa Paulino

## 2021-02-23 DIAGNOSIS — I12.9 BENIGN HYPERTENSION WITH CHRONIC KIDNEY DISEASE: ICD-10-CM

## 2021-02-23 RX ORDER — POTASSIUM CHLORIDE 750 MG/1
TABLET, EXTENDED RELEASE ORAL
Qty: 90 TABLET | Refills: 1 | Status: SHIPPED | OUTPATIENT
Start: 2021-02-23 | End: 2021-06-18 | Stop reason: SDUPTHER

## 2021-04-02 ENCOUNTER — OFFICE VISIT (OUTPATIENT)
Dept: INTERNAL MEDICINE CLINIC | Facility: CLINIC | Age: 76
End: 2021-04-02
Payer: MEDICARE

## 2021-04-02 VITALS
DIASTOLIC BLOOD PRESSURE: 60 MMHG | HEIGHT: 67 IN | TEMPERATURE: 98.5 F | OXYGEN SATURATION: 98 % | BODY MASS INDEX: 28.63 KG/M2 | SYSTOLIC BLOOD PRESSURE: 106 MMHG | HEART RATE: 105 BPM | WEIGHT: 182.4 LBS

## 2021-04-02 DIAGNOSIS — I12.9 BENIGN HYPERTENSION WITH CHRONIC KIDNEY DISEASE, STAGE III (HCC): Primary | ICD-10-CM

## 2021-04-02 DIAGNOSIS — I12.9 BENIGN HYPERTENSION WITH CHRONIC KIDNEY DISEASE: ICD-10-CM

## 2021-04-02 DIAGNOSIS — L89.153 PRESSURE INJURY OF SACRAL REGION, STAGE 3 (HCC): ICD-10-CM

## 2021-04-02 DIAGNOSIS — Z00.00 MEDICARE ANNUAL WELLNESS VISIT, SUBSEQUENT: ICD-10-CM

## 2021-04-02 DIAGNOSIS — R05.9 COUGH: ICD-10-CM

## 2021-04-02 DIAGNOSIS — N18.31 CHRONIC KIDNEY DISEASE (CKD) STAGE G3A/A1, MODERATELY DECREASED GLOMERULAR FILTRATION RATE (GFR) BETWEEN 45-59 ML/MIN/1.73 SQUARE METER AND ALBUMINURIA CREATININE RATIO LESS THAN 30 MG/G (HCC): ICD-10-CM

## 2021-04-02 DIAGNOSIS — N18.30 BENIGN HYPERTENSION WITH CHRONIC KIDNEY DISEASE, STAGE III (HCC): Primary | ICD-10-CM

## 2021-04-02 DIAGNOSIS — L20.89 OTHER ATOPIC DERMATITIS: ICD-10-CM

## 2021-04-02 PROCEDURE — 1123F ACP DISCUSS/DSCN MKR DOCD: CPT | Performed by: INTERNAL MEDICINE

## 2021-04-02 PROCEDURE — G0439 PPPS, SUBSEQ VISIT: HCPCS | Performed by: INTERNAL MEDICINE

## 2021-04-02 PROCEDURE — 99214 OFFICE O/P EST MOD 30 MIN: CPT | Performed by: INTERNAL MEDICINE

## 2021-04-02 RX ORDER — MOMETASONE FUROATE 1 MG/G
CREAM TOPICAL DAILY
Qty: 45 G | Refills: 0 | Status: SHIPPED | OUTPATIENT
Start: 2021-04-02 | End: 2022-08-02

## 2021-04-02 RX ORDER — GUAIFENESIN AND CODEINE PHOSPHATE 100; 10 MG/5ML; MG/5ML
5 SOLUTION ORAL 3 TIMES DAILY PRN
Qty: 236 ML | Refills: 1 | Status: SHIPPED | OUTPATIENT
Start: 2021-04-02 | End: 2021-04-26 | Stop reason: SDUPTHER

## 2021-04-02 RX ORDER — LISINOPRIL 5 MG/1
5 TABLET ORAL DAILY
Qty: 90 TABLET | Refills: 1
Start: 2021-04-02 | End: 2021-10-15 | Stop reason: SDUPTHER

## 2021-04-02 NOTE — LETTER
April 2, 2021     Patient: Mk Blum   YOB: 1945   Date of Visit: 4/2/2021       To Whom it May Concern:    Christel Razo is under my professional care  He was seen in my office on 4/2/2021  He may return to work on Wednesday, April 7, 2021  If you have any questions or concerns, please don't hesitate to call           Sincerely,          Ciaran Zeng MD        CC: No Recipients
last six months

## 2021-04-02 NOTE — PROGRESS NOTES
Assessment and Plan:     Problem List Items Addressed This Visit     None           Preventive health issues were discussed with patient, and age appropriate screening tests were ordered as noted in patient's After Visit Summary  Personalized health advice and appropriate referrals for health education or preventive services given if needed, as noted in patient's After Visit Summary  History of Present Illness:     Patient presents for Medicare Annual Wellness visit    Patient Care Team:  Xavier Martinez MD as PCP - General  MD Jim Asif MD Nickie Dunks, MD as Endoscopist     Problem List:     Patient Active Problem List   Diagnosis    Benign hypertension with chronic kidney disease, stage III    Degenerative arthritis of hip    Elevated PSA    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    Nocturia    Osteoarthritis    Peripheral edema    Medicare annual wellness visit, subsequent    Other atopic dermatitis    Elevated fasting glucose    Pneumonia due to COVID-19 virus    Bilateral pneumothoraces    CKD (chronic kidney disease)    Respiratory failure ([address] )    Urinary retention    Pressure injury of sacral region, stage 3 ([address] )    Right elbow pain      Past Medical and Surgical History:     Past Medical History:   Diagnosis Date    Benign hypertension with chronic kidney disease, stage III 8/22/2016    Colon polyps     Degenerative arthritis of hip 3/2/2015    Gout     Impacted cerumen of both ears     last assessed-2/9/2015    Renal calculi     last assessed-9/22/2014    Urinary problem     last assessed-9/22/2014    Uveitis     last assessed-2/22/2016     Past Surgical History:   Procedure Laterality Date    CATARACT EXTRACTION Right     VT COLONOSCOPY FLX DX W/COLLJ SPEC WHEN PFRMD N/A 10/8/2018    Procedure: COLONOSCOPY;  Surgeon: Pili Coyle MD;  [facility];   Service: Gastroenterology    TONSILLECTOMY      last assessed-2014      Family History:     Family History   Problem Relation Age of Onset   Devonte Gloria Breast cancer Mother     Coronary artery disease Father       Social History:     E-Cigarette/Vaping    E-Cigarette Use Never User      E-Cigarette/Vaping Substances    Nicotine No     THC No     CBD No     Flavoring No     Other No     Unknown No      Social History     Socioeconomic History    Marital status: Significant Other     Spouse name: None    Number of children: None    Years of education: None    Highest education level: None   Occupational History    None   Social Needs    Financial resource strain: None    Food insecurity     Worry: None     Inability: None    Transportation needs     Medical: None     Non-medical: None   Tobacco Use    Smoking status: Former Smoker     Types: Pipe     Quit date: 2021     Years since quittin 0    Smokeless tobacco: Never Used   Substance and Sexual Activity    Alcohol use: No    Drug use: No    Sexual activity: None   Lifestyle    Physical activity     Days per week: None     Minutes per session: None    Stress: None   Relationships    Social connections     Talks on phone: None     Gets together: None     Attends Orthodox service: None     Active member of club or organization: None     Attends meetings of clubs or organizations: None     Relationship status: None    Intimate partner violence     Fear of current or ex partner: None     Emotionally abused: None     Physically abused: None     Forced sexual activity: None   Other Topics Concern    None   Social History Narrative    None      Medications and Allergies:     Current Outpatient Medications   Medication Sig Dispense Refill    allopurinol (ZYLOPRIM) 300 mg tablet TAKE 1 TABLET BY MOUTH EVERY DAY 90 tablet 1    Klor-Con M10 10 MEQ tablet TAKE 1 TABLET BY MOUTH EVERY DAY 90 tablet 1    lisinopril (ZESTRIL) 5 mg tablet Take 1 tablet (5 mg total) by mouth every other day 90 tablet 1  tamsulosin (FLOMAX) 0 4 mg Take 0 4 mg by mouth daily at bedtime   torsemide (DEMADEX) 20 mg tablet Take 2 tablets (40 mg total) by mouth daily (Patient taking differently: Take 20 mg by mouth daily ) 60 tablet 5    traMADol-acetaminophen (ULTRACET) 37 5-325 mg per tablet Take 2 tablets by mouth every 6 (six) hours as needed for moderate pain 100 tablet 0     No current facility-administered medications for this visit  No Known Allergies   Immunizations:     Immunization History   Administered Date(s) Administered    INFLUENZA 10/01/2013, 09/16/2014, 10/07/2015, 09/28/2016, 09/20/2017, 09/22/2018, 09/09/2019    Influenza Split High Dose Preservative Free IM 09/16/2014, 09/22/2018, 09/09/2019    Influenza, high dose seasonal 0 7 mL 09/06/2020    Influenza, seasonal, injectable 10/16/2015    Pneumococcal Conjugate 13-Valent 07/15/2019    Pneumococcal Polysaccharide PPV23 09/22/2014    SARS-CoV-2 / COVID-19 mRNA IM ([employer]) 02/16/2021, 03/09/2021      Health Maintenance:         Topic Date Due    Colonoscopy Surveillance  10/08/2023    Colorectal Cancer Screening  10/08/2028    Hepatitis C Screening  Completed         Topic Date Due    DTaP,Tdap,and Td Vaccines (1 - Tdap) Never done      Medicare Health Risk Assessment:     There were no vitals taken for this visit  Ann Rea is here for his Subsequent Wellness visit  Last Medicare Wellness visit information reviewed, patient interviewed and updates made to the record today  Health Risk Assessment:   Patient rates overall health as very good  Patient feels that their physical health rating is much better  Patient is satisfied with their life  Eyesight was rated as same  Hearing was rated as same  Patient feels that their emotional and mental health rating is same  Patients states they are never, rarely angry  Patient states they are sometimes unusually tired/fatigued  Pain experienced in the last 7 days has been none   Patient states that he has experienced no weight loss or gain in last 6 months  Depression Screening:   PHQ-2 Score: 0      Fall Risk Screening: In the past year, patient has experienced: no history of falling in past year      Home Safety:  Patient does not have trouble with stairs inside or outside of their home  Patient has working smoke alarms and has working carbon monoxide detector  Home safety hazards include: none  Nutrition:   Current diet is Regular and Limited junk food  Medications:   Patient is not currently taking any over-the-counter supplements  Patient is able to manage medications  Activities of Daily Living (ADLs)/Instrumental Activities of Daily Living (IADLs):   Walk and transfer into and out of bed and chair?: Yes  Dress and groom yourself?: Yes    Bathe or shower yourself?: Yes    Feed yourself?  Yes  Do your laundry/housekeeping?: Yes  Manage your money, pay your bills and track your expenses?: Yes  Make your own meals?: Yes    Do your own shopping?: Yes    Previous Hospitalizations:   Any hospitalizations or ED visits within the last 12 months?: Yes    How many hospitalizations have you had in the last year?: 1-2    Hospitalization Comments: covid    Advance Care Planning:   Living will: Yes    Durable POA for healthcare: No    Advanced directive: Yes    Advanced directive counseling given: Yes    Five wishes given: No    Patient declined ACP directive: No    End of Life Decisions reviewed with patient: Yes    Provider agrees with end of life decisions: Yes      Cognitive Screening:   Provider or family/friend/caregiver concerned regarding cognition?: No    PREVENTIVE SCREENINGS      Cardiovascular Screening:    General: Screening Current      Diabetes Screening:     General: Screening Current      Colorectal Cancer Screening:     General: Screening Current      Prostate Cancer Screening:    General: Screening Current      Osteoporosis Screening:    General: Screening Not Indicated      Abdominal Aortic Aneurysm (AAA) Screening:    Risk factors include: age between 73-69 yo and tobacco use        Lung Cancer Screening:     General: Screening Not Indicated      Hepatitis C Screening:    General: Screening Current    Screening, Brief Intervention, and Referral to Treatment (SBIRT)    Screening  Typical number of drinks in a day: 0  Typical number of drinks in a week: 0  Interpretation: Low risk drinking behavior        Katie Levy MD

## 2021-04-02 NOTE — ASSESSMENT & PLAN NOTE
Lab Results   Component Value Date    EGFR 36 08/05/2019    CREATININE 1 50 (H) 12/09/2020    CREATININE 1 55 (H) 06/01/2020    CREATININE 1 68 (H) 11/11/2019   · At baseline  · Currently normotensive with current management    · Continue Zesteril daily

## 2021-04-02 NOTE — ASSESSMENT & PLAN NOTE
· Recent admission for COVID 19 PNA with hypoxic resp failure that resulted in b/l PTX  · Has a chronic dry cough  · Will prescribe guaifenesin syrup

## 2021-04-02 NOTE — PATIENT INSTRUCTIONS

## 2021-04-26 DIAGNOSIS — R05.9 COUGH: ICD-10-CM

## 2021-04-26 NOTE — TELEPHONE ENCOUNTER
Patient called requesting a refill of the cough medication containing Codeine  He is taking it 3 times/day and states that the cough is improving but persists        Last OV 4/2/21  Next OV 10/15/21

## 2021-04-28 RX ORDER — GUAIFENESIN AND CODEINE PHOSPHATE 100; 10 MG/5ML; MG/5ML
5 SOLUTION ORAL 3 TIMES DAILY PRN
Qty: 236 ML | Refills: 1 | Status: SHIPPED | OUTPATIENT
Start: 2021-04-28 | End: 2021-10-15 | Stop reason: SDUPTHER

## 2021-05-26 DIAGNOSIS — M10.9 GOUT, ARTHROPATHY: ICD-10-CM

## 2021-05-27 RX ORDER — ALLOPURINOL 300 MG/1
300 TABLET ORAL DAILY
Qty: 90 TABLET | Refills: 1 | Status: SHIPPED | OUTPATIENT
Start: 2021-05-27 | End: 2022-02-08 | Stop reason: SDUPTHER

## 2021-06-18 DIAGNOSIS — I12.9 BENIGN HYPERTENSION WITH CHRONIC KIDNEY DISEASE: ICD-10-CM

## 2021-06-18 RX ORDER — POTASSIUM CHLORIDE 750 MG/1
10 TABLET, EXTENDED RELEASE ORAL DAILY
Qty: 90 TABLET | Refills: 1 | Status: SHIPPED | OUTPATIENT
Start: 2021-06-18 | End: 2022-03-29 | Stop reason: SDUPTHER

## 2021-07-02 DIAGNOSIS — M16.9 OSTEOARTHRITIS OF HIP, UNSPECIFIED LATERALITY, UNSPECIFIED OSTEOARTHRITIS TYPE: ICD-10-CM

## 2021-07-02 DIAGNOSIS — M16.12 PRIMARY OSTEOARTHRITIS OF LEFT HIP: ICD-10-CM

## 2021-10-15 ENCOUNTER — OFFICE VISIT (OUTPATIENT)
Dept: INTERNAL MEDICINE CLINIC | Facility: CLINIC | Age: 76
End: 2021-10-15
Payer: MEDICARE

## 2021-10-15 VITALS
SYSTOLIC BLOOD PRESSURE: 118 MMHG | HEIGHT: 67 IN | WEIGHT: 211.6 LBS | TEMPERATURE: 98.2 F | DIASTOLIC BLOOD PRESSURE: 70 MMHG | HEART RATE: 75 BPM | OXYGEN SATURATION: 98 % | BODY MASS INDEX: 33.21 KG/M2

## 2021-10-15 DIAGNOSIS — N18.30 BENIGN HYPERTENSION WITH CHRONIC KIDNEY DISEASE, STAGE III (HCC): Primary | ICD-10-CM

## 2021-10-15 DIAGNOSIS — M1A.3710 CHRONIC GOUT DUE TO RENAL IMPAIRMENT INVOLVING TOE OF RIGHT FOOT WITHOUT TOPHUS: ICD-10-CM

## 2021-10-15 DIAGNOSIS — I12.9 BENIGN HYPERTENSION WITH CHRONIC KIDNEY DISEASE: ICD-10-CM

## 2021-10-15 DIAGNOSIS — R60.9 PERIPHERAL EDEMA: ICD-10-CM

## 2021-10-15 DIAGNOSIS — I12.9 BENIGN HYPERTENSION WITH CHRONIC KIDNEY DISEASE, STAGE III (HCC): Primary | ICD-10-CM

## 2021-10-15 DIAGNOSIS — N18.31 CHRONIC KIDNEY DISEASE (CKD) STAGE G3A/A1, MODERATELY DECREASED GLOMERULAR FILTRATION RATE (GFR) BETWEEN 45-59 ML/MIN/1.73 SQUARE METER AND ALBUMINURIA CREATININE RATIO LESS THAN 30 MG/G (HCC): ICD-10-CM

## 2021-10-15 DIAGNOSIS — R05.9 COUGH: ICD-10-CM

## 2021-10-15 DIAGNOSIS — M16.12 PRIMARY OSTEOARTHRITIS OF LEFT HIP: ICD-10-CM

## 2021-10-15 PROBLEM — J93.9 BILATERAL PNEUMOTHORACES: Status: RESOLVED | Noted: 2021-01-09 | Resolved: 2021-10-15

## 2021-10-15 PROCEDURE — 99214 OFFICE O/P EST MOD 30 MIN: CPT | Performed by: INTERNAL MEDICINE

## 2021-10-15 RX ORDER — LISINOPRIL 5 MG/1
5 TABLET ORAL DAILY
Qty: 90 TABLET | Refills: 1 | Status: SHIPPED | OUTPATIENT
Start: 2021-10-15 | End: 2022-06-21

## 2021-10-15 RX ORDER — GUAIFENESIN AND CODEINE PHOSPHATE 100; 10 MG/5ML; MG/5ML
5 SOLUTION ORAL 3 TIMES DAILY PRN
Qty: 236 ML | Refills: 1 | Status: SHIPPED | OUTPATIENT
Start: 2021-10-15 | End: 2022-02-08 | Stop reason: ALTCHOICE

## 2021-10-15 RX ORDER — TORSEMIDE 10 MG/1
10 TABLET ORAL DAILY
Qty: 90 TABLET | Refills: 1 | Status: SHIPPED | OUTPATIENT
Start: 2021-10-15 | End: 2022-02-08

## 2021-11-05 DIAGNOSIS — M16.9 OSTEOARTHRITIS OF HIP, UNSPECIFIED LATERALITY, UNSPECIFIED OSTEOARTHRITIS TYPE: ICD-10-CM

## 2021-11-05 DIAGNOSIS — M16.12 PRIMARY OSTEOARTHRITIS OF LEFT HIP: ICD-10-CM

## 2021-11-19 LAB
BUN SERPL-MCNC: 33 MG/DL (ref 7–25)
BUN/CREAT SERPL: 18 (CALC) (ref 6–22)
CALCIUM SERPL-MCNC: 9.4 MG/DL (ref 8.6–10.3)
CHLORIDE SERPL-SCNC: 103 MMOL/L (ref 98–110)
CO2 SERPL-SCNC: 27 MMOL/L (ref 20–32)
CREAT SERPL-MCNC: 1.84 MG/DL (ref 0.7–1.18)
ERYTHROCYTE [DISTWIDTH] IN BLOOD BY AUTOMATED COUNT: 13 % (ref 11–15)
GLUCOSE SERPL-MCNC: 101 MG/DL (ref 65–99)
HCT VFR BLD AUTO: 39.4 % (ref 38.5–50)
HGB BLD-MCNC: 13.3 G/DL (ref 13.2–17.1)
MCH RBC QN AUTO: 30.5 PG (ref 27–33)
MCHC RBC AUTO-ENTMCNC: 33.8 G/DL (ref 32–36)
MCV RBC AUTO: 90.4 FL (ref 80–100)
PLATELET # BLD AUTO: 299 THOUSAND/UL (ref 140–400)
PMV BLD REES-ECKER: 9.5 FL (ref 7.5–12.5)
POTASSIUM SERPL-SCNC: 4.6 MMOL/L (ref 3.5–5.3)
RBC # BLD AUTO: 4.36 MILLION/UL (ref 4.2–5.8)
SL AMB EGFR AFRICAN AMERICAN: 41 ML/MIN/1.73M2
SL AMB EGFR NON AFRICAN AMERICAN: 35 ML/MIN/1.73M2
SODIUM SERPL-SCNC: 138 MMOL/L (ref 135–146)
URATE SERPL-MCNC: 4.7 MG/DL (ref 4–8)
WBC # BLD AUTO: 8.2 THOUSAND/UL (ref 3.8–10.8)

## 2022-01-07 DIAGNOSIS — M16.12 PRIMARY OSTEOARTHRITIS OF LEFT HIP: ICD-10-CM

## 2022-01-07 DIAGNOSIS — M16.9 OSTEOARTHRITIS OF HIP, UNSPECIFIED LATERALITY, UNSPECIFIED OSTEOARTHRITIS TYPE: ICD-10-CM

## 2022-01-07 NOTE — TELEPHONE ENCOUNTER
Pending appt 1/18/22 11/05/2021  1  11/05/2021  TRAMADOL-ACETAMINOPHN 37 5-325  100 0  13  BR THO  [id number]  PENNS ([id number])  0  28 85 MME  [place]  [place]    10/15/2021  1  10/15/2021  CODEINE-GUAIFEN  MG/5 ML  120 0  10  BR [facility]O  [id number]  PENNS ([id number])  0  3 6 MME  [place]  [place]    08/31/2021  1  07/02/2021  TRAMADOL-ACETAMINOPHN 37 5-325  50 0  7  BR [facility]O  [id number]  PENNS ([id number])  1  26 79 MME  [place]    07/02/2021  1  07/02/2021  TRAMADOL-ACETAMINOPHN 37 5-325  50 0  7  BR [facility]O  [id number]  P[facility]NS ([id number])  0  26 79 MME  [place]    05/04/2021  1  01/06/2021  TRAMADOL-ACETAMINOPHN 37 5-325  44 0  5  BR [facility]O  [id number]  PENNS ([id number])  1  33 0 MME  [place]    04/28/2021  1  04/28/2021  CODEINE-GUAIFEN  MG/5 ML  236 0  15  BR [facility]O  [id number]  PENNS ([id number])  0  4 72 MME  Private

## 2022-02-08 ENCOUNTER — OFFICE VISIT (OUTPATIENT)
Dept: INTERNAL MEDICINE CLINIC | Facility: CLINIC | Age: 77
End: 2022-02-08
Payer: MEDICARE

## 2022-02-08 VITALS
SYSTOLIC BLOOD PRESSURE: 114 MMHG | DIASTOLIC BLOOD PRESSURE: 68 MMHG | OXYGEN SATURATION: 97 % | BODY MASS INDEX: 33.4 KG/M2 | TEMPERATURE: 97.3 F | WEIGHT: 207.8 LBS | HEIGHT: 66 IN | HEART RATE: 84 BPM

## 2022-02-08 DIAGNOSIS — I12.9 BENIGN HYPERTENSION WITH CHRONIC KIDNEY DISEASE, STAGE III (HCC): Primary | ICD-10-CM

## 2022-02-08 DIAGNOSIS — N18.31 CHRONIC KIDNEY DISEASE (CKD) STAGE G3A/A1, MODERATELY DECREASED GLOMERULAR FILTRATION RATE (GFR) BETWEEN 45-59 ML/MIN/1.73 SQUARE METER AND ALBUMINURIA CREATININE RATIO LESS THAN 30 MG/G (HCC): ICD-10-CM

## 2022-02-08 DIAGNOSIS — F11.20 CONTINUOUS OPIOID DEPENDENCE (HCC): ICD-10-CM

## 2022-02-08 DIAGNOSIS — M16.12 PRIMARY OSTEOARTHRITIS OF LEFT HIP: ICD-10-CM

## 2022-02-08 DIAGNOSIS — M16.9 OSTEOARTHRITIS OF HIP, UNSPECIFIED LATERALITY, UNSPECIFIED OSTEOARTHRITIS TYPE: ICD-10-CM

## 2022-02-08 DIAGNOSIS — R60.9 PERIPHERAL EDEMA: ICD-10-CM

## 2022-02-08 DIAGNOSIS — R97.20 ELEVATED PSA: ICD-10-CM

## 2022-02-08 DIAGNOSIS — N18.30 BENIGN HYPERTENSION WITH CHRONIC KIDNEY DISEASE, STAGE III (HCC): Primary | ICD-10-CM

## 2022-02-08 DIAGNOSIS — M10.9 GOUT, ARTHROPATHY: ICD-10-CM

## 2022-02-08 PROBLEM — M1A.3710 CHRONIC GOUT DUE TO RENAL IMPAIRMENT INVOLVING TOE OF RIGHT FOOT WITHOUT TOPHUS: Status: RESOLVED | Noted: 2017-04-10 | Resolved: 2022-02-08

## 2022-02-08 PROBLEM — J96.90 RESPIRATORY FAILURE (HCC): Status: RESOLVED | Noted: 2020-12-29 | Resolved: 2022-02-08

## 2022-02-08 PROCEDURE — 99214 OFFICE O/P EST MOD 30 MIN: CPT | Performed by: INTERNAL MEDICINE

## 2022-02-08 RX ORDER — TORSEMIDE 10 MG/1
20 TABLET ORAL DAILY
Qty: 90 TABLET | Refills: 1
Start: 2022-02-08 | End: 2022-03-25 | Stop reason: SDUPTHER

## 2022-02-08 RX ORDER — ALLOPURINOL 300 MG/1
300 TABLET ORAL DAILY
Qty: 90 TABLET | Refills: 1 | Status: SHIPPED | OUTPATIENT
Start: 2022-02-08

## 2022-02-08 NOTE — ASSESSMENT & PLAN NOTE
Patient has been receiving a tramadol prescription for his hip arthritis    Dosage has remained constant

## 2022-02-08 NOTE — ASSESSMENT & PLAN NOTE
Lab Results   Component Value Date    EGFR 36 08/05/2019    CREATININE 1 84 (H) 11/19/2021    CREATININE 1 50 (H) 12/09/2020    CREATININE 1 55 (H) 06/01/2020   Blood pressure is stable and nicely controlled

## 2022-02-08 NOTE — PROGRESS NOTES
Assessment/Plan:    Peripheral edema  Will increase torsemide to 20 mg daily  Follow-up BMP in 2 months    Elevated PSA  Has follow-up scheduled with Urology  Degenerative arthritis of hip  Tramadol was renewed  Patient rates his pain at a 4/10 today  He declines surgery at this time    Continuous opioid dependence [facility])  Patient has been receiving a tramadol prescription for his hip arthritis  Dosage has remained constant    Chronic kidney disease (CKD) stage G3a/A1, moderately decreased glomerular filtration rate (GFR) between 45-59 mL/min/1 73 square meter and albuminuria creatinine ratio less than 30 mg/g ([facility])  Lab Results   Component Value Date    EGFR 36 08/05/2019    CREATININE 1 84 (H) 11/19/2021    CREATININE 1 50 (H) 12/09/2020    CREATININE 1 55 (H) 06/01/2020   Will need to monitor serum creatinine response to the increased dose of diuretic    Benign hypertension with chronic kidney disease, stage III [facility])  Lab Results   Component Value Date    EGFR 36 08/05/2019    CREATININE 1 84 (H) 11/19/2021    CREATININE 1 50 (H) 12/09/2020    CREATININE 1 55 (H) 06/01/2020   Blood pressure is stable and nicely controlled  Diagnoses and all orders for this visit:    Benign hypertension with chronic kidney disease, stage III ([facility])    Gout, arthropathy  -     allopurinol (ZYLOPRIM) 300 mg tablet; Take 1 tablet (300 mg total) by mouth daily    Osteoarthritis of hip, unspecified laterality, unspecified osteoarthritis type  -     traMADol-acetaminophen (ULTRACET) 37 5-325 mg per tablet; Take 2 tablets by mouth every 6 (six) hours as needed for moderate pain    Primary osteoarthritis of left hip  -     traMADol-acetaminophen (ULTRACET) 37 5-325 mg per tablet;  Take 2 tablets by mouth every 6 (six) hours as needed for moderate pain    Chronic kidney disease (CKD) stage G3a/A1, moderately decreased glomerular filtration rate (GFR) between 45-59 mL/min/1 73 square meter and albuminuria creatinine ratio less than 30 mg/g ([facility])    Elevated PSA    Peripheral edema  -     torsemide (DEMADEX) 10 mg tablet; Take 2 tablets (20 mg total) by mouth daily  -     Basic metabolic panel; Future  -     CBC and differential; Future  -     UA (URINE) with reflex to Scope; Future    Continuous opioid dependence ([facility] )          Subjective:      Patient ID: Laura Stafford is a 77 y o  male  Patient presents to the office for follow-up visit  He has not had any recent lab work done  He continues to complain of some left hip pain and stiffness which is worse after periods of inactivity  He continues to demonstrate peripheral edema  He denies any dyspnea, chest pain, palpitations orthopnea or PND          Family History   Problem Relation Age of Onset    Breast cancer Mother     Coronary artery disease Father      Social History     Socioeconomic History    Marital status: Significant Other     Spouse name: Not on file    Number of children: Not on file    Years of education: Not on file    Highest education level: Not on file   Occupational History    Not on file   Tobacco Use    Smoking status: Former Smoker     Types: Pipe     Quit date: 2021     Years since quittin 9    Smokeless tobacco: Never Used   Vaping Use    Vaping Use: Never used   Substance and Sexual Activity    Alcohol use: No    Drug use: No    Sexual activity: Not on file   Other Topics Concern    Not on file   Social History Narrative    Not on file     Social Determinants of Health     Financial Resource Strain: Not on file   Food Insecurity: Not on file   Transportation Needs: Not on file   Physical Activity: Not on file   Stress: Not on file   Social Connections: Not on file   Intimate Partner Violence: Not on file   Housing Stability: Not on file     Past Medical History:   Diagnosis Date    Benign hypertension with chronic kidney disease, stage III ([facility] ) 2016    Bilateral pneumothoraces 2021    Colon polyps     Degenerative arthritis of hip 3/2/2015    Gout     Impacted cerumen of both ears     last assessed-2/9/2015    Renal calculi     last assessed-9/22/2014    Respiratory failure ([address] ) 12/29/2020    Urinary problem     last assessed-9/22/2014    Uveitis     last assessed-2/22/2016       Current Outpatient Medications:     allopurinol (ZYLOPRIM) 300 mg tablet, Take 1 tablet (300 mg total) by mouth daily, Disp: 90 tablet, Rfl: 1    lisinopril (ZESTRIL) 5 mg tablet, Take 1 tablet (5 mg total) by mouth daily (Patient taking differently: Take 5 mg by mouth every other day  ), Disp: 90 tablet, Rfl: 1    mometasone (ELOCON) 0 1 % cream, Apply topically daily, Disp: 45 g, Rfl: 0    potassium chloride (Klor-Con M10) 10 mEq tablet, Take 1 tablet (10 mEq total) by mouth daily, Disp: 90 tablet, Rfl: 1    tamsulosin (FLOMAX) 0 4 mg, Take 0 4 mg by mouth daily at bedtime  , Disp: , Rfl:     torsemide (DEMADEX) 10 mg tablet, Take 2 tablets (20 mg total) by mouth daily, Disp: 90 tablet, Rfl: 1    traMADol-acetaminophen (ULTRACET) 37 5-325 mg per tablet, Take 2 tablets by mouth every 6 (six) hours as needed for moderate pain, Disp: 100 tablet, Rfl: 0  No Known Allergies  Past Surgical History:   Procedure Laterality Date    CATARACT EXTRACTION Right     MI COLONOSCOPY FLX DX W/COLLJ SPEC WHEN PFRMD N/A 10/8/2018    Procedure: COLONOSCOPY;  Surgeon: Carolin Valentine MD;  Location: [facility]  GI LAB; Service: Gastroenterology    TONSILLECTOMY      last assessed-9/22/2014         Review of Systems   Constitutional: Positive for activity change (Limited by hip discomfort)  Negative for appetite change, chills, diaphoresis, fatigue, fever and unexpected weight change  HENT: Negative  Eyes: Negative  Respiratory: Negative  Cardiovascular: Positive for leg swelling  Gastrointestinal: Negative  Endocrine: Negative  Genitourinary: Positive for difficulty urinating (Frequency and decreased strength of stream)     Musculoskeletal: Positive for arthralgias (Left hip)  Allergic/Immunologic: Negative  Neurological: Negative  Hematological: Negative  Psychiatric/Behavioral: Negative  Objective:      /68 (BP Location: Left arm, Patient Position: Sitting, Cuff Size: Standard)   Pulse 84   Temp (!) 97 3 °F (36 3 °C) (Tympanic)   Ht 5' 6" (1 676 m)   Wt 94 3 kg (207 lb 12 8 oz)   SpO2 97%   BMI 33 54 kg/m²          Physical Exam  Vitals reviewed  Constitutional:       General: He is not in acute distress  Appearance: Normal appearance  He is normal weight  He is not ill-appearing, toxic-appearing or diaphoretic  HENT:      Head: Normocephalic and atraumatic  Right Ear: External ear normal       Left Ear: External ear normal       Nose: Nose normal    Eyes:      General: No scleral icterus  Conjunctiva/sclera: Conjunctivae normal       Pupils: Pupils are equal, round, and reactive to light  Neck:      Vascular: No carotid bruit or JVD  Trachea: No tracheal deviation  Cardiovascular:      Rate and Rhythm: Normal rate and regular rhythm  Pulses: Normal pulses  Heart sounds: Normal heart sounds  No murmur heard  Pulmonary:      Effort: Pulmonary effort is normal  No respiratory distress  Breath sounds: Normal breath sounds  No wheezing or rales  Abdominal:      General: Abdomen is flat  There is no distension  Musculoskeletal:         General: No swelling  Cervical back: Neck supple  Right lower leg: Edema (2 to 3+ pretibial edema) present  Left lower leg: Edema (2 to 3+ pretibial edema) present  Skin:     General: Skin is warm  Coloration: Skin is not jaundiced  Findings: No bruising, erythema or rash  Neurological:      General: No focal deficit present  Mental Status: He is alert and oriented to person, place, and time  Mental status is at baseline     Psychiatric:         Mood and Affect: Mood normal          Behavior: Behavior normal

## 2022-02-08 NOTE — ASSESSMENT & PLAN NOTE
Lab Results   Component Value Date    EGFR 36 08/05/2019    CREATININE 1 84 (H) 11/19/2021    CREATININE 1 50 (H) 12/09/2020    CREATININE 1 55 (H) 06/01/2020   Will need to monitor serum creatinine response to the increased dose of diuretic

## 2022-03-09 DIAGNOSIS — M16.9 OSTEOARTHRITIS OF HIP, UNSPECIFIED LATERALITY, UNSPECIFIED OSTEOARTHRITIS TYPE: ICD-10-CM

## 2022-03-09 DIAGNOSIS — M16.12 PRIMARY OSTEOARTHRITIS OF LEFT HIP: ICD-10-CM

## 2022-03-21 ENCOUNTER — TRANSITIONAL CARE MANAGEMENT (OUTPATIENT)
Dept: INTERNAL MEDICINE CLINIC | Facility: OTHER | Age: 77
End: 2022-03-21

## 2022-03-22 ENCOUNTER — OFFICE VISIT (OUTPATIENT)
Dept: INTERNAL MEDICINE CLINIC | Facility: CLINIC | Age: 77
End: 2022-03-22
Payer: MEDICARE

## 2022-03-22 VITALS
OXYGEN SATURATION: 97 % | TEMPERATURE: 99.2 F | SYSTOLIC BLOOD PRESSURE: 94 MMHG | HEIGHT: 66 IN | WEIGHT: 188.6 LBS | BODY MASS INDEX: 30.31 KG/M2 | DIASTOLIC BLOOD PRESSURE: 58 MMHG | HEART RATE: 103 BPM

## 2022-03-22 DIAGNOSIS — N17.9 AKI (ACUTE KIDNEY INJURY) (HCC): Primary | ICD-10-CM

## 2022-03-22 DIAGNOSIS — I12.9 BENIGN HYPERTENSION WITH CHRONIC KIDNEY DISEASE, STAGE III (HCC): ICD-10-CM

## 2022-03-22 DIAGNOSIS — N18.31 CHRONIC KIDNEY DISEASE (CKD) STAGE G3A/A1, MODERATELY DECREASED GLOMERULAR FILTRATION RATE (GFR) BETWEEN 45-59 ML/MIN/1.73 SQUARE METER AND ALBUMINURIA CREATININE RATIO LESS THAN 30 MG/G (HCC): ICD-10-CM

## 2022-03-22 DIAGNOSIS — N18.30 BENIGN HYPERTENSION WITH CHRONIC KIDNEY DISEASE, STAGE III (HCC): ICD-10-CM

## 2022-03-22 DIAGNOSIS — R60.9 PERIPHERAL EDEMA: ICD-10-CM

## 2022-03-22 DIAGNOSIS — M16.12 PRIMARY OSTEOARTHRITIS OF LEFT HIP: ICD-10-CM

## 2022-03-22 PROBLEM — L89.153 PRESSURE INJURY OF SACRAL REGION, STAGE 3 (HCC): Status: RESOLVED | Noted: 2021-02-03 | Resolved: 2022-03-22

## 2022-03-22 LAB
BASOPHILS # BLD AUTO: 51 CELLS/UL (ref 0–200)
BASOPHILS NFR BLD AUTO: 0.5 %
BUN SERPL-MCNC: 37 MG/DL (ref 7–25)
BUN/CREAT SERPL: 13 (CALC) (ref 6–22)
CALCIUM SERPL-MCNC: 9.6 MG/DL (ref 8.6–10.3)
CHLORIDE SERPL-SCNC: 104 MMOL/L (ref 98–110)
CO2 SERPL-SCNC: 25 MMOL/L (ref 20–32)
CREAT SERPL-MCNC: 2.88 MG/DL (ref 0.7–1.18)
EOSINOPHIL # BLD AUTO: 81 CELLS/UL (ref 15–500)
EOSINOPHIL NFR BLD AUTO: 0.8 %
ERYTHROCYTE [DISTWIDTH] IN BLOOD BY AUTOMATED COUNT: 12.5 % (ref 11–15)
GLUCOSE SERPL-MCNC: 111 MG/DL (ref 65–99)
HCT VFR BLD AUTO: 36.9 % (ref 38.5–50)
HGB BLD-MCNC: 13 G/DL (ref 13.2–17.1)
LYMPHOCYTES # BLD AUTO: 1778 CELLS/UL (ref 850–3900)
LYMPHOCYTES NFR BLD AUTO: 17.6 %
MCH RBC QN AUTO: 31.4 PG (ref 27–33)
MCHC RBC AUTO-ENTMCNC: 35.2 G/DL (ref 32–36)
MCV RBC AUTO: 89.1 FL (ref 80–100)
MONOCYTES # BLD AUTO: 697 CELLS/UL (ref 200–950)
MONOCYTES NFR BLD AUTO: 6.9 %
NEUTROPHILS # BLD AUTO: 7494 CELLS/UL (ref 1500–7800)
NEUTROPHILS NFR BLD AUTO: 74.2 %
PLATELET # BLD AUTO: 302 THOUSAND/UL (ref 140–400)
PMV BLD REES-ECKER: 9.7 FL (ref 7.5–12.5)
POTASSIUM SERPL-SCNC: 4.4 MMOL/L (ref 3.5–5.3)
RBC # BLD AUTO: 4.14 MILLION/UL (ref 4.2–5.8)
SL AMB EGFR AFRICAN AMERICAN: 24 ML/MIN/1.73M2
SL AMB EGFR NON AFRICAN AMERICAN: 20 ML/MIN/1.73M2
SODIUM SERPL-SCNC: 139 MMOL/L (ref 135–146)
WBC # BLD AUTO: 10.1 THOUSAND/UL (ref 3.8–10.8)

## 2022-03-22 PROCEDURE — 99496 TRANSJ CARE MGMT HIGH F2F 7D: CPT | Performed by: INTERNAL MEDICINE

## 2022-03-22 NOTE — ASSESSMENT & PLAN NOTE
Lab Results   Component Value Date    EGFR 36 08/05/2019    CREATININE 1 84 (H) 11/19/2021    CREATININE 1 50 (H) 12/09/2020    CREATININE 1 55 (H) 06/01/2020   Blood pressure remains on the soft side at 94/58  Patient asymptomatic as far as dizziness or lightheadedness instructed the patient to hold lisinopril for an additional week

## 2022-03-22 NOTE — ASSESSMENT & PLAN NOTE
Patient is tolerating his left hip and has no inclination to proceed with left hip arthroplasty at this time

## 2022-03-22 NOTE — PROGRESS NOTES
Assessment/Plan:    Benign hypertension with chronic kidney disease, stage III [facility])  Lab Results   Component Value Date    EGFR 36 08/05/2019    CREATININE 1 84 (H) 11/19/2021    CREATININE 1 50 (H) 12/09/2020    CREATININE 1 55 (H) 06/01/2020   Blood pressure remains on the soft side at 94/58  Patient asymptomatic as far as dizziness or lightheadedness instructed the patient to hold lisinopril for an additional week  JEN (acute kidney injury) ([facility] )  Acute kidney injury improving according to post hospital labs  Primary osteoarthritis of left hip  Patient is tolerating his left hip and has no inclination to proceed with left hip arthroplasty at this time       Diagnoses and all orders for this visit:    JEN (acute kidney injury) ([facility] )  -     Comprehensive metabolic panel; Future  -     Magnesium; Future    Benign hypertension with chronic kidney disease, stage III ([facility])  -     CBC and differential; Future  -     Comprehensive metabolic panel; Future  -     Magnesium; Future    Chronic kidney disease (CKD) stage G3a/A1, moderately decreased glomerular filtration rate (GFR) between 45-59 mL/min/1 73 square meter and albuminuria creatinine ratio less than 30 mg/g ([facility])  -     Comprehensive metabolic panel; Future  -     Magnesium; Future    Primary osteoarthritis of left hip        TCM Call (since 2/19/2022)     Date and time call was made  3/21/2022 10:15 AM    Hospital care reviewed  Records reviewed        Patient was hospitialized at  [facility]        Date of Admission  03/17/22    Date of discharge  03/19/22    Diagnosis  JEN, CKD III    Disposition  Home    Current Symptoms  None      TCM Call (since 2/19/2022)     Post hospital issues  None    Scheduled for follow up?   Yes    Did you obtain your prescribed medications  Yes    Do you need help managing your prescriptions or medications  No    I have advised the patient to call PCP with any new or worsening symptoms  Darrion Ferrari CMA Subjective:      Patient ID: Evert Figueroa is a 77 y o  male  Patient was hospitalized at [facility] after 3 day history of nausea and vomiting he was found to have an acute kidney injury and mildly hypotensive  His medications was held and he was given intravenous fluid with normalization and improvement in his renal function  He was discharged home to outpatient follow-up  His most recent labs show continued improvement in his acute kidney injury and he is very close to his baseline at present time  He feels well he has had no more nausea or vomiting  He denies any lightheadedness or dyspnea on exertion  Peripheral edema has improved some but remains present        Family History   Problem Relation Age of Onset    Breast cancer Mother     Coronary artery disease Father      Social History     Socioeconomic History    Marital status: Significant Other     Spouse name: Not on file    Number of children: Not on file    Years of education: Not on file    Highest education level: Not on file   Occupational History    Not on file   Tobacco Use    Smoking status: Former Smoker     Types: Pipe     Quit date: 2021     Years since quittin 0    Smokeless tobacco: Never Used   Vaping Use    Vaping Use: Never used   Substance and Sexual Activity    Alcohol use: No    Drug use: No    Sexual activity: Not on file   Other Topics Concern    Not on file   Social History Narrative    Not on file     Social Determinants of Health     Financial Resource Strain: Not on file   Food Insecurity: Not on file   Transportation Needs: Not on file   Physical Activity: Not on file   Stress: Not on file   Social Connections: Not on file   Intimate Partner Violence: Not on file   Housing Stability: Not on file     Past Medical History:   Diagnosis Date    Benign hypertension with chronic kidney disease, stage III ([facility] ) 2016    Bilateral pneumothoraces 2021    Colon polyps     Degenerative arthritis of hip 3/2/2015    Gout     Impacted cerumen of both ears     last assessed-2/9/2015    Pressure injury of sacral region, stage 3 ([facility] ) 2/3/2021    Renal calculi     last assessed-9/22/2014    Respiratory failure ([facility] ) 12/29/2020    Urinary problem     last assessed-9/22/2014    Uveitis     last assessed-2/22/2016       Current Outpatient Medications:     allopurinol (ZYLOPRIM) 300 mg tablet, Take 1 tablet (300 mg total) by mouth daily, Disp: 90 tablet, Rfl: 1    lisinopril (ZESTRIL) 5 mg tablet, Take 1 tablet (5 mg total) by mouth daily (Patient taking differently: Take 5 mg by mouth every other day  ), Disp: 90 tablet, Rfl: 1    potassium chloride (Klor-Con M10) 10 mEq tablet, Take 1 tablet (10 mEq total) by mouth daily, Disp: 90 tablet, Rfl: 1    tamsulosin (FLOMAX) 0 4 mg, Take 0 4 mg by mouth daily at bedtime  , Disp: , Rfl:     torsemide (DEMADEX) 10 mg tablet, Take 2 tablets (20 mg total) by mouth daily, Disp: 90 tablet, Rfl: 1    traMADol-acetaminophen (ULTRACET) 37 5-325 mg per tablet, Take 2 tablets by mouth every 6 (six) hours as needed for moderate pain, Disp: 100 tablet, Rfl: 0    mometasone (ELOCON) 0 1 % cream, Apply topically daily (Patient not taking: Reported on 3/22/2022 ), Disp: 45 g, Rfl: 0  No Known Allergies  Past Surgical History:   Procedure Laterality Date    CATARACT EXTRACTION Right     IA COLONOSCOPY FLX DX W/COLLJ SPEC WHEN PFRMD N/A 10/8/2018    Procedure: COLONOSCOPY;  Surgeon: Lorena Nieto MD;  Location: [facility]  GI LAB; Service: Gastroenterology    TONSILLECTOMY      last assessed-9/22/2014         Review of Systems   Constitutional: Negative  Negative for activity change, appetite change, chills, diaphoresis, fatigue, fever and unexpected weight change  HENT: Negative  Eyes: Negative  Respiratory: Negative  Cardiovascular: Positive for leg swelling  Gastrointestinal: Negative  Endocrine: Negative  Genitourinary: Negative  Musculoskeletal: Positive for arthralgias (Left hip)  Skin: Negative  Neurological: Negative  Hematological: Negative  Psychiatric/Behavioral: The patient is not nervous/anxious  Objective:      BP 94/58 (BP Location: Left arm, Patient Position: Sitting, Cuff Size: Standard)   Pulse 103   Temp 99 2 °F (37 3 °C) (Tympanic)   Ht 5' 6" (1 676 m)   Wt 85 5 kg (188 lb 9 6 oz)   SpO2 97%   BMI 30 44 kg/m²          Physical Exam  Vitals reviewed  Constitutional:       General: He is not in acute distress  Appearance: Normal appearance  He is normal weight  He is not ill-appearing, toxic-appearing or diaphoretic  HENT:      Head: Normocephalic and atraumatic  Right Ear: External ear normal       Left Ear: External ear normal       Nose: Nose normal    Eyes:      General: No scleral icterus  Conjunctiva/sclera: Conjunctivae normal       Pupils: Pupils are equal, round, and reactive to light  Neck:      Vascular: No carotid bruit or JVD  Trachea: No tracheal deviation  Cardiovascular:      Rate and Rhythm: Normal rate and regular rhythm  Pulses: Normal pulses  Heart sounds: Normal heart sounds  No murmur heard  Pulmonary:      Effort: Pulmonary effort is normal  No respiratory distress  Breath sounds: Normal breath sounds  No rales  Abdominal:      General: Abdomen is flat  There is no distension  Musculoskeletal:         General: No swelling  Cervical back: Neck supple  Right lower leg: Edema (One to 2+ pretibial edema) present  Left lower leg: Edema (One to 2+ pretibial edema) present  Skin:     General: Skin is warm  Coloration: Skin is not jaundiced  Findings: No bruising, erythema or rash  Neurological:      General: No focal deficit present  Mental Status: He is alert and oriented to person, place, and time  Mental status is at baseline     Psychiatric:         Mood and Affect: Mood normal          Behavior: Behavior normal

## 2022-03-23 NOTE — RESULT ENCOUNTER NOTE
Can you please call Mr Pia Randall and tell him to decrease his torsemide to only 1 pill per day  He should not restart his lisinopril until we do some follow-up blood tests in 2 weeks which I will order    Thank you

## 2022-03-25 RX ORDER — TORSEMIDE 10 MG/1
10 TABLET ORAL DAILY
Qty: 90 TABLET | Refills: 1
Start: 2022-03-25 | End: 2022-05-11 | Stop reason: SDUPTHER

## 2022-03-29 DIAGNOSIS — I12.9 BENIGN HYPERTENSION WITH CHRONIC KIDNEY DISEASE: ICD-10-CM

## 2022-03-29 RX ORDER — POTASSIUM CHLORIDE 750 MG/1
10 TABLET, EXTENDED RELEASE ORAL DAILY
Qty: 90 TABLET | Refills: 1 | Status: SHIPPED | OUTPATIENT
Start: 2022-03-29 | End: 2022-06-21

## 2022-03-29 NOTE — TELEPHONE ENCOUNTER
NOV 04/12/2022  LAST ASSESSED 03/22/2022  Per pt message, wants refill to go to [facility]  Tried calling to confirm since that is not the default pharm but his ph line was continuously busy

## 2022-04-04 LAB
ALBUMIN SERPL-MCNC: 4.3 G/DL (ref 3.6–5.1)
ALBUMIN/GLOB SERPL: 1.7 (CALC) (ref 1–2.5)
ALP SERPL-CCNC: 68 U/L (ref 35–144)
ALT SERPL-CCNC: 11 U/L (ref 9–46)
AST SERPL-CCNC: 13 U/L (ref 10–35)
BASOPHILS # BLD AUTO: 56 CELLS/UL (ref 0–200)
BASOPHILS NFR BLD AUTO: 0.6 %
BILIRUB SERPL-MCNC: 1.6 MG/DL (ref 0.2–1.2)
BUN SERPL-MCNC: 32 MG/DL (ref 7–25)
BUN/CREAT SERPL: 19 (CALC) (ref 6–22)
CALCIUM SERPL-MCNC: 9.8 MG/DL (ref 8.6–10.3)
CHLORIDE SERPL-SCNC: 104 MMOL/L (ref 98–110)
CO2 SERPL-SCNC: 25 MMOL/L (ref 20–32)
CREAT SERPL-MCNC: 1.7 MG/DL (ref 0.7–1.18)
EOSINOPHIL # BLD AUTO: 205 CELLS/UL (ref 15–500)
EOSINOPHIL NFR BLD AUTO: 2.2 %
ERYTHROCYTE [DISTWIDTH] IN BLOOD BY AUTOMATED COUNT: 13.2 % (ref 11–15)
GLOBULIN SER CALC-MCNC: 2.5 G/DL (CALC) (ref 1.9–3.7)
GLUCOSE SERPL-MCNC: 80 MG/DL (ref 65–99)
HCT VFR BLD AUTO: 37 % (ref 38.5–50)
HGB BLD-MCNC: 13.1 G/DL (ref 13.2–17.1)
LYMPHOCYTES # BLD AUTO: 1814 CELLS/UL (ref 850–3900)
LYMPHOCYTES NFR BLD AUTO: 19.5 %
MAGNESIUM SERPL-MCNC: 2.1 MG/DL (ref 1.5–2.5)
MCH RBC QN AUTO: 31.9 PG (ref 27–33)
MCHC RBC AUTO-ENTMCNC: 35.4 G/DL (ref 32–36)
MCV RBC AUTO: 90 FL (ref 80–100)
MONOCYTES # BLD AUTO: 595 CELLS/UL (ref 200–950)
MONOCYTES NFR BLD AUTO: 6.4 %
NEUTROPHILS # BLD AUTO: 6631 CELLS/UL (ref 1500–7800)
NEUTROPHILS NFR BLD AUTO: 71.3 %
PLATELET # BLD AUTO: 335 THOUSAND/UL (ref 140–400)
PMV BLD REES-ECKER: 9.5 FL (ref 7.5–12.5)
POTASSIUM SERPL-SCNC: 4.8 MMOL/L (ref 3.5–5.3)
PROT SERPL-MCNC: 6.8 G/DL (ref 6.1–8.1)
RBC # BLD AUTO: 4.11 MILLION/UL (ref 4.2–5.8)
SL AMB EGFR AFRICAN AMERICAN: 45 ML/MIN/1.73M2
SL AMB EGFR NON AFRICAN AMERICAN: 39 ML/MIN/1.73M2
SODIUM SERPL-SCNC: 139 MMOL/L (ref 135–146)
WBC # BLD AUTO: 9.3 THOUSAND/UL (ref 3.8–10.8)

## 2022-04-12 ENCOUNTER — OFFICE VISIT (OUTPATIENT)
Dept: INTERNAL MEDICINE CLINIC | Facility: CLINIC | Age: 77
End: 2022-04-12
Payer: MEDICARE

## 2022-04-12 VITALS
DIASTOLIC BLOOD PRESSURE: 58 MMHG | OXYGEN SATURATION: 98 % | HEART RATE: 94 BPM | TEMPERATURE: 98.1 F | SYSTOLIC BLOOD PRESSURE: 100 MMHG | HEIGHT: 66 IN | WEIGHT: 187.2 LBS | BODY MASS INDEX: 30.08 KG/M2

## 2022-04-12 DIAGNOSIS — N18.31 CHRONIC KIDNEY DISEASE (CKD) STAGE G3A/A1, MODERATELY DECREASED GLOMERULAR FILTRATION RATE (GFR) BETWEEN 45-59 ML/MIN/1.73 SQUARE METER AND ALBUMINURIA CREATININE RATIO LESS THAN 30 MG/G (HCC): ICD-10-CM

## 2022-04-12 DIAGNOSIS — Z00.00 MEDICARE ANNUAL WELLNESS VISIT, SUBSEQUENT: ICD-10-CM

## 2022-04-12 DIAGNOSIS — M16.9 OSTEOARTHRITIS OF HIP, UNSPECIFIED LATERALITY, UNSPECIFIED OSTEOARTHRITIS TYPE: ICD-10-CM

## 2022-04-12 DIAGNOSIS — M25.551 RIGHT HIP PAIN: ICD-10-CM

## 2022-04-12 DIAGNOSIS — R60.9 PERIPHERAL EDEMA: Primary | ICD-10-CM

## 2022-04-12 DIAGNOSIS — M16.12 PRIMARY OSTEOARTHRITIS OF LEFT HIP: ICD-10-CM

## 2022-04-12 PROCEDURE — G0439 PPPS, SUBSEQ VISIT: HCPCS | Performed by: INTERNAL MEDICINE

## 2022-04-12 PROCEDURE — 1123F ACP DISCUSS/DSCN MKR DOCD: CPT | Performed by: INTERNAL MEDICINE

## 2022-04-12 PROCEDURE — 99214 OFFICE O/P EST MOD 30 MIN: CPT | Performed by: INTERNAL MEDICINE

## 2022-04-12 RX ORDER — TRAMADOL HYDROCHLORIDE 50 MG/1
50 TABLET ORAL EVERY 6 HOURS PRN
Qty: 120 TABLET | Refills: 2 | Status: SHIPPED | OUTPATIENT
Start: 2022-04-12 | End: 2022-07-18 | Stop reason: SDUPTHER

## 2022-04-12 RX ORDER — ACETAMINOPHEN 500 MG
500 TABLET ORAL EVERY 6 HOURS PRN
Qty: 120 TABLET | Refills: 2 | Status: SHIPPED | OUTPATIENT
Start: 2022-04-12

## 2022-04-12 NOTE — ASSESSMENT & PLAN NOTE
Lab Results   Component Value Date    EGFR 36 08/05/2019    CREATININE 1 70 (H) 04/04/2022    CREATININE 2 88 (H) 03/22/2022    CREATININE 1 84 (H) 11/19/2021   Greatly improved kidney function with decrease in diuretic dose and reduction in ACE-inhibitor dosing to alternate day therapy  Blood pressure remains stable and well controlled  Peripheral edema has not increased to any significant degree

## 2022-04-12 NOTE — ASSESSMENT & PLAN NOTE
Most recent x-rays were 5 years ago    We will obtain follow-up imaging for comparison as we obtain imaging of his right hip

## 2022-04-12 NOTE — PROGRESS NOTES
Assessment/Plan:    Chronic kidney disease (CKD) stage G3a/A1, moderately decreased glomerular filtration rate (GFR) between 45-59 mL/min/1 73 square meter and albuminuria creatinine ratio less than 30 mg/g ([facility])  Lab Results   Component Value Date    EGFR 36 08/05/2019    CREATININE 1 70 (H) 04/04/2022    CREATININE 2 88 (H) 03/22/2022    CREATININE 1 84 (H) 11/19/2021   Greatly improved kidney function with decrease in diuretic dose and reduction in ACE-inhibitor dosing to alternate day therapy  Blood pressure remains stable and well controlled  Peripheral edema has not increased to any significant degree  Degenerative arthritis of hip  Most likely patient is developing degenerative osteoarthritis in his right hip  We will obtain x-rays confirmation  Medication change to tramadol 50 mg every 6 hours along with exercise strength Tylenol 500 mg every 6 hours    Peripheral edema  Unchanged with dose reduction in torsemide    Primary osteoarthritis of left hip  Most recent x-rays were 5 years ago  We will obtain follow-up imaging for comparison as we obtain imaging of his right hip    Medicare annual wellness visit, subsequent  Patient is up-to-date with age-appropriate screenings and most vaccines with exception of tetanus       Diagnoses and all orders for this visit:    Peripheral edema  -     acetaminophen (TYLENOL) 500 mg tablet; Take 1 tablet (500 mg total) by mouth every 6 (six) hours as needed for mild pain  -     Basic metabolic panel; Future  -     CBC and Platelet; Future    Osteoarthritis of hip, unspecified laterality, unspecified osteoarthritis type  -     traMADol (Ultram) 50 mg tablet; Take 1 tablet (50 mg total) by mouth every 6 (six) hours as needed for moderate pain  -     acetaminophen (TYLENOL) 500 mg tablet; Take 1 tablet (500 mg total) by mouth every 6 (six) hours as needed for mild pain  -     XR hip/pelv 2-3 vws right if performed;  Future  -     XR hip/pelv 2-3 vws left if performed; Future    Primary osteoarthritis of left hip  -     traMADol (Ultram) 50 mg tablet; Take 1 tablet (50 mg total) by mouth every 6 (six) hours as needed for moderate pain  -     acetaminophen (TYLENOL) 500 mg tablet; Take 1 tablet (500 mg total) by mouth every 6 (six) hours as needed for mild pain  -     XR hip/pelv 2-3 vws right if performed; Future  -     XR hip/pelv 2-3 vws left if performed; Future    Chronic kidney disease (CKD) stage G3a/A1, moderately decreased glomerular filtration rate (GFR) between 45-59 mL/min/1 73 square meter and albuminuria creatinine ratio less than 30 mg/g ([facility])  -     Basic metabolic panel; Future  -     CBC and Platelet; Future    Right hip pain  -     XR hip/pelv 2-3 vws right if performed; Future  -     XR hip/pelv 2-3 vws left if performed; Future    Medicare annual wellness visit, subsequent          Subjective:      Patient ID: José Miguel Dunbar is a 77 y o  male  Patient presents to the office for follow-up visit  He has been experiencing increased pain in his right hip which usually is worse upon immediately arising from a chair  He has never had right hip pain before he describes it as a 5/10  Some days is worse than others  Usually improves with ambulation but it has been uncomfortable in his left hip pain has been chronic but it is tolerable  He has not had any follow-up x-rays in 5 years  He has been using increasing doses of tramadol to keep his pain in check  Appetite remains good  When last seen the patient had an acute kidney injury from his diuretic therapy  His torsemide was reduced by 50% and his ACE-inhibitor was changed to alternate day dosing  His renal function has now returned to his baseline and he has not experienced any significant increase in peripheral edema        Family History   Problem Relation Age of Onset    Breast cancer Mother     Coronary artery disease Father      Social History     Socioeconomic History    Marital status: Significant Other     Spouse name: Not on file    Number of children: Not on file    Years of education: Not on file    Highest education level: Not on file   Occupational History    Not on file   Tobacco Use    Smoking status: Former Smoker     Types: Pipe     Quit date: 2021     Years since quittin 1    Smokeless tobacco: Never Used   Vaping Use    Vaping Use: Never used   Substance and Sexual Activity    Alcohol use: No    Drug use: No    Sexual activity: Not on file   Other Topics Concern    Not on file   Social History Narrative    Not on file     Social Determinants of Health     Financial Resource Strain: Not on file   Food Insecurity: Not on file   Transportation Needs: Not on file   Physical Activity: Not on file   Stress: Not on file   Social Connections: Not on file   Intimate Partner Violence: Not on file   Housing Stability: Not on file     Past Medical History:   Diagnosis Date    Benign hypertension with chronic kidney disease, stage III ([facility] ) 2016    Bilateral pneumothoraces 2021    Colon polyps     Degenerative arthritis of hip 3/2/2015    Gout     Impacted cerumen of both ears     last assessed-2015    Pressure injury of sacral region, stage 3 ([facility] [address] ) 2/3/2021    Renal calculi     last assessed-2014    Res[facility] failure [facility] 2020    Urinary problem     last assessed-2014    Uveitis     last assessed-2016       Current Outpatient Medications:     allopurinol (ZYLOPRIM) 300 mg tablet, Take 1 tablet (300 mg total) by mouth daily, Disp: 90 tablet, Rfl: 1    lisinopril (ZESTRIL) 5 mg tablet, Take 1 tablet (5 mg total) by mouth daily (Patient taking differently: Take 5 mg by mouth every other day  ), Disp: 90 tablet, Rfl: 1    mometasone (ELOCON) 0 1 % cream, Apply topically daily, Disp: 45 g, Rfl: 0    potassium chloride (Klor-Con M10) 10 mEq tablet, Take 1 tablet (10 mEq total) by mouth daily, Disp: 90 tablet, Rfl: 1    tamsulosin (FLOMAX) 0 4 mg, Take 0 4 mg by mouth daily at bedtime  , Disp: , Rfl:     torsemide (DEMADEX) 10 mg tablet, Take 1 tablet (10 mg total) by mouth daily, Disp: 90 tablet, Rfl: 1    acetaminophen (TYLENOL) 500 mg tablet, Take 1 tablet (500 mg total) by mouth every 6 (six) hours as needed for mild pain, Disp: 120 tablet, Rfl: 2    traMADol (Ultram) 50 mg tablet, Take 1 tablet (50 mg total) by mouth every 6 (six) hours as needed for moderate pain, Disp: 120 tablet, Rfl: 2  No Known Allergies  Past Surgical History:   Procedure Laterality Date    CATARACT EXTRACTION Right     VA COLONOSCOPY FLX DX W/COLLJ SPEC WHEN PFRMD N/A 10/8/2018    Procedure: COLONOSCOPY;  Surgeon: Sarah Villatoro MD;  [facility]; Service: Gastroenterology    TONSILLECTOMY      last assessed-9/22/2014         Review of Systems   Constitutional: Negative  Negative for activity change, appetite change, chills, diaphoresis, fatigue, fever and unexpected weight change  HENT: Negative  Eyes: Negative  Respiratory: Negative  Cardiovascular: Positive for leg swelling (Chronic edema bilaterally)  Gastrointestinal: Negative  Endocrine: Negative  Genitourinary: Negative  Musculoskeletal: Positive for arthralgias (Bilateral hip pain)  Skin: Negative  Neurological: Negative  Hematological: Negative  Psychiatric/Behavioral: The patient is not nervous/anxious  Objective:      /58 (BP Location: Left arm, Patient Position: Sitting, Cuff Size: Standard)   Pulse 94   Temp 98 1 °F (36 7 °C) (Tympanic)   Ht 5' 6" (1 676 m)   Wt 84 9 kg (187 lb 3 2 oz)   SpO2 98%   BMI 30 21 kg/m²          Physical Exam  Vitals reviewed  Constitutional:       General: He is not in acute distress  Appearance: He is normal weight  He is not ill-appearing, toxic-appearing or diaphoretic  HENT:      Head: Normocephalic and atraumatic        Right Ear: External ear normal       Left Ear: External ear normal  Mouth/Throat:      Pharynx: Oropharynx is clear  Eyes:      General: No scleral icterus  Conjunctiva/sclera: Conjunctivae normal       Pupils: Pupils are equal, round, and reactive to light  Cardiovascular:      Rate and Rhythm: Normal rate and regular rhythm  Pulses: Normal pulses  Heart sounds: Normal heart sounds  No murmur heard  Pulmonary:      Effort: Pulmonary effort is normal  No respiratory distress  Abdominal:      General: Abdomen is flat  Bowel sounds are normal  There is no distension  Musculoskeletal:         General: Swelling and tenderness (Painful range of motion bilateral hips) present  Cervical back: Tenderness (Bilateral hips) present  Right lower leg: Edema (2+ pretibial pedal edema) present  Left lower leg: Edema (2+ pretibial and pedal) present  Skin:     General: Skin is warm  Capillary Refill: Capillary refill takes less than 2 seconds  Coloration: Skin is not jaundiced  Findings: No bruising, erythema or rash  Neurological:      General: No focal deficit present  Mental Status: He is alert and oriented to person, place, and time  Mental status is at baseline  Cranial Nerves: No cranial nerve deficit     Psychiatric:         Mood and Affect: Mood normal          Behavior: Behavior normal

## 2022-04-12 NOTE — ASSESSMENT & PLAN NOTE
Most likely patient is developing degenerative osteoarthritis in his right hip  We will obtain x-rays confirmation    Medication change to tramadol 50 mg every 6 hours along with exercise strength Tylenol 500 mg every 6 hours

## 2022-04-12 NOTE — PROGRESS NOTES
Assessment and Plan:     Problem List Items Addressed This Visit     None           Preventive health issues were discussed with patient, and age appropriate screening tests were ordered as noted in patient's After Visit Summary  Personalized health advice and appropriate referrals for health education or preventive services given if needed, as noted in patient's After Visit Summary       History of Present Illness:     Patient presents for Medicare Annual Wellness visit    Patient Care Team:  Vanda Saravia MD as PCP - General  MD Deondre Maki MD Allegra Miu, MD as Endoscopist     Problem List:     Patient Active Problem List   Diagnosis    Benign hypertension with chronic kidney disease, stage III ([address] )    Degenerative arthritis of hip    Elevated PSA    Nocturia    Primary osteoarthritis of left hip    Peripheral edema    Medicare annual wellness visit, subsequent    Other atopic dermatitis    Elevated fasting glucose    Pneumonia due to COVID-19 virus    Chronic kidney disease (CKD) stage G3a/A1, moderately decreased glomerular filtration rate (GFR) between 45-59 mL/min/1 73 square meter and albuminuria creatinine ratio less than 30 mg/g (HCC)    Urinary retention    Right elbow pain    Cough    Continuous opioid dependence ([address] )    JEN (acute kidney injury) ([address] )      Past Medical and Surgical History:     Past Medical History:   Diagnosis Date    Benign hypertension with chronic kidney disease, stage III ([address] ) 8/22/2016    Bilateral pneumothoraces 1/9/2021    Colon polyps     Degenerative arthritis of hip 3/2/2015    Gout     Impacted cerumen of both ears     last assessed-2/9/2015    Pressure injury of sacral region, stage 3 ([address] ) 2/3/2021    Renal calculi     last assessed-9/22/2014    Respiratory failure ([address] ) 12/29/2020    Urinary problem     last assessed-9/22/2014    Uveitis     last assessed-2/22/2016     Past Surgical History:   Procedure Laterality Date    CATARACT EXTRACTION Right     AL COLONOSCOPY FLX DX W/COLLJ SPEC WHEN PFRMD N/A 10/8/2018    Procedure: COLONOSCOPY;  Surgeon: Magalys Paula MD;  [facility];   Service: Gastroenterology    TONSILLECTOMY      last assessed-2014      Family History:     Family History   Problem Relation Age of Onset    Breast cancer Mother     Coronary artery disease Father       Social History:     Social History     Socioeconomic History    Marital status: Significant Other     Spouse name: Not on file    Number of children: Not on file    Years of education: Not on file    Highest education level: Not on file   Occupational History    Not on file   Tobacco Use    Smoking status: Former Smoker     Types: Pipe     Quit date: 2021     Years since quittin 1    Smokeless tobacco: Never Used   Vaping Use    Vaping Use: Never used   Substance and Sexual Activity    Alcohol use: No    Drug use: No    Sexual activity: Not on file   Other Topics Concern    Not on file   Social History Narrative    Not on file     Social Determinants of Health     Financial Resource Strain: Not on file   Food Insecurity: Not on file   Transportation Needs: Not on file   Physical Activity: Not on file   Stress: Not on file   Social Connections: Not on file   Intimate Partner Violence: Not on file   Housing Stability: Not on file      Medications and Allergies:     Current Outpatient Medications   Medication Sig Dispense Refill    allopurinol (ZYLOPRIM) 300 mg tablet Take 1 tablet (300 mg total) by mouth daily 90 tablet 1    lisinopril (ZESTRIL) 5 mg tablet Take 1 tablet (5 mg total) by mouth daily (Patient taking differently: Take 5 mg by mouth every other day  ) 90 tablet 1    mometasone (ELOCON) 0 1 % cream Apply topically daily (Patient not taking: Reported on 3/22/2022 ) 45 g 0    potassium chloride (Klor-Con M10) 10 mEq tablet Take 1 tablet (10 mEq total) by mouth daily 90 tablet 1    tamsulosin (FLOMAX) 0 4 mg Take 0 4 mg by mouth daily at bedtime   torsemide (DEMADEX) 10 mg tablet Take 1 tablet (10 mg total) by mouth daily 90 tablet 1    traMADol-acetaminophen (ULTRACET) 37 5-325 mg per tablet Take 2 tablets by mouth every 6 (six) hours as needed for moderate pain 100 tablet 0     No current facility-administered medications for this visit  No Known Allergies   Immunizations:     Immunization History   Administered Date(s) Administered    COVID-19 PFIZER VACCINE 0 3 ML IM 02/16/2021, 03/09/2021, 10/22/2021    COVID-19, unspecified 02/16/2021    INFLUENZA 10/01/2013, 09/16/2014, 10/07/2015, 09/28/2016, 09/20/2017, 09/22/2018, 09/09/2019, 09/05/2021    Influenza Split High Dose Preservative Free IM 09/16/2014, 09/22/2018, 09/09/2019    Influenza, high dose seasonal 0 7 mL 09/06/2020    Influenza, seasonal, injectable 10/16/2015    Pneumococcal Conjugate 13-Valent 07/15/2019    Pneumococcal Polysaccharide PPV23 09/22/2014      Health Maintenance:         Topic Date Due    Colorectal Cancer Screening  10/08/2023    Hepatitis C Screening  Completed         Topic Date Due    DTaP,Tdap,and Td Vaccines (1 - Tdap) Never done      Medicare Health Risk Assessment:     There were no vitals taken for this visit  Vane Upton is here for his Subsequent Wellness visit  Health Risk Assessment:   Patient rates overall health as good  Patient feels that their physical health rating is same  Patient is satisfied with their life  Eyesight was rated as same  Hearing was rated as same  Patient feels that their emotional and mental health rating is same  Patients states they are never, rarely angry  Patient states they are sometimes unusually tired/fatigued  Pain experienced in the last 7 days has been some  Patient's pain rating has been 5/10  Patient states that he has experienced no weight loss or gain in last 6 months  Depression Screening:   PHQ-2 Score: 0      Fall Risk Screening:    In the past year, patient has experienced: no history of falling in past year      Home Safety:  Patient does not have trouble with stairs inside or outside of their home  Patient has working smoke alarms and has no working carbon monoxide detector  Home safety hazards include: none  Nutrition:   Current diet is Regular and Limited junk food  Medications:   Patient is not currently taking any over-the-counter supplements  Patient is able to manage medications  Activities of Daily Living (ADLs)/Instrumental Activities of Daily Living (IADLs):   Walk and transfer into and out of bed and chair?: Yes  Dress and groom yourself?: Yes    Bathe or shower yourself?: Yes    Feed yourself? Yes  Do your laundry/housekeeping?: Yes  Manage your money, pay your bills and track your expenses?: Yes  Make your own meals?: Yes    Do your own shopping?: Yes    Durable Medical Equipment Suppliers  no    Previous Hospitalizations:   Any hospitalizations or ED visits within the last 12 months?: Yes    How many hospitalizations have you had in the last year?: 1-2    Advance Care Planning:   Living will: Yes    Durable POA for healthcare:  Yes    Advanced directive: Yes    Advanced directive counseling given: No    Five wishes given: No    Patient declined ACP directive: Yes    End of Life Decisions reviewed with patient: Yes    Provider agrees with end of life decisions: Yes      Cognitive Screening:   Provider or family/friend/caregiver concerned regarding cognition?: No    PREVENTIVE SCREENINGS      Cardiovascular Screening:    General: Screening Current      Diabetes Screening:     General: Screening Current      Colorectal Cancer Screening:     General: Screening Current      Prostate Cancer Screening:    General: Screening Not Indicated      Osteoporosis Screening:    General: Screening Not Indicated      Abdominal Aortic Aneurysm (AAA) Screening:    Risk factors include: age between 73-67 yo and tobacco use        General: Patient Declines      Lung Cancer Screening:     General: Screening Not Indicated      Hepatitis C Screening:    General: Screening Current    Screening, Brief Intervention, and Referral to Treatment (SBIRT)    Screening  Typical number of drinks in a day: 0  Typical number of drinks in a week: 0  Interpretation: Low risk drinking behavior      Single Item Drug Screening:  How often have you used an illegal drug (including marijuana) or a prescription medication for non-medical reasons in the past year? never    Single Item Drug Screen Score: 0  Interpretation: Negative screen for possible drug use disorder    Review of Current Opioid Use    Opioid Risk Tool (ORT) Interpretation: Complete Opioid Risk Tool (ORT)      Gerhardt Kehr, MD

## 2022-05-11 DIAGNOSIS — R60.9 PERIPHERAL EDEMA: ICD-10-CM

## 2022-05-11 RX ORDER — TORSEMIDE 10 MG/1
10 TABLET ORAL DAILY
Qty: 90 TABLET | Refills: 1 | Status: SHIPPED | OUTPATIENT
Start: 2022-05-11 | End: 2022-06-21

## 2022-06-14 ENCOUNTER — APPOINTMENT (OUTPATIENT)
Dept: RADIOLOGY | Age: 77
End: 2022-06-14
Payer: MEDICARE

## 2022-06-14 DIAGNOSIS — M16.12 PRIMARY OSTEOARTHRITIS OF LEFT HIP: ICD-10-CM

## 2022-06-14 DIAGNOSIS — M16.9 OSTEOARTHRITIS OF HIP, UNSPECIFIED LATERALITY, UNSPECIFIED OSTEOARTHRITIS TYPE: ICD-10-CM

## 2022-06-14 DIAGNOSIS — M25.551 RIGHT HIP PAIN: ICD-10-CM

## 2022-06-14 LAB
BASOPHILS # BLD AUTO: 82 CELLS/UL (ref 0–200)
BASOPHILS NFR BLD AUTO: 0.8 %
BUN SERPL-MCNC: 46 MG/DL (ref 7–25)
BUN/CREAT SERPL: 20 (CALC) (ref 6–22)
CALCIUM SERPL-MCNC: 9.4 MG/DL (ref 8.6–10.3)
CHLORIDE SERPL-SCNC: 103 MMOL/L (ref 98–110)
CO2 SERPL-SCNC: 29 MMOL/L (ref 20–32)
CREAT SERPL-MCNC: 2.29 MG/DL (ref 0.7–1.18)
EOSINOPHIL # BLD AUTO: 184 CELLS/UL (ref 15–500)
EOSINOPHIL NFR BLD AUTO: 1.8 %
ERYTHROCYTE [DISTWIDTH] IN BLOOD BY AUTOMATED COUNT: 12.7 % (ref 11–15)
GLUCOSE SERPL-MCNC: 88 MG/DL (ref 65–99)
HCT VFR BLD AUTO: 35.7 % (ref 38.5–50)
HGB BLD-MCNC: 12.2 G/DL (ref 13.2–17.1)
LYMPHOCYTES # BLD AUTO: 2254 CELLS/UL (ref 850–3900)
LYMPHOCYTES NFR BLD AUTO: 22.1 %
MCH RBC QN AUTO: 31.4 PG (ref 27–33)
MCHC RBC AUTO-ENTMCNC: 34.2 G/DL (ref 32–36)
MCV RBC AUTO: 92 FL (ref 80–100)
MONOCYTES # BLD AUTO: 745 CELLS/UL (ref 200–950)
MONOCYTES NFR BLD AUTO: 7.3 %
NEUTROPHILS # BLD AUTO: 6936 CELLS/UL (ref 1500–7800)
NEUTROPHILS NFR BLD AUTO: 68 %
PLATELET # BLD AUTO: 312 THOUSAND/UL (ref 140–400)
PMV BLD REES-ECKER: 9.5 FL (ref 7.5–12.5)
POTASSIUM SERPL-SCNC: 4.7 MMOL/L (ref 3.5–5.3)
RBC # BLD AUTO: 3.88 MILLION/UL (ref 4.2–5.8)
SL AMB EGFR AFRICAN AMERICAN: 31 ML/MIN/1.73M2
SL AMB EGFR NON AFRICAN AMERICAN: 27 ML/MIN/1.73M2
SODIUM SERPL-SCNC: 140 MMOL/L (ref 135–146)
WBC # BLD AUTO: 10.2 THOUSAND/UL (ref 3.8–10.8)

## 2022-06-14 PROCEDURE — 73521 X-RAY EXAM HIPS BI 2 VIEWS: CPT

## 2022-06-16 ENCOUNTER — RA CDI HCC (OUTPATIENT)
Dept: OTHER | Facility: HOSPITAL | Age: 77
End: 2022-06-16

## 2022-06-16 NOTE — PROGRESS NOTES
[address]  coding opportunities       Chart reviewed, no opportunity found: CHART REVIEWED, NO OPPORTUNITY FOUND        Patients Insurance     Medicare Insurance: Medicare

## 2022-06-21 ENCOUNTER — OFFICE VISIT (OUTPATIENT)
Dept: INTERNAL MEDICINE CLINIC | Facility: CLINIC | Age: 77
End: 2022-06-21
Payer: MEDICARE

## 2022-06-21 VITALS
SYSTOLIC BLOOD PRESSURE: 98 MMHG | DIASTOLIC BLOOD PRESSURE: 64 MMHG | HEIGHT: 66 IN | TEMPERATURE: 98.4 F | BODY MASS INDEX: 27 KG/M2 | OXYGEN SATURATION: 97 % | HEART RATE: 102 BPM | WEIGHT: 168 LBS

## 2022-06-21 DIAGNOSIS — K52.9 ACUTE GASTROENTERITIS: ICD-10-CM

## 2022-06-21 DIAGNOSIS — I12.9 BENIGN HYPERTENSION WITH CHRONIC KIDNEY DISEASE: ICD-10-CM

## 2022-06-21 DIAGNOSIS — R60.9 PERIPHERAL EDEMA: ICD-10-CM

## 2022-06-21 DIAGNOSIS — M16.12 PRIMARY OSTEOARTHRITIS OF LEFT HIP: Primary | ICD-10-CM

## 2022-06-21 DIAGNOSIS — N18.4 CKD STAGE 4 SECONDARY TO HYPERTENSION (HCC): ICD-10-CM

## 2022-06-21 DIAGNOSIS — N17.9 AKI (ACUTE KIDNEY INJURY) (HCC): ICD-10-CM

## 2022-06-21 DIAGNOSIS — I12.9 CKD STAGE 4 SECONDARY TO HYPERTENSION (HCC): ICD-10-CM

## 2022-06-21 PROCEDURE — 20610 DRAIN/INJ JOINT/BURSA W/O US: CPT | Performed by: INTERNAL MEDICINE

## 2022-06-21 PROCEDURE — 99214 OFFICE O/P EST MOD 30 MIN: CPT | Performed by: INTERNAL MEDICINE

## 2022-06-21 RX ORDER — METHYLPREDNISOLONE ACETATE 40 MG/ML
1 INJECTION, SUSPENSION INTRA-ARTICULAR; INTRALESIONAL; INTRAMUSCULAR; SOFT TISSUE
Status: COMPLETED | OUTPATIENT
Start: 2022-06-21 | End: 2022-06-21

## 2022-06-21 RX ORDER — LIDOCAINE HYDROCHLORIDE 10 MG/ML
1 INJECTION, SOLUTION EPIDURAL; INFILTRATION; INTRACAUDAL; PERINEURAL
Status: COMPLETED | OUTPATIENT
Start: 2022-06-21 | End: 2022-06-21

## 2022-06-21 RX ORDER — ONDANSETRON 4 MG/1
4 TABLET, FILM COATED ORAL EVERY 8 HOURS PRN
Qty: 20 TABLET | Refills: 0 | Status: SHIPPED | OUTPATIENT
Start: 2022-06-21 | End: 2022-08-02 | Stop reason: ALTCHOICE

## 2022-06-21 RX ORDER — POTASSIUM CHLORIDE 750 MG/1
10 TABLET, EXTENDED RELEASE ORAL
Qty: 90 TABLET | Refills: 1
Start: 2022-06-21 | End: 2022-07-11

## 2022-06-21 RX ORDER — TORSEMIDE 10 MG/1
10 TABLET ORAL
Qty: 90 TABLET | Refills: 1
Start: 2022-06-21 | End: 2022-08-02

## 2022-06-21 RX ADMIN — LIDOCAINE HYDROCHLORIDE 1 ML: 10 INJECTION, SOLUTION EPIDURAL; INFILTRATION; INTRACAUDAL; PERINEURAL at 15:29

## 2022-06-21 RX ADMIN — METHYLPREDNISOLONE ACETATE 1 ML: 40 INJECTION, SUSPENSION INTRA-ARTICULAR; INTRALESIONAL; INTRAMUSCULAR; SOFT TISSUE at 15:29

## 2022-06-21 NOTE — ASSESSMENT & PLAN NOTE
Lab Results   Component Value Date    EGFR 36 08/05/2019    CREATININE 2 29 (H) 06/14/2022    CREATININE 1 70 (H) 04/04/2022    CREATININE 2 88 (H) 03/22/2022   Torsemide and potassium to be administered every 48 hours    Zestril to be discontinued

## 2022-06-21 NOTE — ASSESSMENT & PLAN NOTE
Patient was given intra-articular steroid injection with 40 mg of methylprednisolone along with 10 mg of 1% xylocaine

## 2022-06-21 NOTE — PROGRESS NOTES
Assessment/Plan:    Peripheral edema  Peripheral edema is much improved but at the expense of his renal function in combination with an acute gastroenteritis  He is mildly hypotensive and orthostatic at times  We will discontinue Zestril and we will reduce his torsemide and potassium to  every 48 hours    Degenerative arthritis of hip  Patient was given intra-articular steroid injection with 40 mg of methylprednisolone along with 10 mg of 1% xylocaine    CKD stage 4 secondary to hypertension [facility])  Lab Results   Component Value Date    EGFR 36 08/05/2019    CREATININE 2 29 (H) 06/14/2022    CREATININE 1 70 (H) 04/04/2022    CREATININE 2 88 (H) 03/22/2022   Torsemide and potassium to be administered every 48 hours  Zestril to be discontinued    JEN (acute kidney injury) ([facility] [address] )  Torsemide and potassium to be administered every 48 hours  Sister French Toyin been discontinued    Acute gastroenteritis  Zofran 4 mg every 8 hours as needed for nausea and vomiting       Diagnoses and all orders for this visit:    Primary osteoarthritis of left hip  -     Basic metabolic panel; Future  -     Magnesium; Future    CKD stage 4 secondary to hypertension (HCC)  -     Basic metabolic panel; Future  -     Magnesium; Future  -     ondansetron (ZOFRAN) 4 mg tablet; Take 1 tablet (4 mg total) by mouth every 8 (eight) hours as needed for nausea or vomiting    JEN (acute kidney injury) ([address] )  -     Basic metabolic panel; Future  -     Magnesium; Future  -     ondansetron (ZOFRAN) 4 mg tablet; Take 1 tablet (4 mg total) by mouth every 8 (eight) hours as needed for nausea or vomiting    Acute gastroenteritis  -     ondansetron (ZOFRAN) 4 mg tablet; Take 1 tablet (4 mg total) by mouth every 8 (eight) hours as needed for nausea or vomiting    Benign hypertension with chronic kidney disease  -     potassium chloride (Klor-Con M10) 10 mEq tablet;  Take 1 tablet (10 mEq total) by mouth every other day    Peripheral edema  -     torsemide (DEMADEX) 10 mg tablet; Take 1 tablet (10 mg total) by mouth every other day          Subjective:      Patient ID: Madelin Solis is a 69 y o  male  Patient presents to the office for follow-up visit  For the last week and a half he has been bothered by some gastroenteritis symptoms initially with vomiting in the inability a keep any food down and now he is experiencing some diarrhea  He has been able to keep his food down for the last 24 hours without any vomiting  He recently had some repeat x-rays of his hips because of worsening left hip pain which disclosed severe degenerative osteoarthritis in his left hip  Patient has been reluctant to see Orthopedics for hip replacement  He is requesting intra-articular steroid injection  He had labs drawn recently as well which disclosed an acute kidney injury probably a combination of his vomiting and diarrhea along with continued use of diuretics for his peripheral edema        Family History   Problem Relation Age of Onset    Breast cancer Mother     Coronary artery disease Father      Social History     Socioeconomic History    Marital status: Significant Other     Spouse name: Not on file    Number of children: Not on file    Years of education: Not on file    Highest education level: Not on file   Occupational History    Not on file   Tobacco Use    Smoking status: Former Smoker     Types: Pipe     Quit date: 2021     Years since quittin 3    Smokeless tobacco: Never Used   Vaping Use    Vaping Use: Never used   Substance and Sexual Activity    Alcohol use: No    Drug use: No    Sexual activity: Not on file   Other Topics Concern    Not on file   Social History Narrative    Not on file     Social Determinants of Health     Financial Resource Strain: Not on file   Food Insecurity: Not on file   Transportation Needs: Not on file   Physical Activity: Not on file   Stress: Not on file   Social Connections: Not on file   Intimate Partner Violence: Not on file   Housing Stability: Not on file     Past Medical History:   Diagnosis Date    Benign hypertension with chronic kidney disease, stage III ([facility] [address] ) 8/22/2016    Bilateral pneumothoraces 1/9/2021    Colon polyps     Degenerative arthritis of hip 3/2/2015    Gout     Impacted cerumen of both ears     last assessed-2/9/2015    Pressure injury of sacral region, stage 3 ([facility] [address] ) 2/3/2021    Renal calculi     last assessed-9/22/2014    Respiratory failure ([place] [address] ) 12/29/2020    Urinary problem     last assessed-9/22/2014    Uveitis     last assessed-2/22/2016       Current Outpatient Medications:     acetaminophen (TYLENOL) 500 mg tablet, Take 1 tablet (500 mg total) by mouth every 6 (six) hours as needed for mild pain, Disp: 120 tablet, Rfl: 2    allopurinol (ZYLOPRIM) 300 mg tablet, Take 1 tablet (300 mg total) by mouth daily, Disp: 90 tablet, Rfl: 1    ondansetron (ZOFRAN) 4 mg tablet, Take 1 tablet (4 mg total) by mouth every 8 (eight) hours as needed for nausea or vomiting, Disp: 20 tablet, Rfl: 0    potassium chloride (Klor-Con M10) 10 mEq tablet, Take 1 tablet (10 mEq total) by mouth every other day, Disp: 90 tablet, Rfl: 1    tamsulosin (FLOMAX) 0 4 mg, Take 0 4 mg by mouth daily at bedtime  , Disp: , Rfl:     torsemide (DEMADEX) 10 mg tablet, Take 1 tablet (10 mg total) by mouth every other day, Disp: 90 tablet, Rfl: 1    traMADol (Ultram) 50 mg tablet, Take 1 tablet (50 mg total) by mouth every 6 (six) hours as needed for moderate pain, Disp: 120 tablet, Rfl: 2    mometasone (ELOCON) 0 1 % cream, Apply topically daily (Patient not taking: Reported on 6/21/2022), Disp: 45 g, Rfl: 0  No Known Allergies  Past Surgical History:   Procedure Laterality Date    CATARACT EXTRACTION Right     [place] COLONOSCOPY FLX DX W/COLLJ SPEC WHEN PFRMD N/A 10/8/2018    Procedure: COLONOSCOPY;  Surgeon: Twyla Valenzuela MD;  Location: [facility]  GI [facility];   Service: Gastroenterology    TONSILLECTOMY      last assessed-9/22/2014 Review of Systems   Constitutional: Positive for appetite change (Due to nausea and vomiting)  Negative for chills, diaphoresis and fever  HENT: Negative  Eyes: Negative  Respiratory: Negative  Cardiovascular: Negative  Gastrointestinal: Positive for diarrhea, nausea and vomiting  Genitourinary: Negative  Musculoskeletal: Positive for arthralgias (Severe left hip pain)  Neurological: Negative  Hematological: Negative  Psychiatric/Behavioral: Negative  Objective:      BP 98/64 (BP Location: Left arm, Patient Position: Sitting, Cuff Size: Standard)   Pulse 102   Temp 98 4 °F (36 9 °C) (Tympanic)   Ht 5' 5 87" (1 673 m)   Wt 76 2 kg (168 lb)   SpO2 97%   BMI 27 23 kg/m²          Physical Exam  Vitals reviewed  Constitutional:       General: He is not in acute distress (Uncomfortable due to his arthritic pain  )  Appearance: Normal appearance  He is normal weight  He is not ill-appearing, toxic-appearing or diaphoretic  HENT:      Head: Normocephalic and atraumatic  Right Ear: External ear normal       Left Ear: External ear normal       Nose: Nose normal    Eyes:      General: No scleral icterus  Conjunctiva/sclera: Conjunctivae normal       Pupils: Pupils are equal, round, and reactive to light  Cardiovascular:      Rate and Rhythm: Normal rate and regular rhythm  Pulses: Normal pulses  Heart sounds: Normal heart sounds  No murmur heard  Pulmonary:      Effort: Pulmonary effort is normal  No respiratory distress  Breath sounds: Normal breath sounds  Abdominal:      General: Abdomen is flat  There is no distension  Tenderness: There is no abdominal tenderness  Musculoskeletal:      Cervical back: Neck supple  Right lower leg: No edema (Trace)  Left lower leg: No edema (Trace)  Lymphadenopathy:      Cervical: No cervical adenopathy  Skin:     General: Skin is warm  Coloration: Skin is not jaundiced  Findings: No bruising, erythema or rash  Neurological:      General: No focal deficit present  Mental Status: He is alert and oriented to person, place, and time  Mental status is at baseline     Psychiatric:         Mood and Affect: Mood normal          Behavior: Behavior normal

## 2022-06-21 NOTE — ASSESSMENT & PLAN NOTE
Peripheral edema is much improved but at the expense of his renal function in combination with an acute gastroenteritis  He is mildly hypotensive and orthostatic at times    We will discontinue Zestril and we will reduce his torsemide and potassium to  every 48 hours

## 2022-06-21 NOTE — PROGRESS NOTES
Large joint arthrocentesis: L hip joint  Universal Protocol:  Consent: Verbal consent obtained  Risks and benefits: risks, benefits and alternatives were discussed  Consent given by: patient  Time out: Immediately prior to procedure a "time out" was called to verify the correct patient, procedure, equipment, support staff and site/side marked as required  Timeout called at: 6/21/2022 2:45 PM   Patient understanding: patient states understanding of the procedure being performed  Patient consent: the patient's understanding of the procedure matches consent given  Site marked: the operative site was marked  Radiology Images displayed and confirmed   If images not available, report reviewed: imaging studies available  Patient identity confirmed: verbally with patient    Supporting Documentation  Indications: pain   Procedure Details  Location: hip - L hip joint  Preparation: Patient was prepped and draped in the usual sterile fashion  Needle size: 22 G  Ultrasound guidance: no  Approach: lateral  Medications administered: 1 mL lidocaine (PF) 1 %; 1 mL methylPREDNISolone acetate 40 mg/mL    Aspirate amount: 0 mL    Patient tolerance: patient tolerated the procedure well with no immediate complications

## 2022-07-11 DIAGNOSIS — I12.9 BENIGN HYPERTENSION WITH CHRONIC KIDNEY DISEASE: ICD-10-CM

## 2022-07-11 RX ORDER — POTASSIUM CHLORIDE 750 MG/1
10 TABLET, EXTENDED RELEASE ORAL
Qty: 90 TABLET | Refills: 1 | Status: SHIPPED | OUTPATIENT
Start: 2022-07-11 | End: 2022-08-02 | Stop reason: SDUPTHER

## 2022-07-13 LAB
BUN SERPL-MCNC: 28 MG/DL (ref 7–25)
BUN/CREAT SERPL: 16 (CALC) (ref 6–22)
CALCIUM SERPL-MCNC: 9.4 MG/DL (ref 8.6–10.3)
CHLORIDE SERPL-SCNC: 105 MMOL/L (ref 98–110)
CO2 SERPL-SCNC: 28 MMOL/L (ref 20–32)
CREAT SERPL-MCNC: 1.7 MG/DL (ref 0.7–1.28)
GFR/BSA.PRED SERPLBLD CYS-BASED-ARV: 41 ML/MIN/1.73M2
GLUCOSE SERPL-MCNC: 93 MG/DL (ref 65–99)
MAGNESIUM SERPL-MCNC: 2 MG/DL (ref 1.5–2.5)
POTASSIUM SERPL-SCNC: 4.2 MMOL/L (ref 3.5–5.3)
SODIUM SERPL-SCNC: 141 MMOL/L (ref 135–146)

## 2022-07-18 DIAGNOSIS — M16.9 OSTEOARTHRITIS OF HIP, UNSPECIFIED LATERALITY, UNSPECIFIED OSTEOARTHRITIS TYPE: ICD-10-CM

## 2022-07-18 DIAGNOSIS — M16.12 PRIMARY OSTEOARTHRITIS OF LEFT HIP: ICD-10-CM

## 2022-07-18 RX ORDER — TRAMADOL HYDROCHLORIDE 50 MG/1
50 TABLET ORAL EVERY 6 HOURS PRN
Qty: 120 TABLET | Refills: 0 | Status: SHIPPED | OUTPATIENT
Start: 2022-07-18 | End: 2022-08-29 | Stop reason: SDUPTHER

## 2022-08-02 ENCOUNTER — OFFICE VISIT (OUTPATIENT)
Dept: INTERNAL MEDICINE CLINIC | Facility: CLINIC | Age: 77
End: 2022-08-02
Payer: MEDICARE

## 2022-08-02 VITALS
SYSTOLIC BLOOD PRESSURE: 106 MMHG | DIASTOLIC BLOOD PRESSURE: 64 MMHG | HEIGHT: 66 IN | WEIGHT: 175.6 LBS | BODY MASS INDEX: 28.22 KG/M2 | OXYGEN SATURATION: 98 % | TEMPERATURE: 98.3 F | HEART RATE: 84 BPM

## 2022-08-02 DIAGNOSIS — M16.12 PRIMARY OSTEOARTHRITIS OF LEFT HIP: Primary | ICD-10-CM

## 2022-08-02 DIAGNOSIS — F11.20 CONTINUOUS OPIOID DEPENDENCE (HCC): ICD-10-CM

## 2022-08-02 DIAGNOSIS — N18.32 STAGE 3B CHRONIC KIDNEY DISEASE (HCC): ICD-10-CM

## 2022-08-02 DIAGNOSIS — R60.9 PERIPHERAL EDEMA: ICD-10-CM

## 2022-08-02 DIAGNOSIS — I12.9 BENIGN HYPERTENSION WITH CHRONIC KIDNEY DISEASE: ICD-10-CM

## 2022-08-02 DIAGNOSIS — N17.9 AKI (ACUTE KIDNEY INJURY) (HCC): ICD-10-CM

## 2022-08-02 PROBLEM — J12.82 PNEUMONIA DUE TO COVID-19 VIRUS: Status: RESOLVED | Noted: 2021-01-25 | Resolved: 2022-08-02

## 2022-08-02 PROBLEM — U07.1 PNEUMONIA DUE TO COVID-19 VIRUS: Status: RESOLVED | Noted: 2021-01-25 | Resolved: 2022-08-02

## 2022-08-02 PROCEDURE — 99214 OFFICE O/P EST MOD 30 MIN: CPT | Performed by: INTERNAL MEDICINE

## 2022-08-02 RX ORDER — TORSEMIDE 10 MG/1
20 TABLET ORAL
Qty: 90 TABLET | Refills: 1
Start: 2022-08-02 | End: 2022-08-04 | Stop reason: SDUPTHER

## 2022-08-02 RX ORDER — POTASSIUM CHLORIDE 750 MG/1
10 TABLET, EXTENDED RELEASE ORAL DAILY
Qty: 90 TABLET | Refills: 1 | Status: SHIPPED | OUTPATIENT
Start: 2022-08-02

## 2022-08-02 NOTE — ASSESSMENT & PLAN NOTE
Lab Results   Component Value Date    EGFR 41 (L) 07/13/2022    EGFR 36 08/05/2019    CREATININE 1 70 (H) 07/13/2022    CREATININE 2 29 (H) 06/14/2022    CREATININE 1 70 (H) 04/04/2022   Renal function improved from previous  Acute kidney injury resolved

## 2022-08-02 NOTE — ASSESSMENT & PLAN NOTE
Patient states that his pain is livable    He does get some relief with tramadol 50 mg every 6 hours  He does not wish to consider surgery at this time

## 2022-08-02 NOTE — PROGRESS NOTES
Assessment/Plan:    JEN (acute kidney injury) ([facility] [address] )  This has resolved  Renal function has improved since decreasing his diuretic dose  Unfortunately, peripheral edema has worsened  Continuous opioid dependence ([facility] [address] )  Continuing with tramadol 50 mg q 6 hours as needed for his severe left hip arthritis  Peripheral edema  Bilateral peripheral edema left greater than right  Will obtain a venous Doppler of his left lower extremity to rule out DVT    Primary osteoarthritis of left hip  Patient states that his pain is livable    He does get some relief with tramadol 50 mg every 6 hours  He does not wish to consider surgery at this time  Stage 3b chronic kidney disease [facility])  Lab Results   Component Value Date    EGFR 41 (L) 07/13/2022    EGFR 36 08/05/2019    CREATININE 1 70 (H) 07/13/2022    CREATININE 2 29 (H) 06/14/2022    CREATININE 1 70 (H) 04/04/2022   Renal function improved from previous  Acute kidney injury resolved  Diagnoses and all orders for this visit:    Primary osteoarthritis of left hip    Benign hypertension with chronic kidney disease  -     potassium chloride (Klor-Con M10) 10 mEq tablet; Take 1 tablet (10 mEq total) by mouth daily  -     CBC and Platelet; Future  -     Comprehensive metabolic panel; Future    Continuous opioid dependence (HCC)    Peripheral edema  -     torsemide (DEMADEX) 10 mg tablet; Take 2 tablets (20 mg total) by mouth every other day  -     VAS lower limb venous duplex study, unilateral/limited; Future    EJN (acute kidney injury) ([facility] [address] )  -     CBC and Platelet; Future  -     Comprehensive metabolic panel; Future    Stage 3b chronic kidney disease (HCC)  -     CBC and Platelet; Future  -     Comprehensive metabolic panel; Future          Subjective:      Patient ID: Mk Blum is a 69 y o  male  Patient presents to the office for follow-up visit  Most recent renal function study showed a significant acute kidney injury with diuretic use    While it was helpful in controlling his edema it was not good for his kidney health  Diuretics were decreased alternate days  His renal function has now improved significantly to an EGFR 41  Serum creatinine improved as well to 1 70  Appetite is good  Patient continues to complain of left hip pain  He also has significant edema of his left lower extremity  He denies any calf pain  He has milder edema in his right lower extremity  He denies any shortness of breath, chest pain, palpitations, orthopnea or PND        Family History   Problem Relation Age of Onset    Breast cancer Mother     Coronary artery disease Father      Social History     Socioeconomic History    Marital status: Significant Other     Spouse name: Not on file    Number of children: Not on file    Years of education: Not on file    Highest education level: Not on file   Occupational History    Not on file   Tobacco Use    Smoking status: Former Smoker     Types: Pipe     Quit date: 2021     Years since quittin 4    Smokeless tobacco: Never Used   Vaping Use    Vaping Use: Never used   Substance and Sexual Activity    Alcohol use: No    Drug use: No    Sexual activity: Not on file   Other Topics Concern    Not on file   Social History Narrative    Not on file     Social Determinants of Health     Financial Resource Strain: Not on file   Food Insecurity: Not on file   Transportation Needs: Not on file   Physical Activity: Not on file   Stress: Not on file   Social Connections: Not on file   Intimate Partner Violence: Not on file   Housing Stability: Not on file     Past Medical History:   Diagnosis Date    Benign hypertension with chronic kidney disease, stage III ([facility] [address] ) 2016    Bilateral pneumothoraces 2021    Colon polyps     Degenerative arthritis of hip 3/2/2015    Gout     Impacted cerumen of both ears     last assessed-2015    Pneumonia due to COVID-19 virus 2021    Pressure [facility] of sac[facility] region[facility] stage 3 ([facility] ) 2/3/2021    Renal calculi     last assessed-9/22/2014    Respiratory failure ([facility] ) 12/29/2020    Urinary problem     last assessed-9/22/2014    Uveitis     last assessed-2/22/2016       Current Outpatient Medications:     acetaminophen (TYLENOL) 500 mg tablet, Take 1 tablet (500 mg total) by mouth every 6 (six) hours as needed for mild pain, Disp: 120 tablet, Rfl: 2    allopurinol (ZYLOPRIM) 300 mg tablet, Take 1 tablet (300 mg total) by mouth daily, Disp: 90 tablet, Rfl: 1    potassium chloride (Klor-Con M10) 10 mEq tablet, Take 1 tablet (10 mEq total) by mouth daily, Disp: 90 tablet, Rfl: 1    torsemide (DEMADEX) 10 mg tablet, Take 2 tablets (20 mg total) by mouth every other day, Disp: 90 tablet, Rfl: 1    traMADol (Ultram) 50 mg tablet, Take 1 tablet (50 mg total) by mouth every 6 (six) hours as needed for moderate pain, Disp: 120 tablet, Rfl: 0  No Known Allergies  Past Surgical History:   Procedure Laterality Date    CATARACT EXTRACTION Right     [place] COLONOSCOPY FLX DX W/COLLJ SPEC WHEN PFRMD N/A 10/8/2018    Procedure: COLONOSCOPY;  Surgeon: Uriel Page MD;  Location: [facility]  GI LAB; Service: Gastroenterology    TONSILLECTOMY      last assessed-9/22/2014         Review of Systems   Constitutional: Negative  HENT: Negative  Eyes: Negative  Respiratory: Negative  Cardiovascular: Positive for leg swelling (Bilateral peripheral edema left greater than right)  Endocrine: Negative  Genitourinary: Negative  Musculoskeletal: Positive for arthralgias (Left hip)  Skin: Negative  Allergic/Immunologic: Negative  Neurological: Negative  Hematological: Negative  Psychiatric/Behavioral: Negative            Objective:      /64 (BP Location: Left arm, Patient Position: Sitting, Cuff Size: Standard)   Pulse 84   Temp 98 3 °F (36 8 °C) (Tympanic)   Ht 5' 6" (1 676 m)   Wt 79 7 kg (175 lb 9 6 oz)   SpO2 98%   BMI 28 34 kg/m²          Physical Exam  Vitals reviewed  Constitutional:       General: He is not in acute distress  Appearance: Normal appearance  He is not ill-appearing, toxic-appearing or diaphoretic  HENT:      Head: Normocephalic and atraumatic  Right Ear: External ear normal       Left Ear: External ear normal    Eyes:      General: No scleral icterus  Conjunctiva/sclera: Conjunctivae normal       Pupils: Pupils are equal, round, and reactive to light  Cardiovascular:      Rate and Rhythm: Normal rate and regular rhythm  Heart sounds: Normal heart sounds  No murmur heard  Pulmonary:      Effort: Pulmonary effort is normal  No respiratory distress  Breath sounds: Normal breath sounds  Abdominal:      General: Abdomen is flat  There is no distension  Tenderness: There is no abdominal tenderness  Musculoskeletal:      Cervical back: Neck supple  Right lower leg: No edema  Left lower leg: No edema  Skin:     General: Skin is warm  Coloration: Skin is not jaundiced  Findings: No bruising, erythema or rash  Neurological:      General: No focal deficit present  Mental Status: He is alert and oriented to person, place, and time  Mental status is at baseline     Psychiatric:         Mood and Affect: Mood normal          Behavior: Behavior normal

## 2022-08-02 NOTE — ASSESSMENT & PLAN NOTE
This has resolved  Renal function has improved since decreasing his diuretic dose  Unfortunately, peripheral edema has worsened

## 2022-08-02 NOTE — ASSESSMENT & PLAN NOTE
Bilateral peripheral edema left greater than right    Will obtain a venous Doppler of his left lower extremity to rule out DVT

## 2022-08-04 DIAGNOSIS — R60.9 PERIPHERAL EDEMA: ICD-10-CM

## 2022-08-04 RX ORDER — TORSEMIDE 10 MG/1
20 TABLET ORAL
Qty: 90 TABLET | Refills: 1 | Status: SHIPPED | OUTPATIENT
Start: 2022-08-04 | End: 2023-01-31

## 2022-08-25 ENCOUNTER — HOSPITAL ENCOUNTER (OUTPATIENT)
Dept: NON INVASIVE DIAGNOSTICS | Facility: CLINIC | Age: 77
Discharge: HOME | End: 2022-08-25
Payer: MEDICARE

## 2022-08-25 DIAGNOSIS — M16.12 PRIMARY OSTEOARTHRITIS OF LEFT HIP: ICD-10-CM

## 2022-08-25 DIAGNOSIS — M10.9 GOUT, ARTHROPATHY: ICD-10-CM

## 2022-08-25 DIAGNOSIS — R60.9 PERIPHERAL EDEMA: ICD-10-CM

## 2022-08-25 DIAGNOSIS — M16.9 OSTEOARTHRITIS OF HIP, UNSPECIFIED LATERALITY, UNSPECIFIED OSTEOARTHRITIS TYPE: ICD-10-CM

## 2022-08-25 PROCEDURE — 93971 EXTREMITY STUDY: CPT | Performed by: SURGERY

## 2022-08-25 PROCEDURE — 93971 EXTREMITY STUDY: CPT

## 2022-08-25 RX ORDER — ALLOPURINOL 300 MG/1
300 TABLET ORAL DAILY
Qty: 90 TABLET | Refills: 1 | Status: SHIPPED | OUTPATIENT
Start: 2022-08-25

## 2022-08-29 RX ORDER — TRAMADOL HYDROCHLORIDE 50 MG/1
50 TABLET ORAL EVERY 6 HOURS PRN
Qty: 120 TABLET | Refills: 0 | Status: SHIPPED | OUTPATIENT
Start: 2022-08-29 | End: 2022-09-27 | Stop reason: SDUPTHER

## 2022-09-16 ENCOUNTER — TELEPHONE (OUTPATIENT)
Dept: INTERNAL MEDICINE CLINIC | Facility: CLINIC | Age: 77
End: 2022-09-16

## 2022-09-22 ENCOUNTER — TELEPHONE (OUTPATIENT)
Dept: INTERNAL MEDICINE CLINIC | Facility: OTHER | Age: 77
End: 2022-09-22

## 2022-09-22 NOTE — TELEPHONE ENCOUNTER
I schedule the TCM's the clerical staff schedules ED follow ups. I copy you because you don't get notified of ED visits from [facility].  If you want something special please let me know and I'll take care of it

## 2022-09-27 ENCOUNTER — OFFICE VISIT (OUTPATIENT)
Dept: INTERNAL MEDICINE CLINIC | Facility: CLINIC | Age: 77
End: 2022-09-27
Payer: MEDICARE

## 2022-09-27 VITALS
HEIGHT: 66 IN | HEART RATE: 77 BPM | OXYGEN SATURATION: 98 % | DIASTOLIC BLOOD PRESSURE: 68 MMHG | TEMPERATURE: 98.5 F | WEIGHT: 177.4 LBS | SYSTOLIC BLOOD PRESSURE: 126 MMHG | BODY MASS INDEX: 28.51 KG/M2

## 2022-09-27 DIAGNOSIS — R60.9 PERIPHERAL EDEMA: ICD-10-CM

## 2022-09-27 DIAGNOSIS — M16.9 OSTEOARTHRITIS OF HIP, UNSPECIFIED LATERALITY, UNSPECIFIED OSTEOARTHRITIS TYPE: ICD-10-CM

## 2022-09-27 DIAGNOSIS — N18.4 STAGE 4 CHRONIC KIDNEY DISEASE (HCC): ICD-10-CM

## 2022-09-27 DIAGNOSIS — Z23 NEED FOR INFLUENZA VACCINATION: ICD-10-CM

## 2022-09-27 DIAGNOSIS — S81.812D: ICD-10-CM

## 2022-09-27 DIAGNOSIS — S81.812D SKIN TEAR OF LOWER LEG WITHOUT COMPLICATION, LEFT, SUBSEQUENT ENCOUNTER: Primary | ICD-10-CM

## 2022-09-27 DIAGNOSIS — M16.12 PRIMARY OSTEOARTHRITIS OF LEFT HIP: ICD-10-CM

## 2022-09-27 PROCEDURE — G0008 ADMIN INFLUENZA VIRUS VAC: HCPCS

## 2022-09-27 PROCEDURE — 99213 OFFICE O/P EST LOW 20 MIN: CPT | Performed by: INTERNAL MEDICINE

## 2022-09-27 PROCEDURE — 90662 IIV NO PRSV INCREASED AG IM: CPT

## 2022-09-27 RX ORDER — TRAMADOL HYDROCHLORIDE 50 MG/1
50 TABLET ORAL EVERY 6 HOURS PRN
Qty: 120 TABLET | Refills: 0 | Status: SHIPPED | OUTPATIENT
Start: 2022-09-27

## 2022-09-27 NOTE — ASSESSMENT & PLAN NOTE
Lab Results   Component Value Date    EGFR 41 (L) 07/13/2022    EGFR 36 08/05/2019    CREATININE 1 70 (H) 07/13/2022    CREATININE 2 29 (H) 06/14/2022    CREATININE 1 70 (H) 04/04/2022   Renal function has once again declined with diuretic therapy    Will need to decrease his diuretic dosing

## 2022-09-27 NOTE — ASSESSMENT & PLAN NOTE
Wound was redressed  Mupirocin ointment was applied  No evidence of infection    Will follow-up in 1 week

## 2022-09-27 NOTE — LETTER
September 27, 2022     Patient: Marybel Jenkins  YOB: 1945  Date of Visit: 9/27/2022      To Whom it May Concern:    Frank De Los Santos is under my professional care  Ann Rea was seen in my office on 9/27/2022  Ann Rea may return to work on 9/27/2022  If you have any questions or concerns, please don't hesitate to call           Sincerely,          Mj Rosen MD        CC: No Recipients

## 2022-09-27 NOTE — PROGRESS NOTES
Name: Radha Dietz      : 1945      MRN: [id number]  Encounter Provider: Fide Meadows MD  Encounter Date: 2022   Encounter department: [address] [place] [address]     1  Skin tear of lower leg without complication, left, subsequent encounter  -     mupirocin (BACTROBAN) 2 % ointment; Apply topically daily    2  Osteoarthritis of hip, unspecified laterality, unspecified osteoarthritis type  Assessment & Plan: Tolerable with use of Tylenol and tramadol  Orders:  -     traMADol (Ultram) 50 mg tablet; Take 1 tablet (50 mg total) by mouth every 6 (six) hours as needed for moderate pain    3  Primary osteoarthritis of left hip  Assessment & Plan: Tolerable with use of Tylenol and tramadol  Orders:  -     traMADol (Ultram) 50 mg tablet; Take 1 tablet (50 mg total) by mouth every 6 (six) hours as needed for moderate pain    4  Need for influenza vaccination  -     influenza vaccine, high-dose, PF 0 7 mL (FLUZONE HIGH-DOSE)    5  Peripheral edema  Assessment & Plan:  Significantly improved peripheral edema with leg elevation  6  Stage 4 chronic kidney disease ([facility] [address] )  Assessment & Plan:  Lab Results   Component Value Date    EGFR 41 (L) 2022    EGFR 36 2019    CREATININE 1 70 (H) 2022    CREATININE 2 29 (H) 2022    CREATININE 1 70 (H) 2022   Renal function has once again declined with diuretic therapy  Will need to decrease his diuretic dosing      7  Noninfected skin tear of lower extremity, left, subsequent encounter  Assessment & Plan:  Wound was redressed  Mupirocin ointment was applied  No evidence of infection  Will follow-up in 1 week           Subjective      Presents for follow-up visit  He was seen at urgent care 1 week ago and referred to the emergency room because of a skin tear of his left anterior tibial area a Steri-Strips dressing along with glue was applied    The patient experienced some significant weeping edema of his left lower extremity at the time  He was instructed to maintain his left leg elevated which he did and has experienced significant improvement in his peripheral edema  The wound looks clean without erythema  There is no purulent drainage  There is no surrounding swelling  There is no weeping edema  Review of Systems   Constitutional: Negative  HENT: Negative  Eyes: Negative  Respiratory: Negative  Cardiovascular: Negative  Gastrointestinal: Negative  Endocrine: Negative  Genitourinary: Negative  Musculoskeletal: Positive for arthralgias (Left hip pain)  Skin: Positive for wound (Anterior tibial skin tear left lower leg)  Neurological: Negative  Hematological: Negative  Psychiatric/Behavioral: Negative  Current Outpatient Medications on File Prior to Visit   Medication Sig    acetaminophen (TYLENOL) 500 mg tablet Take 1 tablet (500 mg total) by mouth every 6 (six) hours as needed for mild pain    allopurinol (ZYLOPRIM) 300 mg tablet Take 1 tablet (300 mg total) by mouth daily    potassium chloride (Klor-Con M10) 10 mEq tablet Take 1 tablet (10 mEq total) by mouth daily (Patient taking differently: Take 10 mEq by mouth every other day)    torsemide (DEMADEX) 10 mg tablet Take 2 tablets (20 mg total) by mouth every other day    [DISCONTINUED] traMADol (Ultram) 50 mg tablet Take 1 tablet (50 mg total) by mouth every 6 (six) hours as needed for moderate pain       Objective     /68 (BP Location: Left arm, Patient Position: Sitting, Cuff Size: Standard)   Pulse 77   Temp 98 5 °F (36 9 °C) (Tympanic)   Ht 5' 6" (1 676 m)   Wt 80 5 kg (177 lb 6 4 oz)   SpO2 98%   BMI 28 63 kg/m²     Physical Exam  Vitals reviewed  Constitutional:       General: He is not in acute distress  Appearance: Normal appearance  He is normal weight  He is not ill-appearing, toxic-appearing or diaphoretic     HENT:      Head: Normocephalic and atraumatic  Right Ear: External ear normal       Left Ear: External ear normal       Nose: Nose normal    Eyes:      General: No scleral icterus  Conjunctiva/sclera: Conjunctivae normal       Pupils: Pupils are equal, round, and reactive to light  Neck:      Vascular: No carotid bruit or JVD  Trachea: No tracheal deviation  Cardiovascular:      Rate and Rhythm: Normal rate and regular rhythm  Pulses: Normal pulses  Heart sounds: Normal heart sounds  No murmur heard  Pulmonary:      Effort: Pulmonary effort is normal  No respiratory distress  Breath sounds: Normal breath sounds  No rales  Abdominal:      General: Abdomen is flat  There is no distension  Musculoskeletal:         General: Signs of injury (Left anterior tibial skin tear dressing applied) present  No swelling  Cervical back: Neck supple  Right lower leg: No edema (1+)  Left lower leg: No edema (1+-2+)  Skin:     General: Skin is warm  Coloration: Skin is not jaundiced  Findings: No bruising, erythema or rash  Neurological:      General: No focal deficit present  Mental Status: He is alert and oriented to person, place, and time  Mental status is at baseline     Psychiatric:         Mood and Affect: Mood normal          Behavior: Behavior normal        Jigna Quijano MD

## 2022-10-04 ENCOUNTER — OFFICE VISIT (OUTPATIENT)
Dept: INTERNAL MEDICINE CLINIC | Facility: CLINIC | Age: 77
End: 2022-10-04
Payer: MEDICARE

## 2022-10-04 VITALS
SYSTOLIC BLOOD PRESSURE: 128 MMHG | HEART RATE: 96 BPM | BODY MASS INDEX: 30.08 KG/M2 | TEMPERATURE: 97.2 F | WEIGHT: 187.2 LBS | OXYGEN SATURATION: 98 % | HEIGHT: 66 IN | DIASTOLIC BLOOD PRESSURE: 76 MMHG

## 2022-10-04 DIAGNOSIS — S81.812D: Primary | ICD-10-CM

## 2022-10-04 DIAGNOSIS — R60.9 PERIPHERAL EDEMA: ICD-10-CM

## 2022-10-04 PROCEDURE — 99213 OFFICE O/P EST LOW 20 MIN: CPT | Performed by: INTERNAL MEDICINE

## 2022-10-04 RX ORDER — NON-ADHERENT BANDAGE 6" X 6"
1 BANDAGE TOPICAL DAILY
Qty: 25 EACH | Refills: 1 | Status: SHIPPED | OUTPATIENT
Start: 2022-10-04

## 2022-10-04 NOTE — ASSESSMENT & PLAN NOTE
Wound appears stable, progressive healing  Continue with daily dressing changes in application mupirocin ointment  Follow-up in 2 weeks  Patient to call if any change in condition, increased pain or increased redness or onset of fever

## 2022-10-04 NOTE — PROGRESS NOTES
Name: Mine Dooley      : 1945      MRN: [id number]  Encounter Provider: Karishma Lam MD  Encounter Date: 10/4/2022   Encounter department: [address] [place] [address]     1  Noninfected skin tear of lower extremity, left, subsequent encounter  Assessment & Plan:  Wound appears stable, progressive healing  Continue with daily dressing changes in application mupirocin ointment  Follow-up in 2 weeks  Patient to call if any change in condition, increased pain or increased redness or onset of fever  Orders:  -     Wound Dressings ([address] 6"x6") PADS; Apply 1 each topically daily    2  Peripheral edema  Assessment & Plan:  Chronic edema both lower extremities left greater than right  Continues on alternate day torsemide             Subjective      Patient is seen for follow-up for noninfected skin tear of his left anterior shin  Dressing removed  Steri-Strips dressing was also removed  Wound was cleaned with normal saline  Devitalized tissue was removed  Mupirocin dressing was applied and covered with an island dressing  Will follow-up in 2 weeks  Patient instructed to contact the office should he experience any increase in redness, pain or swelling, fever or chills  Review of Systems   Constitutional: Negative  Negative for chills and fever  HENT: Negative  Eyes: Negative  Respiratory: Negative  Cardiovascular: Positive for leg swelling (Chronic left lower extremity edema)  Gastrointestinal: Negative  Endocrine: Negative  Genitourinary: Negative  Musculoskeletal: Positive for arthralgias (Left hip)         Current Outpatient Medications on File Prior to Visit   Medication Sig    acetaminophen (TYLENOL) 500 mg tablet Take 1 tablet (500 mg total) by mouth every 6 (six) hours as needed for mild pain    allopurinol (ZYLOPRIM) 300 mg tablet Take 1 tablet (300 mg total) by mouth daily    mupirocin (BACTROBAN) 2 % ointment Apply topically daily    potassium chloride (Klor-Con M10) 10 mEq tablet Take 1 tablet (10 mEq total) by mouth daily (Patient taking differently: Take 10 mEq by mouth every other day)    torsemide (DEMADEX) 10 mg tablet Take 2 tablets (20 mg total) by mouth every other day    traMADol (Ultram) 50 mg tablet Take 1 tablet (50 mg total) by mouth every 6 (six) hours as needed for moderate pain       Objective     /76 (BP Location: Left arm, Patient Position: Sitting, Cuff Size: Standard)   Pulse 96   Temp (!) 97 2 °F (36 2 °C) (Tympanic)   Ht 5' 5 75" (1 67 m)   Wt 84 9 kg (187 lb 3 2 oz)   SpO2 98%   BMI 30 45 kg/m²     Physical Exam  Vitals reviewed  Constitutional:       General: He is not in acute distress  Appearance: Normal appearance  He is not ill-appearing, toxic-appearing or diaphoretic  HENT:      Head: Normocephalic and atraumatic  Right Ear: External ear normal       Left Ear: External ear normal    Eyes:      General: No scleral icterus  Pupils: Pupils are equal, round, and reactive to light  Cardiovascular:      Rate and Rhythm: Normal rate and regular rhythm  Heart sounds: Normal heart sounds  Pulmonary:      Effort: Pulmonary effort is normal  No respiratory distress  Abdominal:      General: Abdomen is flat  There is no distension  Musculoskeletal:         General: No swelling or tenderness (Left hip when ambulating)  Cervical back: Neck supple  Right lower leg: No edema  Left lower leg: Edema (2+ left lower extremity edema) present  Skin:     Findings: Lesion (Left anterior tibial wound cleansed with normal saline  Mupirocin applied and covered with a dressing) present  Neurological:      General: No focal deficit present  Mental Status: He is alert and oriented to person, place, and time  Mental status is at baseline     Psychiatric:         Mood and Affect: Mood normal          Behavior: Behavior normal  Ines Main MD

## 2022-10-04 NOTE — ASSESSMENT & PLAN NOTE
Chronic edema both lower extremities left greater than right    Continues on alternate day torsemide

## 2022-10-10 ENCOUNTER — TELEPHONE (OUTPATIENT)
Dept: INTERNAL MEDICINE CLINIC | Facility: CLINIC | Age: 77
End: 2022-10-10

## 2022-10-10 NOTE — TELEPHONE ENCOUNTER
Patient's EC called stating that due to patient's condition, he is unable to drive and she is driving him  Patient has an appt on 10/20 at 3:30pm but EC works  Was wondering if they can be squeezed in any earlier in the day/afternoon

## 2022-10-11 PROBLEM — K52.9 ACUTE GASTROENTERITIS: Status: RESOLVED | Noted: 2022-06-21 | Resolved: 2022-10-11

## 2022-10-12 PROBLEM — R05.9 COUGH: Status: RESOLVED | Noted: 2021-04-02 | Resolved: 2022-10-12

## 2022-10-20 ENCOUNTER — OFFICE VISIT (OUTPATIENT)
Dept: INTERNAL MEDICINE CLINIC | Facility: CLINIC | Age: 77
End: 2022-10-20
Payer: MEDICARE

## 2022-10-20 VITALS
SYSTOLIC BLOOD PRESSURE: 117 MMHG | WEIGHT: 164.2 LBS | OXYGEN SATURATION: 98 % | HEART RATE: 68 BPM | BODY MASS INDEX: 26.39 KG/M2 | TEMPERATURE: 97 F | HEIGHT: 66 IN | DIASTOLIC BLOOD PRESSURE: 66 MMHG

## 2022-10-20 DIAGNOSIS — R33.9 URINARY RETENTION: ICD-10-CM

## 2022-10-20 DIAGNOSIS — N17.9 AKI (ACUTE KIDNEY INJURY) (HCC): ICD-10-CM

## 2022-10-20 DIAGNOSIS — R60.9 PERIPHERAL EDEMA: ICD-10-CM

## 2022-10-20 DIAGNOSIS — N18.4 STAGE 4 CHRONIC KIDNEY DISEASE (HCC): Primary | ICD-10-CM

## 2022-10-20 DIAGNOSIS — S81.812D: ICD-10-CM

## 2022-10-20 PROCEDURE — 99213 OFFICE O/P EST LOW 20 MIN: CPT | Performed by: INTERNAL MEDICINE

## 2022-10-20 NOTE — PROGRESS NOTES
Name: Luis Angel Leach      : 1945      MRN: [id number]  Encounter Provider: Claudia Jarquin MD  Encounter Date: 10/20/2022   Encounter department: [address] [place] [address]     1  Stage 4 chronic kidney disease [facility])  Assessment & Plan:  Lab Results   Component Value Date    EGFR 41 (L) 2022    EGFR 36 2019    CREATININE 1 70 (H) 2022    CREATININE 2 29 (H) 2022    CREATININE 1 70 (H) 2022   Follow-up labs to evaluate off diuretics    Orders:  -     Basic metabolic panel; Future  -     CBC and differential; Future    2  Urinary retention  Assessment & Plan:  Currently with Nolasco catheter to bag drainage has follow-up scheduled with Urology    Orders:  -     Basic metabolic panel; Future  -     CBC and differential; Future    3  JEN (acute kidney injury) [facility])  Assessment & Plan:  Follow-up labs  Continue Nolasco drainage  Continue without diuretics    Orders:  -     Basic metabolic panel; Future  -     CBC and differential; Future    4  Noninfected skin tear of lower extremity, left, subsequent encounter  Assessment & Plan:  Wound is nicely healed  Good granulation tissue  No further dressing changes are necessary  5  Peripheral edema  Assessment & Plan:  Continue bedrest leg elevation as tolerated           Subjective      Patient presents to the office for follow-up for his lower extremity wound  Wound appears to be well healed now  There is good eschar  There is no drainage  Edema has improved dramatically with bed rest and leg elevation  Recommended to the patient that he obtain some venous compression stockings at 20 to 30 mm Hg  He continues with Nolasco catheter drainage following his hospitalization for COVID associated urinary retention with  Unclear what Urology has planned for the patient    Would think he would be a reasonable candidate for TURP or possibly UroLift if there is not much involvement of his prostate intruding into the bladder  Patient feels well  No fevers or chills  He is experiencing some increased pain in his arthritic hip due to his immobility    Review of Systems   Constitutional: Positive for activity change (Limited due to his arthritis and the indwelling Nolasco)  Negative for appetite change, chills, diaphoresis, fatigue and fever  HENT: Negative  Eyes: Negative  Respiratory: Negative  Cardiovascular: Negative  Gastrointestinal: Negative  Endocrine: Negative  Genitourinary:        Indwelling Nolasco catheter   Musculoskeletal: Positive for arthralgias (Severe left hip pain)  Skin: Positive for wound (Healing nicely  Good granulation tissue  No further dressing changes are necessary  )  Allergic/Immunologic: Negative  Neurological: Negative  Hematological: Negative  Psychiatric/Behavioral: Negative          Current Outpatient Medications on File Prior to Visit   Medication Sig   • acetaminophen (TYLENOL) 500 mg tablet Take 1 tablet (500 mg total) by mouth every 6 (six) hours as needed for mild pain   • allopurinol (ZYLOPRIM) 300 mg tablet Take 1 tablet (300 mg total) by mouth daily   • mupirocin (BACTROBAN) 2 % ointment Apply topically daily   • traMADol (Ultram) 50 mg tablet Take 1 tablet (50 mg total) by mouth every 6 (six) hours as needed for moderate pain   • potassium chloride (Klor-Con M10) 10 mEq tablet Take 1 tablet (10 mEq total) by mouth daily (Patient not taking: Reported on 10/20/2022)   • torsemide (DEMADEX) 10 mg tablet Take 2 tablets (20 mg total) by mouth every other day (Patient not taking: Reported on 10/20/2022)   • Wound Dressings UP [facility] [place] Dressing 6"x6") PADS Apply 1 each topically daily (Patient not taking: Reported on 10/20/2022)       Objective     /66 (BP Location: Left arm, Patient Position: Sitting, Cuff Size: Standard)   Pulse 68   Temp (!) 97 °F (36 1 °C) (Tympanic)   Ht 5' 6" (1 676 m)   Wt 74 5 kg (164 lb 3 2 oz) SpO2 98%   BMI 26 50 kg/m²     Physical Exam  Vitals reviewed  Constitutional:       General: He is not in acute distress  Appearance: Normal appearance  He is normal weight  He is not ill-appearing, toxic-appearing or diaphoretic  HENT:      Head: Normocephalic and atraumatic  Right Ear: External ear normal       Left Ear: External ear normal    Eyes:      General: No scleral icterus  Conjunctiva/sclera: Conjunctivae normal       Pupils: Pupils are equal, round, and reactive to light  Cardiovascular:      Rate and Rhythm: Normal rate and regular rhythm  Pulses: Normal pulses  Heart sounds: Normal heart sounds  No murmur heard  Pulmonary:      Effort: Pulmonary effort is normal  No respiratory distress  Breath sounds: Normal breath sounds  Abdominal:      General: Abdomen is flat  There is no distension  Genitourinary:     Comments: Indwelling Nolasco catheter to bag drainage  Musculoskeletal:         General: No swelling  Cervical back: Neck supple  No rigidity  Right lower leg: No edema  Left lower leg: No edema  Skin:     General: Skin is warm  Capillary Refill: Capillary refill takes less than 2 seconds  Coloration: Skin is not jaundiced  Findings: Lesion (Well-healed anterior tibial wound) present  No bruising, erythema or rash  Neurological:      General: No focal deficit present  Mental Status: He is alert and oriented to person, place, and time  Mental status is at baseline     Psychiatric:         Mood and Affect: Mood normal          Behavior: Behavior normal        Sera Cottrell MD

## 2022-10-20 NOTE — ASSESSMENT & PLAN NOTE
Lab Results   Component Value Date    EGFR 41 (L) 07/13/2022    EGFR 36 08/05/2019    CREATININE 1 70 (H) 07/13/2022    CREATININE 2 29 (H) 06/14/2022    CREATININE 1 70 (H) 04/04/2022   Follow-up labs to evaluate off diuretics

## 2022-11-14 LAB
BASOPHILS # BLD AUTO: 72 CELLS/UL (ref 0–200)
BASOPHILS NFR BLD AUTO: 0.9 %
BUN SERPL-MCNC: 22 MG/DL (ref 7–25)
BUN/CREAT SERPL: 14 (CALC) (ref 6–22)
CALCIUM SERPL-MCNC: 9.8 MG/DL (ref 8.6–10.3)
CHLORIDE SERPL-SCNC: 106 MMOL/L (ref 98–110)
CO2 SERPL-SCNC: 28 MMOL/L (ref 20–32)
CREAT SERPL-MCNC: 1.53 MG/DL (ref 0.7–1.28)
EOSINOPHIL # BLD AUTO: 216 CELLS/UL (ref 15–500)
EOSINOPHIL NFR BLD AUTO: 2.7 %
ERYTHROCYTE [DISTWIDTH] IN BLOOD BY AUTOMATED COUNT: 13.3 % (ref 11–15)
GFR/BSA.PRED SERPLBLD CYS-BASED-ARV: 47 ML/MIN/1.73M2
GLUCOSE SERPL-MCNC: 76 MG/DL (ref 65–99)
HCT VFR BLD AUTO: 39.1 % (ref 38.5–50)
HGB BLD-MCNC: 13.2 G/DL (ref 13.2–17.1)
LYMPHOCYTES # BLD AUTO: 1944 CELLS/UL (ref 850–3900)
LYMPHOCYTES NFR BLD AUTO: 24.3 %
MCH RBC QN AUTO: 30.6 PG (ref 27–33)
MCHC RBC AUTO-ENTMCNC: 33.8 G/DL (ref 32–36)
MCV RBC AUTO: 90.7 FL (ref 80–100)
MONOCYTES # BLD AUTO: 560 CELLS/UL (ref 200–950)
MONOCYTES NFR BLD AUTO: 7 %
NEUTROPHILS # BLD AUTO: 5208 CELLS/UL (ref 1500–7800)
NEUTROPHILS NFR BLD AUTO: 65.1 %
PLATELET # BLD AUTO: 324 THOUSAND/UL (ref 140–400)
PMV BLD REES-ECKER: 9 FL (ref 7.5–12.5)
POTASSIUM SERPL-SCNC: 4.7 MMOL/L (ref 3.5–5.3)
RBC # BLD AUTO: 4.31 MILLION/UL (ref 4.2–5.8)
SODIUM SERPL-SCNC: 141 MMOL/L (ref 135–146)
WBC # BLD AUTO: 8 THOUSAND/UL (ref 3.8–10.8)

## 2022-11-17 ENCOUNTER — OFFICE VISIT (OUTPATIENT)
Dept: INTERNAL MEDICINE CLINIC | Facility: CLINIC | Age: 77
End: 2022-11-17

## 2022-11-17 VITALS
SYSTOLIC BLOOD PRESSURE: 130 MMHG | BODY MASS INDEX: 28.12 KG/M2 | OXYGEN SATURATION: 98 % | DIASTOLIC BLOOD PRESSURE: 70 MMHG | WEIGHT: 175 LBS | HEART RATE: 96 BPM | TEMPERATURE: 98 F | HEIGHT: 66 IN

## 2022-11-17 DIAGNOSIS — R60.9 PERIPHERAL EDEMA: ICD-10-CM

## 2022-11-17 DIAGNOSIS — M16.9 OSTEOARTHRITIS OF HIP, UNSPECIFIED LATERALITY, UNSPECIFIED OSTEOARTHRITIS TYPE: ICD-10-CM

## 2022-11-17 DIAGNOSIS — F11.20 CONTINUOUS OPIOID DEPENDENCE (HCC): ICD-10-CM

## 2022-11-17 DIAGNOSIS — M10.00 IDIOPATHIC GOUT, UNSPECIFIED CHRONICITY, UNSPECIFIED SITE: ICD-10-CM

## 2022-11-17 DIAGNOSIS — N17.9 AKI (ACUTE KIDNEY INJURY) (HCC): ICD-10-CM

## 2022-11-17 DIAGNOSIS — M16.12 PRIMARY OSTEOARTHRITIS OF LEFT HIP: ICD-10-CM

## 2022-11-17 DIAGNOSIS — N18.31 STAGE 3A CHRONIC KIDNEY DISEASE (HCC): Primary | ICD-10-CM

## 2022-11-17 RX ORDER — TRAMADOL HYDROCHLORIDE 50 MG/1
50 TABLET ORAL EVERY 6 HOURS PRN
Qty: 120 TABLET | Refills: 0 | Status: SHIPPED | OUTPATIENT
Start: 2022-11-17

## 2022-11-17 NOTE — ASSESSMENT & PLAN NOTE
Lab Results   Component Value Date    EGFR 47 (L) 11/14/2022    EGFR 41 (L) 07/13/2022    EGFR 36 08/05/2019    CREATININE 1 53 (H) 11/14/2022    CREATININE 1 70 (H) 07/13/2022    CREATININE 2 29 (H) 06/14/2022   Acute kidney injury resolved  Renal function has improved significantly with discontinuation of diuretic therapy    His edema has resolved

## 2022-11-17 NOTE — ASSESSMENT & PLAN NOTE
Continues with the use of tramadol 50 mg every 6 hours p r n  Because of severe left hip osteoarthritis    Patient is in need of a left hip arthroplasty but thus far he is willing to tolerate a discomfort

## 2022-11-17 NOTE — PROGRESS NOTES
Name: Evert Figueroa      : 1945      MRN: [id number]  Encounter Provider: Otto Concepcion MD  Encounter Date: 2022   Encounter department: [address] [place] [address]     1  Stage 3a chronic kidney disease [facility])  Assessment & Plan:  Lab Results   Component Value Date    EGFR 47 (L) 2022    EGFR 41 (L) 2022    EGFR 36 2019    CREATININE 1 53 (H) 2022    CREATININE 1 70 (H) 2022    CREATININE 2 29 (H) 2022   Acute kidney injury resolved  Renal function has improved significantly with discontinuation of diuretic therapy  His edema has resolved    Orders:  -     Basic metabolic panel; Future; Expected date: 2023  -     CBC and differential; Future; Expected date: 2023    2  Osteoarthritis of hip, unspecified laterality, unspecified osteoarthritis type  -     traMADol (Ultram) 50 mg tablet; Take 1 tablet (50 mg total) by mouth every 6 (six) hours as needed for moderate pain  -     Basic metabolic panel; Future; Expected date: 2023  -     CBC and differential; Future; Expected date: 2023    3  Primary osteoarthritis of left hip  Assessment & Plan:  Patient in need of a left hip arthroplasty but is tolerating his pain at this time    Orders:  -     traMADol (Ultram) 50 mg tablet; Take 1 tablet (50 mg total) by mouth every 6 (six) hours as needed for moderate pain  -     Basic metabolic panel; Future; Expected date: 2023  -     CBC and differential; Future; Expected date: 2023    4  Peripheral edema  Assessment & Plan: This has resolved  Orders:  -     Basic metabolic panel; Future; Expected date: 2023  -     CBC and differential; Future; Expected date: 2023    5  JEN (acute kidney injury) ([facility] [address] )  -     Basic metabolic panel; Future; Expected date: 2023  -     CBC and differential; Future; Expected date: 2023    6   Idiopathic gout, unspecified chronicity, unspecified site  -     Basic metabolic panel; Future; Expected date: 02/13/2023  -     CBC and differential; Future; Expected date: 02/13/2023  -     Uric acid; Future; Expected date: 02/13/2023    7  Continuous opioid dependence ([facility] [address] )  Assessment & Plan:  Continues with the use of tramadol 50 mg every 6 hours p r n  Because of severe left hip osteoarthritis  Patient is in need of a left hip arthroplasty but thus far he is willing to tolerate a discomfort           Subjective      Patient is seen for follow-up visit following an episode of urinary retention which required Nolasco catheter and recently patient underwent TURP  He still has a Nolasco catheter to drainage  There appears to be some small amount of hematuria present  Patient is drinking adequate amounts of fluid  His edema has cleared up  His renal function has improved  The anterior tibial laceration has healed  There is a dry eschar over the wound  His labs are reviewed  Significant improvement in renal function  Hemoglobin is stable and normal       Review of Systems   Constitutional: Negative  HENT: Negative  Eyes: Negative  Respiratory: Negative  Cardiovascular: Negative  Gastrointestinal: Negative  Endocrine: Negative  Genitourinary: Positive for difficulty urinating (Nolasco catheter to drainage  Status post TURP)  Musculoskeletal: Positive for arthralgias (Left hip)  Skin: Negative  Allergic/Immunologic: Negative  Neurological: Negative  Hematological: Negative  Psychiatric/Behavioral: Negative          Current Outpatient Medications on File Prior to Visit   Medication Sig   • acetaminophen (TYLENOL) 500 mg tablet Take 1 tablet (500 mg total) by mouth every 6 (six) hours as needed for mild pain   • allopurinol (ZYLOPRIM) 300 mg tablet Take 1 tablet (300 mg total) by mouth daily   • [DISCONTINUED] traMADol (Ultram) 50 mg tablet Take 1 tablet (50 mg total) by mouth every 6 (six) hours as needed for moderate pain   • [DISCONTINUED] mupirocin (BACTROBAN) 2 % ointment Apply topically daily (Patient not taking: Reported on 11/17/2022)   • [DISCONTINUED] potassium chloride (Klor-Con M10) 10 mEq tablet Take 1 tablet (10 mEq total) by mouth daily   • [DISCONTINUED] torsemide (DEMADEX) 10 mg tablet Take 2 tablets (20 mg total) by mouth every other day   • [DISCONTINUED] Wound Dressings UP [facility] [place] Dressing 6"x6") PADS Apply 1 each topically daily (Patient not taking: Reported on 10/20/2022)       Objective     /70 (BP Location: Left arm, Patient Position: Sitting, Cuff Size: Standard)   Pulse 96   Temp 98 °F (36 7 °C) (Temporal)   Ht 5' 6 22" (1 682 m)   Wt 79 4 kg (175 lb)   SpO2 98%   BMI 28 06 kg/m²     Physical Exam  Vitals reviewed  Constitutional:       General: He is not in acute distress  Appearance: Normal appearance  He is normal weight  He is not ill-appearing, toxic-appearing or diaphoretic  HENT:      Head: Normocephalic and atraumatic  Right Ear: External ear normal       Left Ear: External ear normal       Nose: Nose normal    Eyes:      General: No scleral icterus  Conjunctiva/sclera: Conjunctivae normal       Pupils: Pupils are equal, round, and reactive to light  Neck:      Vascular: No carotid bruit  Cardiovascular:      Rate and Rhythm: Normal rate and regular rhythm  Heart sounds: Normal heart sounds  No murmur heard  Pulmonary:      Effort: Pulmonary effort is normal  No respiratory distress  Breath sounds: Normal breath sounds  Abdominal:      General: Abdomen is flat  Bowel sounds are normal  There is no distension  Tenderness: There is no abdominal tenderness  Genitourinary:     Comments: Nolasco catheter to drainage  Musculoskeletal:         General: No swelling or tenderness  Cervical back: Neck supple  Right lower leg: No edema  Left lower leg: No edema  Lymphadenopathy:      Cervical: No cervical adenopathy     Skin: General: Skin is warm  Coloration: Skin is not jaundiced  Findings: No bruising, erythema or rash  Neurological:      General: No focal deficit present  Mental Status: He is alert and oriented to person, place, and time  Mental status is at baseline  Psychiatric:         Mood and Affect: Mood normal          Behavior: Behavior normal          Thought Content:  Thought content normal          Judgment: Judgment normal        Mj Rosen MD

## 2022-12-05 DIAGNOSIS — R60.9 PERIPHERAL EDEMA: ICD-10-CM

## 2022-12-05 DIAGNOSIS — N18.31 STAGE 3A CHRONIC KIDNEY DISEASE (HCC): Primary | ICD-10-CM

## 2022-12-05 RX ORDER — TORSEMIDE 10 MG/1
10 TABLET ORAL DAILY
Qty: 30 TABLET | Refills: 2 | Status: SHIPPED | OUTPATIENT
Start: 2022-12-05

## 2022-12-15 ENCOUNTER — HOSPITAL ENCOUNTER (OUTPATIENT)
Dept: NON INVASIVE DIAGNOSTICS | Facility: CLINIC | Age: 77
Discharge: HOME | End: 2022-12-15

## 2022-12-15 ENCOUNTER — OFFICE VISIT (OUTPATIENT)
Dept: INTERNAL MEDICINE CLINIC | Facility: CLINIC | Age: 77
End: 2022-12-15

## 2022-12-15 VITALS
WEIGHT: 178 LBS | TEMPERATURE: 98.5 F | OXYGEN SATURATION: 97 % | SYSTOLIC BLOOD PRESSURE: 98 MMHG | HEIGHT: 66 IN | DIASTOLIC BLOOD PRESSURE: 60 MMHG | BODY MASS INDEX: 28.61 KG/M2 | HEART RATE: 78 BPM

## 2022-12-15 DIAGNOSIS — N31.9 URINARY BLADDER NEUROGENIC DYSFUNCTION: ICD-10-CM

## 2022-12-15 DIAGNOSIS — M16.9 OSTEOARTHRITIS OF HIP, UNSPECIFIED LATERALITY, UNSPECIFIED OSTEOARTHRITIS TYPE: ICD-10-CM

## 2022-12-15 DIAGNOSIS — R60.0 LOCALIZED EDEMA: ICD-10-CM

## 2022-12-15 DIAGNOSIS — M79.89 LEFT LEG SWELLING: ICD-10-CM

## 2022-12-15 DIAGNOSIS — N18.31 STAGE 3A CHRONIC KIDNEY DISEASE (HCC): Primary | ICD-10-CM

## 2022-12-15 DIAGNOSIS — R60.9 PERIPHERAL EDEMA: ICD-10-CM

## 2022-12-15 DIAGNOSIS — S80.12XA TRAUMATIC HEMATOMA OF LEFT LOWER LEG, INITIAL ENCOUNTER: ICD-10-CM

## 2022-12-15 DIAGNOSIS — M16.12 PRIMARY OSTEOARTHRITIS OF LEFT HIP: ICD-10-CM

## 2022-12-15 RX ORDER — TRAMADOL HYDROCHLORIDE 50 MG/1
50 TABLET ORAL EVERY 6 HOURS PRN
Qty: 120 TABLET | Refills: 0 | Status: SHIPPED | OUTPATIENT
Start: 2022-12-15

## 2022-12-15 NOTE — PROGRESS NOTES
Name: Latosha Fox      : 1945      MRN: [id number]  Encounter Provider: Beka Reno MD  Encounter Date: 12/15/2022   Encounter department: [address] [place] [address]     1  Stage 3a chronic kidney disease [facility])  Assessment & Plan:  Lab Results   Component Value Date    EGFR 47 (L) 2022    EGFR 41 (L) 2022    EGFR 36 2019    CREATININE 1 53 (H) 2022    CREATININE 1 70 (H) 2022    CREATININE 2 29 (H) 2022   Renal function improved with chronic Nolasco catheter drainage  Urodynamic studies are indicative of bladder dysfunction  Patient will need either chronic Nolasco catheter, suprapubic cystostomy or intermittent bladder catheterization  Will consider obtaining a 2nd urological opinion      2  Traumatic hematoma of left lower leg, initial encounter  Assessment & Plan:  Warm compresses to the left lower lateral leg  Patient appears to have a significant size hematoma  May need to consider surgical evacuation  Orders:  -     VAS lower limb venous duplex study, unilateral/limited; Future; Expected date: 12/15/2022    3  Left leg swelling  Assessment & Plan: Will refer for stat venous Doppler of the left leg  Findings are suspicious for DVT  Orders:  -     VAS lower limb venous duplex study, unilateral/limited; Future; Expected date: 12/15/2022    4  Localized edema  -     VAS lower limb venous duplex study, unilateral/limited; Future; Expected date: 12/15/2022    5  Osteoarthritis of hip, unspecified laterality, unspecified osteoarthritis type  Assessment & Plan:  Continues with Tylenol and tramadol as needed  Orders:  -     traMADol (Ultram) 50 mg tablet; Take 1 tablet (50 mg total) by mouth every 6 (six) hours as needed for moderate pain    6  Primary osteoarthritis of left hip  Assessment & Plan:  Continues with Tylenol and tramadol as needed  Orders:  -     traMADol (Ultram) 50 mg tablet;  Take 1 tablet (50 mg total) by mouth every 6 (six) hours as needed for moderate pain    7  Urinary bladder neurogenic dysfunction  Assessment & Plan:  Patient has been told he will need a chronic Nolasco or suprapubic drainage versus intermittent catheterization  8  Peripheral edema           Subjective      The patient presents to the office for a follow-up visit and has been experiencing significant in left lower extremity swelling  He states he banged his left leg against a door a few days ago and is experiencing some lower leg swelling in addition to underlying swelling of his left leg  His right leg demonstrates trace to 1+ pretibial and ankle edema  He denies any pain in his left leg  He had contact the office about recurrent leg swelling and was told to restart his diuretics  He restarted them by taking double doses twice a day  He denies any fever chills  He denies any shortness of breath  Review of Systems   Constitutional: Negative  HENT: Negative  Eyes: Negative  Respiratory: Negative  Cardiovascular: Positive for leg swelling (Left leg)  Gastrointestinal: Negative  Endocrine: Negative  Genitourinary:        Neurogenic bladder secondary to chronic bladder outlet obstruction  Patient to discuss with Urology the possibility of chronic intermittent catheterization as opposed to  indwelling Nolasco or suprapubic cystostomy tube   Musculoskeletal: Positive for arthralgias (Left hip)  Skin: Negative  Allergic/Immunologic: Negative  Neurological: Negative  Hematological: Negative  Psychiatric/Behavioral: Negative          Current Outpatient Medications on File Prior to Visit   Medication Sig   • acetaminophen (TYLENOL) 500 mg tablet Take 1 tablet (500 mg total) by mouth every 6 (six) hours as needed for mild pain   • allopurinol (ZYLOPRIM) 300 mg tablet Take 1 tablet (300 mg total) by mouth daily   • torsemide (DEMADEX) 10 mg tablet Take 1 tablet (10 mg total) by mouth daily (Patient taking differently: Take 20 mg by mouth 2 (two) times a day)   • [DISCONTINUED] traMADol (Ultram) 50 mg tablet Take 1 tablet (50 mg total) by mouth every 6 (six) hours as needed for moderate pain       Objective     BP 98/60 (BP Location: Left arm, Patient Position: Sitting, Cuff Size: Standard)   Pulse 78   Temp 98 5 °F (36 9 °C) (Tympanic)   Ht 5' 6" (1 676 m)   Wt 80 7 kg (178 lb)   SpO2 97%   BMI 28 73 kg/m²     Physical Exam  Vitals reviewed  Constitutional:       General: He is not in acute distress  Appearance: He is normal weight  He is not ill-appearing, toxic-appearing or diaphoretic  HENT:      Head: Normocephalic and atraumatic  Right Ear: External ear normal       Left Ear: External ear normal       Nose: Nose normal    Eyes:      General: No scleral icterus  Conjunctiva/sclera: Conjunctivae normal       Pupils: Pupils are equal, round, and reactive to light  Cardiovascular:      Rate and Rhythm: Normal rate and regular rhythm  Heart sounds: Normal heart sounds  No murmur heard  Pulmonary:      Effort: Pulmonary effort is normal  No respiratory distress  Breath sounds: Normal breath sounds  Abdominal:      General: Abdomen is flat  There is no distension  Genitourinary:     Comments: Indwelling Nolasco catheter to leg bag drainage  Musculoskeletal:         General: Swelling (Of the entire left lower extremity with a large lateral hematoma of the lower left leg), tenderness (Overlying the hematoma of the lateral lower leg) and signs of injury ([place] Chick his left leg into a door several days ago) present  Cervical back: Neck supple  Right lower leg: Edema (Trace) present  Left lower leg: Edema (3+ edema of the left lower extremity) present  Skin:     General: Skin is warm  Capillary Refill: Capillary refill takes less than 2 seconds  Coloration: Skin is not jaundiced  Findings: Bruising (Left lateral lower leg) present   No erythema or rash  Neurological:      General: No focal deficit present  Mental Status: He is alert and oriented to person, place, and time  Mental status is at baseline     Psychiatric:         Mood and Affect: Mood normal          Behavior: Behavior normal        Mj Rosen MD

## 2022-12-15 NOTE — ASSESSMENT & PLAN NOTE
Lab Results   Component Value Date    EGFR 47 (L) 11/14/2022    EGFR 41 (L) 07/13/2022    EGFR 36 08/05/2019    CREATININE 1 53 (H) 11/14/2022    CREATININE 1 70 (H) 07/13/2022    CREATININE 2 29 (H) 06/14/2022   Renal function improved with chronic Nolasco catheter drainage  Urodynamic studies are indicative of bladder dysfunction  Patient will need either chronic Nolasco catheter, suprapubic cystostomy or intermittent bladder catheterization    Will consider obtaining a 2nd urological opinion

## 2022-12-15 NOTE — ASSESSMENT & PLAN NOTE
Patient has been told he will need a chronic Nolasco or suprapubic drainage versus intermittent catheterization

## 2022-12-15 NOTE — ASSESSMENT & PLAN NOTE
Warm compresses to the left lower lateral leg  Patient appears to have a significant size hematoma  May need to consider surgical evacuation

## 2023-02-09 LAB
BASOPHILS # BLD AUTO: 70 CELLS/UL (ref 0–200)
BASOPHILS NFR BLD AUTO: 0.8 %
BUN SERPL-MCNC: 25 MG/DL (ref 7–25)
BUN/CREAT SERPL: 14 (CALC) (ref 6–22)
CALCIUM SERPL-MCNC: 9.6 MG/DL (ref 8.6–10.3)
CHLORIDE SERPL-SCNC: 102 MMOL/L (ref 98–110)
CO2 SERPL-SCNC: 29 MMOL/L (ref 20–32)
CREAT SERPL-MCNC: 1.79 MG/DL (ref 0.7–1.28)
EOSINOPHIL # BLD AUTO: 352 CELLS/UL (ref 15–500)
EOSINOPHIL NFR BLD AUTO: 4 %
ERYTHROCYTE [DISTWIDTH] IN BLOOD BY AUTOMATED COUNT: 12.8 % (ref 11–15)
GFR/BSA.PRED SERPLBLD CYS-BASED-ARV: 39 ML/MIN/1.73M2
GLUCOSE SERPL-MCNC: 98 MG/DL (ref 65–99)
HCT VFR BLD AUTO: 36.5 % (ref 38.5–50)
HGB BLD-MCNC: 12.6 G/DL (ref 13.2–17.1)
LYMPHOCYTES # BLD AUTO: 2834 CELLS/UL (ref 850–3900)
LYMPHOCYTES NFR BLD AUTO: 32.2 %
MCH RBC QN AUTO: 30.1 PG (ref 27–33)
MCHC RBC AUTO-ENTMCNC: 34.5 G/DL (ref 32–36)
MCV RBC AUTO: 87.3 FL (ref 80–100)
MONOCYTES # BLD AUTO: 678 CELLS/UL (ref 200–950)
MONOCYTES NFR BLD AUTO: 7.7 %
NEUTROPHILS # BLD AUTO: 4866 CELLS/UL (ref 1500–7800)
NEUTROPHILS NFR BLD AUTO: 55.3 %
PLATELET # BLD AUTO: 330 THOUSAND/UL (ref 140–400)
PMV BLD REES-ECKER: 9.2 FL (ref 7.5–12.5)
POTASSIUM SERPL-SCNC: 4.6 MMOL/L (ref 3.5–5.3)
RBC # BLD AUTO: 4.18 MILLION/UL (ref 4.2–5.8)
SODIUM SERPL-SCNC: 139 MMOL/L (ref 135–146)
URATE SERPL-MCNC: 5.1 MG/DL (ref 4–8)
WBC # BLD AUTO: 8.8 THOUSAND/UL (ref 3.8–10.8)

## 2023-03-10 ENCOUNTER — OFFICE VISIT (OUTPATIENT)
Dept: INTERNAL MEDICINE CLINIC | Facility: CLINIC | Age: 78
End: 2023-03-10

## 2023-03-10 ENCOUNTER — TELEPHONE (OUTPATIENT)
Dept: INTERNAL MEDICINE CLINIC | Facility: CLINIC | Age: 78
End: 2023-03-10

## 2023-03-10 VITALS
WEIGHT: 188.6 LBS | TEMPERATURE: 98.7 F | HEART RATE: 69 BPM | SYSTOLIC BLOOD PRESSURE: 120 MMHG | BODY MASS INDEX: 30.31 KG/M2 | HEIGHT: 66 IN | DIASTOLIC BLOOD PRESSURE: 62 MMHG | OXYGEN SATURATION: 98 %

## 2023-03-10 DIAGNOSIS — M79.89 LEFT LEG SWELLING: Primary | ICD-10-CM

## 2023-03-10 DIAGNOSIS — L03.116 LEFT LEG CELLULITIS: ICD-10-CM

## 2023-03-10 RX ORDER — TRAMADOL HYDROCHLORIDE 50 MG/1
50 TABLET ORAL EVERY 6 HOURS PRN
Qty: 120 TABLET | Refills: 0 | Status: CANCELLED | OUTPATIENT
Start: 2023-03-10

## 2023-03-10 NOTE — LETTER
March 10, 2023     Patient: Sarwat Elliott  YOB: 1945  Date of Visit: 3/10/2023      To Whom it May Concern:    Sosa Montero is under my professional care  Maraislinn Aponte was seen in my office on 3/10/2023  Caridad Aponte was sent to the emergency room for further evaluation and treatment  He was accompanied today in the office by, Jese Carreon, who will be transporting him to the ER  If you have any questions or concerns, please don't hesitate to call           Sincerely,          Louise Fernandes PA-C        CC: No Recipients

## 2023-03-10 NOTE — ASSESSMENT & PLAN NOTE
Left leg cellulitis with abscess left lateral calf, draining  Edema extends proximally above knee  Sent to ER for further eval and treat due to severity and need for further imaging/workup

## 2023-03-10 NOTE — ASSESSMENT & PLAN NOTE
Patient presents to the office today with persistent left leg swelling and drainage following a traumatic injury  Reports occurred 3 weeks ago  Suspect abscess with cellulitis  Will need duplex to rule out DVt along with additional testing  Patient directed to emergency room immediately  Patient and his partner verbalized understanding and are willing and plan to go to the [facility] which is directly down the road  Copy of this note given to patient so that he can present to emergency room  Patient's partner given note that she accompanied patient as she had to cancel a doctors apt and needed a note    Patient prefers [facility] as it is [facility]

## 2023-03-10 NOTE — PROGRESS NOTES
[address]  Standard Office Visit  Patient ID: Prince Pritchard    : 1945  Age/Gender: 69 y o  male     DATE: 3/10/2023      Assessment/Plan:    Left leg swelling  Patient presents to the office today with persistent left leg swelling and drainage following a traumatic injury  Reports occurred 3 weeks ago  Suspect abscess with cellulitis  Will need duplex to rule out DVt along with additional testing  Patient directed to emergency room immediately  Patient and his partner verbalized understanding and are willing and plan to go to the [facility] which is directly down the road  Copy of this note given to patient so that he can present to emergency room  Patient's partner given note that she accompanied patient as she had to cancel a doctors apt and needed a note  Patient prefers [facility] as it is closest hospital       Left leg cellulitis  Left leg cellulitis with abscess left lateral calf, draining  Edema extends proximally above knee  Sent to ER for further eval and treat due to severity and need for further imaging/workup  Diagnoses and all orders for this visit:    Left leg swelling    Left leg cellulitis                Subjective:   Chief Complaint   Patient presents with   • Follow-up     Left lower leg open wound for 2 weeks bleeding   • Health Screening     Fall risk awv due after          Prince Pritchard is a 69 y o  male who presents to the office on 3/10/2023 for         70 yo male with h/o arthritis hip, LE swelling, CKD presents to office with partner for eval of L leg swelling  The area had been seen and evaluted with duplex 2022  He had a traumatic injury to the area about 3 weeks ago when he was leaving work  Notes that an automatic closing dose closed and struck his L calf  The leg became much more swolllen after the injury    The swelling has progressed and now there is a collection of fluid that started draining  NO prior abscess or wound care history  He is not a diabetic, no prior neuropathy  He denies fever or pain in the area but does have pressure  The area is red and warm to the touch   + odor, draining blood and yellow colored fluid  He has not been seen for this leg since the injury and the acute worsening  Notes he did not report the injury to work because he did not think it would develop into this  L hip pain chronic, unchanged  No prior h/o blood clots per patient  No family history of blood clots  No recent travel or prolonged sitting  No prior evel for this  Has never seen wound care  Patient follows with urology and has catheter in place  No change in urine output  Taking torsemide 10 mg daily as directed and ultram for pain control  Kassyjesu Parker present  Leg Pain   The incident occurred more than 1 week ago  The incident occurred at work  The injury mechanism was a direct blow  The pain is present in the left leg  The quality of the pain is described as aching  The pain is at a severity of 0/10  The patient is experiencing no pain  The pain has been constant since onset  Pertinent negatives include no inability to bear weight, muscle weakness, numbness or tingling  He reports no foreign bodies present  The symptoms are aggravated by movement  He has tried nothing for the symptoms  Rash  This is a new problem  The current episode started 1 to 4 weeks ago  The problem has been rapidly worsening since onset  Location: left lower leg  The problem is severe  The rash is characterized by blistering, draining, peeling, scaling, redness and swelling  He was exposed to nothing  The rash first occurred at work  Associated symptoms include fatigue  Pertinent negatives include no anorexia, cough, decreased physical activity, diarrhea, fever, shortness of breath or vomiting         The following portions of the patient's history were reviewed and updated as appropriate: allergies, current medications, past family history, past medical history, past social history, past surgical history and problem list     Review of Systems   Constitutional: Positive for fatigue  Negative for fever  Respiratory: Negative for cough, shortness of breath and wheezing  Cardiovascular: Positive for leg swelling  Negative for chest pain and palpitations  Gastrointestinal: Negative for anorexia, diarrhea, nausea and vomiting  Musculoskeletal: Positive for arthralgias and joint swelling (L knee)  Skin: Positive for color change, rash and wound  Neurological: Negative for tingling and numbness           Patient Active Problem List   Diagnosis   • Stage 3a chronic kidney disease ([address] )   • Degenerative arthritis of hip   • Elevated PSA   • Nocturia   • Primary osteoarthritis of left hip   • Medicare annual wellness visit, subsequent   • Other atopic dermatitis   • Elevated fasting glucose   • Urinary bladder neurogenic dysfunction   • Right elbow pain   • Continuous opioid dependence (HCC)   • Noninfected skin tear of lower extremity, left, subsequent encounter   • Traumatic hematoma of left lower leg   • Left leg swelling   • Left leg cellulitis       Past Medical History:   Diagnosis Date   • JEN (acute kidney injury) ([facility] [address] ) 3/17/2022   • Benign hypertension with chronic kidney disease, stage III ([address] ) 8/22/2016   • Bilateral pneumothoraces 1/9/2021   • Colon polyps    • Degenerative arthritis of hip 3/2/2015   • Gout    • Impacted cerumen of both ears     last assessed-2/9/2015   • Peripheral edema 9/22/2014   • Pneumonia due to COVID-19 virus 1/25/2021   • Pressure injury of sacral region, stage 3 ([address] ) 2/3/2021   • Renal calculi     last assessed-9/22/2014   • Respiratory failure ([facility] [address] ) 12/29/2020   • Urinary problem     last assessed-9/22/2014   • Uveitis     last assessed-2/22/2016       Past Surgical History:   Procedure Laterality Date   • CATARACT EXTRACTION Right    • CO COLONOSCOPY FLX DX W/COLLJ SPEC WHEN PFRMD N/A 10/8/2018    Procedure: COLONOSCOPY;  Surgeon: Elodia Washington MD;  Location: [facility];   Service: Gastroenterology   • TONSILLECTOMY      last assessed-2014         Current Outpatient Medications:   •  acetaminophen (TYLENOL) 500 mg tablet, Take 1 tablet (500 mg total) by mouth every 6 (six) hours as needed for mild pain, Disp: 120 tablet, Rfl: 2  •  allopurinol (ZYLOPRIM) 300 mg tablet, Take 1 tablet (300 mg total) by mouth daily, Disp: 90 tablet, Rfl: 1  •  torsemide (DEMADEX) 10 mg tablet, Take 1 tablet (10 mg total) by mouth daily, Disp: 30 tablet, Rfl: 2  •  traMADol (Ultram) 50 mg tablet, Take 1 tablet (50 mg total) by mouth every 6 (six) hours as needed for moderate pain, Disp: 120 tablet, Rfl: 0    No Known Allergies    Social History     Socioeconomic History   • Marital status: Significant Other     Spouse name: None   • Number of children: None   • Years of education: None   • Highest education level: None   Occupational History   • None   Tobacco Use   • Smoking status: Former     Types: Pipe     Start date: 200     Quit date: 2021     Years since quittin 0   • Smokeless tobacco: Never   Vaping Use   • Vaping Use: Never used   Substance and Sexual Activity   • Alcohol use: No   • Drug use: No   • Sexual activity: None   Other Topics Concern   • None   Social History Narrative   • None     Social Determinants of Health     Financial Resource Strain: Not on file   Food Insecurity: Not on file   Transportation Needs: Not on file   Physical Activity: Not on file   Stress: Not on file   Social Connections: Not on file   Intimate Partner Violence: Not on file   Housing Stability: Not on file       Family History   Problem Relation Age of Onset   • Breast cancer Mother    • Coronary artery disease Father        PHQ-2/9 Depression Screening           Health Maintenance   Topic Date Due   • SLP PLAN OF CARE  Never done   • COVID-19 Vaccine (4 - Booster for Cota Peter series) 12/17/2021   • BMI: Followup Plan  01/24/2023   • Medicare Annual Wellness Visit (AWV)  04/12/2023   • Colorectal Cancer Screening  10/08/2023   • Depression Screening  10/20/2023   • Fall Risk  03/10/2024   • BMI: Adult  03/10/2024   • Hepatitis C Screening  Completed   • Pneumococcal Vaccine: 65+ Years  Completed   • Influenza Vaccine  Completed   • HIB Vaccine  Aged Out   • IPV Vaccine  Aged Out   • Hepatitis A Vaccine  Aged Out   • Meningococcal ACWY Vaccine  Aged Out   • HPV Vaccine  Aged Out       Immunization History   Administered Date(s) Administered   • COVID-19 PFIZER VACCINE 0 3 ML IM 02/16/2021, 03/09/2021, 10/22/2021   • COVID-19, unspecified 02/16/2021   • INFLUENZA 10/01/2013, 09/16/2014, 10/07/2015, 09/28/2016, 09/20/2017, 09/22/2018, 09/09/2019, 09/05/2021, 09/27/2022   • Influenza Split High Dose Preservative Free IM 09/16/2014, 09/22/2018, 09/09/2019, 09/06/2020   • Influenza, high dose seasonal 0 7 mL 09/06/2020, 09/27/2022   • Influenza, seasonal, injectable 10/16/2015   • Pneumococcal Conjugate 13-Valent 07/15/2019   • Pneumococcal Polysaccharide PPV23 09/22/2014        Objective:  Vitals:    03/10/23 0803   BP: 120/62   BP Location: Left arm   Patient Position: Sitting   Cuff Size: Standard   Pulse: 69   Temp: 98 7 °F (37 1 °C)   TempSrc: Temporal   SpO2: 98%   Weight: 85 5 kg (188 lb 9 6 oz)   Height: 5' 5 87" (1 673 m)     Wt Readings from Last 3 Encounters:   03/10/23 85 5 kg (188 lb 9 6 oz)   12/15/22 80 7 kg (178 lb)   11/17/22 79 4 kg (175 lb)     Body mass index is 30 56 kg/m²  No results found  Physical Exam  Vitals and nursing note reviewed  Constitutional:       General: He is not in acute distress  Appearance: He is well-developed  He is ill-appearing  He is not toxic-appearing or diaphoretic  HENT:      Head: Normocephalic and atraumatic  Eyes:      General: No scleral icterus  Right eye: No discharge  Left eye: No discharge        Pupils: Pupils are equal, round, and reactive to light  Cardiovascular:      Rate and Rhythm: Normal rate and regular rhythm  Heart sounds: Normal heart sounds  No murmur heard  Pulmonary:      Effort: Pulmonary effort is normal  No respiratory distress  Breath sounds: Normal breath sounds  No wheezing or rales  Comments: CTA bilaterally  Abdominal:      General: There is no distension  Palpations: Abdomen is soft  Tenderness: There is no abdominal tenderness  Comments: Urinary catheter in place, yellow clear urine in tubing   Musculoskeletal:         General: Tenderness and signs of injury present  Right lower leg: No edema  Left lower leg: Edema present  Skin:     General: Skin is warm and dry  Findings: Abscess, erythema, signs of injury, rash and wound present  Rash is crusting and scaling  Comments: Left 47 CM  Right 38 CM    Calf circumference measured 10CM distal from lower pole of patella  Neurological:      General: No focal deficit present  Mental Status: He is alert  Psychiatric:         Mood and Affect: Mood normal          Thought Content: Thought content normal              Future Appointments   Date Time Provider Americo Campbell   4/28/2023  9:15 AM Caprice Waller MD [place] [id number]    Patient Care Team:  Caprice Waller MD as PCP - General  EmoMD Jerry Rossi MD Ferd Lasso, MD as Endoscopist    This note was completed in part utilizing M-Modal Fluency Direct Software  Grammatical errors, random word insertions, spelling mistakes, and incomplete sentences can be an occasional consequence of this system secondary to software limitations, ambient noise, and hardware issues    If you have any questions or concerns about the content, text, or information contained within the body of this dictation, please contact the provider for clarification

## 2023-03-10 NOTE — TELEPHONE ENCOUNTER
Patient seen today by Marisela Umana PA-C  Was referred to ED by provider  Attempted multiple times to reach out to patient to verify if he did go to the emergency department but phone line has been busy  Provider requests we call patient one more time Monday to verify if he did go to the Emergency Department as directed

## 2023-03-10 NOTE — PATIENT INSTRUCTIONS
Left leg swelling  Patient presents to the office today with persistent left leg swelling and drainage following a traumatic injury  Reports occurred 3 weeks ago  Suspect abscess with cellulitis  Will need duplex to rule out DVt along with additional testing  Patient directed to emergency room immediately  Patient and his partner verbalized understanding and are willing and plan to go to the [facility] which is directly down the road  Copy of this note given to patient so that he can present to emergency room  Patient's partner given note that she accompanied patient as she had to cancel a doctors apt and needed a note  Left leg cellulitis  Left leg cellulitis with abscess left lateral calf, draining  Edema extends proximally above knee  Sent to ER for further eval and treat due to severity and need for further imaging/workup

## 2023-03-13 DIAGNOSIS — M16.9 OSTEOARTHRITIS OF HIP, UNSPECIFIED LATERALITY, UNSPECIFIED OSTEOARTHRITIS TYPE: ICD-10-CM

## 2023-03-13 DIAGNOSIS — M16.12 PRIMARY OSTEOARTHRITIS OF LEFT HIP: ICD-10-CM

## 2023-03-13 RX ORDER — TRAMADOL HYDROCHLORIDE 50 MG/1
50 TABLET ORAL EVERY 6 HOURS PRN
Qty: 120 TABLET | Refills: 0 | Status: SHIPPED | OUTPATIENT
Start: 2023-03-13

## 2023-04-25 DIAGNOSIS — M10.9 GOUT, ARTHROPATHY: ICD-10-CM

## 2023-04-25 RX ORDER — ALLOPURINOL 300 MG/1
300 TABLET ORAL DAILY
Qty: 90 TABLET | Refills: 1 | Status: SHIPPED | OUTPATIENT
Start: 2023-04-25

## 2023-04-28 ENCOUNTER — OFFICE VISIT (OUTPATIENT)
Dept: INTERNAL MEDICINE CLINIC | Facility: CLINIC | Age: 78
End: 2023-04-28

## 2023-04-28 VITALS
WEIGHT: 191.4 LBS | SYSTOLIC BLOOD PRESSURE: 122 MMHG | HEIGHT: 63 IN | DIASTOLIC BLOOD PRESSURE: 66 MMHG | HEART RATE: 70 BPM | OXYGEN SATURATION: 98 % | BODY MASS INDEX: 33.91 KG/M2 | TEMPERATURE: 98.3 F

## 2023-04-28 DIAGNOSIS — N31.9 URINARY BLADDER NEUROGENIC DYSFUNCTION: ICD-10-CM

## 2023-04-28 DIAGNOSIS — N18.32 STAGE 3B CHRONIC KIDNEY DISEASE (HCC): ICD-10-CM

## 2023-04-28 DIAGNOSIS — S80.12XD TRAUMATIC HEMATOMA OF LEFT LOWER LEG, SUBSEQUENT ENCOUNTER: ICD-10-CM

## 2023-04-28 DIAGNOSIS — M16.12 PRIMARY OSTEOARTHRITIS OF LEFT HIP: ICD-10-CM

## 2023-04-28 DIAGNOSIS — N18.4 STAGE 4 CHRONIC KIDNEY DISEASE (HCC): ICD-10-CM

## 2023-04-28 DIAGNOSIS — F11.20 CONTINUOUS OPIOID DEPENDENCE (HCC): ICD-10-CM

## 2023-04-28 DIAGNOSIS — Z00.00 MEDICARE ANNUAL WELLNESS VISIT, SUBSEQUENT: Primary | ICD-10-CM

## 2023-04-28 DIAGNOSIS — M16.9 OSTEOARTHRITIS OF HIP, UNSPECIFIED LATERALITY, UNSPECIFIED OSTEOARTHRITIS TYPE: ICD-10-CM

## 2023-04-28 DIAGNOSIS — R97.20 ELEVATED PSA: ICD-10-CM

## 2023-04-28 RX ORDER — TRAMADOL HYDROCHLORIDE 50 MG/1
50 TABLET ORAL EVERY 6 HOURS PRN
Qty: 120 TABLET | Refills: 0 | Status: SHIPPED | OUTPATIENT
Start: 2023-04-28

## 2023-04-28 NOTE — PROGRESS NOTES
Assessment and Plan:     Problem List Items Addressed This Visit        Musculoskeletal and Integument    Degenerative arthritis of hip       Depression Screening and Follow-up Plan: Patient was screened for depression during today's encounter  They screened negative with a PHQ-2 score of 0  Preventive health issues were discussed with patient, and age appropriate screening tests were ordered as noted in patient's After Visit Summary  Personalized health advice and appropriate referrals for health education or preventive services given if needed, as noted in patient's After Visit Summary  History of Present Illness:     Patient presents for a Medicare Wellness Visit    Patient presents to the office for a follow-up visit  In general he is doing fairly well  He continues with chronic arthritic pain in his left hip  He uses tramadol on a regular basis  He still does not feel he needs to have a hip replacement surgery  He has an indwelling Nolasco catheter for neurogenic bladder  He has been following at wound care for a traumatic hematoma of his left anterior tibia  He reports no chills or fever  He reports no episodes of hematuria  No problems with constipation or diarrhea  His appetite is good  He continues to have some issues with bilateral edema which is controlled with low-dose torsemide  He had some labs drawn in mid March which were consistent with stage IIIb chronic kidney disease  CBC was essentially normal      Patient Care Team:  Leta Barragan MD as PCP - General  MD Edyta Kendall MD Merry Christian, MD as Endoscopist     Review of Systems:     Review of Systems   Constitutional: Negative  HENT: Negative  Eyes: Negative  Respiratory: Negative  Cardiovascular: Positive for leg swelling (Bilateral leg swelling, left greater than right)  Gastrointestinal: Negative      Genitourinary:        Chronic indwelling Nolasco   Musculoskeletal: Positive for arthralgias (Chronic bilateral hip arthritis left greater than right)  Allergic/Immunologic: Negative  Neurological: Negative  Hematological: Negative  Psychiatric/Behavioral: Negative  Problem List:     Patient Active Problem List   Diagnosis   • Stage 3a chronic kidney disease ([facility] [address] )   • Degenerative arthritis of hip   • Elevated PSA   • Nocturia   • Primary osteoarthritis of left hip   • Medicare annual wellness visit, subsequent   • Other atopic dermatitis   • Elevated fasting glucose   • Urinary bladder neurogenic dysfunction   • Right elbow pain   • Continuous opioid dependence ([facility] [address] )   • Noninfected skin tear of lower extremity, left, subsequent encounter   • Traumatic hematoma of left lower leg   • Left leg swelling   • Left leg cellulitis      Past Medical and Surgical History:     Past Medical History:   Diagnosis Date   • JEN (acute kidney injury) ([facility] [address] ) 3/17/2022   • Benign hypertension with chronic kidney disease, stage III ([facility] [address] ) 8/22/2016   • Bilateral pneumothoraces 1/9/2021   • Colon polyps    • Degenerative arthritis of hip 3/2/2015   • Gout    • Impacted cerumen of both ears     last assessed-2/9/2015   • Peripheral edema 9/22/2014   • Pneumonia due to COVID-19 virus 1/25/2021   • Pressure injury of sacral region, stage 3 ([facility] [address] ) 2/3/2021   • Renal calculi     last assessed-9/22/2014   • Respiratory failure ([facility] [address] ) 12/29/2020   • Urinary problem     last assessed-9/22/2014   • Uveitis     last assessed-2/22/2016     Past Surgical History:   Procedure Laterality Date   • CATARACT EXTRACTION Right    • OH COLONOSCOPY FLX DX W/COLLJ SPEC WHEN PFRMD N/A 10/8/2018    Procedure: COLONOSCOPY;  Surgeon: Gabriela Goetz MD;  Location: [facility];   Service: Gastroenterology   • TONSILLECTOMY      last assessed-9/22/2014      Family History:     Family History   Problem Relation Age of Onset   • Breast cancer Mother    • Coronary artery disease Father       Social History:     Social History Socioeconomic History   • Marital status: Significant Other     Spouse name: None   • Number of children: None   • Years of education: None   • Highest education level: None   Occupational History   • None   Tobacco Use   • Smoking status: Former     Types: Pipe     Start date: 200     Quit date: 2021     Years since quittin 1   • Smokeless tobacco: Never   Vaping Use   • Vaping Use: Never used   Substance and Sexual Activity   • Alcohol use: No   • Drug use: No   • Sexual activity: None   Other Topics Concern   • None   Social History Narrative   • None     Social Determinants of Health     Financial Resource Strain: Not on file   Food Insecurity: Not on file   Transportation Needs: Not on file   Physical Activity: Not on file   Stress: Not on file   Social Connections: Not on file   Intimate Partner Violence: Not on file   Housing Stability: Not on file      Medications and Allergies:     Current Outpatient Medications   Medication Sig Dispense Refill   • acetaminophen (TYLENOL) 500 mg tablet Take 1 tablet (500 mg total) by mouth every 6 (six) hours as needed for mild pain 120 tablet 2   • allopurinol (ZYLOPRIM) 300 mg tablet Take 1 tablet (300 mg total) by mouth daily 90 tablet 1   • torsemide (DEMADEX) 10 mg tablet Take 1 tablet (10 mg total) by mouth daily 30 tablet 2   • traMADol (Ultram) 50 mg tablet Take 1 tablet (50 mg total) by mouth every 6 (six) hours as needed for moderate pain 120 tablet 0     No current facility-administered medications for this visit       No Known Allergies   Immunizations:     Immunization History   Administered Date(s) Administered   • COVID-19 PFIZER VACCINE 0 3 ML IM 2021, 2021, 10/22/2021   • COVID-19, unspecified 2021   • INFLUENZA 10/01/2013, 2014, 10/07/2015, 2016, 2017, 2018, 2019, 2021, 2022   • Influenza Split High Dose Preservative Free IM 2014, 2018, 2019, 2020   • Influenza, high dose seasonal 0 7 mL 09/06/2020, 09/27/2022   • Influenza, seasonal, injectable 10/16/2015   • Pneumococcal Conjugate 13-Valent 07/15/2019   • Pneumococcal Polysaccharide PPV23 09/22/2014      Health Maintenance:         Topic Date Due   • Colorectal Cancer Screening  10/08/2023   • Hepatitis C Screening  Completed         Topic Date Due   • COVID-19 Vaccine (5 - Booster) 12/17/2021      Medicare Screening Tests and Risk Assessments:     Waqar Arnold is here for his Subsequent Wellness visit  Health Risk Assessment:   Patient rates overall health as very good  Patient feels that their physical health rating is same  Patient is satisfied with their life  Eyesight was rated as same  Hearing was rated as same  Patient feels that their emotional and mental health rating is same  Patients states they are never, rarely angry  Patient states they are never, rarely unusually tired/fatigued  Pain experienced in the last 7 days has been some  Patient's pain rating has been 4/10  Patient states that he has experienced no weight loss or gain in last 6 months  Depression Screening:   PHQ-2 Score: 0      Fall Risk Screening: In the past year, patient has experienced: no history of falling in past year      Home Safety:  Patient does not have trouble with stairs inside or outside of their home  Patient has working smoke alarms and has no working carbon monoxide detector  Home safety hazards include: none  Nutrition:   Current diet is Regular  Medications:   Patient is not currently taking any over-the-counter supplements  Patient is able to manage medications  Activities of Daily Living (ADLs)/Instrumental Activities of Daily Living (IADLs):   Walk and transfer into and out of bed and chair?: Yes  Dress and groom yourself?: Yes    Bathe or shower yourself?: Yes    Feed yourself?  Yes  Do your laundry/housekeeping?: Yes  Manage your money, pay your bills and track your expenses?: Yes  Make your own meals?: Yes "  Do your own shopping?: Yes    Previous Hospitalizations:   Any hospitalizations or ED visits within the last 12 months?: No      Advance Care Planning:   Living will: Yes    Durable POA for healthcare: No    Advanced directive: Yes    Advanced directive counseling given: Yes    Five wishes given: Yes    Patient declined ACP directive: Yes    End of Life Decisions reviewed with patient: Yes    Provider agrees with end of life decisions: Yes      Cognitive Screening:   Provider or family/friend/caregiver concerned regarding cognition?: No    PREVENTIVE SCREENINGS      Cardiovascular Screening:    General: Screening Current      Diabetes Screening:     General: Screening Current      Colorectal Cancer Screening:     General: Screening Current      Prostate Cancer Screening:    General: Screening Not Indicated and Screening Current    Due for: PSA      Osteoporosis Screening:    General: Screening Not Indicated      Abdominal Aortic Aneurysm (AAA) Screening:    Risk factors include: tobacco use        Lung Cancer Screening:     General: Screening Not Indicated and Patient Declines      Hepatitis C Screening:    General: Screening Current    Screening, Brief Intervention, and Referral to Treatment (SBIRT)    Screening  Typical number of drinks in a day: 0  Typical number of drinks in a week: 0  Interpretation: Low risk drinking behavior  Annual Depression Screening  Time spent screening and evaluating the patient for depression during today's encounter was 5 minutes  No results found  Physical Exam:     /66 (BP Location: Left arm, Patient Position: Sitting, Cuff Size: Standard)   Pulse 70   Temp 98 3 °F (36 8 °C) (Temporal)   Ht 5' 2 6\" (1 59 m)   Wt 86 8 kg (191 lb 6 4 oz)   SpO2 98%   BMI 34 34 kg/m²     Physical Exam  Vitals reviewed  Constitutional:       General: He is not in acute distress  Appearance: Normal appearance  He is not ill-appearing, toxic-appearing or diaphoretic     HENT: " Head: Normocephalic and atraumatic  Right Ear: External ear normal       Left Ear: External ear normal       Mouth/Throat:      Mouth: Mucous membranes are moist    Eyes:      General: No scleral icterus  Conjunctiva/sclera: Conjunctivae normal       Pupils: Pupils are equal, round, and reactive to light  Cardiovascular:      Rate and Rhythm: Normal rate and regular rhythm  Heart sounds: Normal heart sounds  Pulmonary:      Effort: Pulmonary effort is normal  No respiratory distress  Breath sounds: Normal breath sounds  Abdominal:      General: Abdomen is flat  There is no distension  Tenderness: There is no abdominal tenderness  Musculoskeletal:         General: Swelling present  Right lower leg: Edema (1-2+) present  Left lower leg: Edema (2-3+  Anterior wound which is wrapped  No erythema) present  Lymphadenopathy:      Cervical: No cervical adenopathy  Skin:     General: Skin is warm  Capillary Refill: Capillary refill takes less than 2 seconds  Coloration: Skin is not jaundiced  Findings: No bruising, erythema or rash  Neurological:      General: No focal deficit present  Mental Status: He is alert and oriented to person, place, and time  Mental status is at baseline     Psychiatric:         Mood and Affect: Mood normal          Behavior: Behavior normal           Martell Montanez MD

## 2023-04-28 NOTE — ASSESSMENT & PLAN NOTE
Lab Results   Component Value Date    EGFR 39 (L) 02/09/2023    EGFR 47 (L) 11/14/2022    EGFR 41 (L) 07/13/2022    CREATININE 1 79 (H) 02/09/2023    CREATININE 1 53 (H) 11/14/2022    CREATININE 1 70 (H) 07/13/2022   Cautious use of diuretic therapy

## 2023-04-28 NOTE — PATIENT INSTRUCTIONS
Medicare Preventive Visit Patient Instructions  Thank you for completing your Welcome to Medicare Visit or Medicare Annual Wellness Visit today  Your next wellness visit will be due in one year (4/28/2024)  The screening/preventive services that you may require over the next 5-10 years are detailed below  Some tests may not apply to you based off risk factors and/or age  Screening tests ordered at today's visit but not completed yet may show as past due  Also, please note that scanned in results may not display below  Preventive Screenings:  Service Recommendations Previous Testing/Comments   Colorectal Cancer Screening  · Colonoscopy    · Fecal Occult Blood Test (FOBT)/Fecal Immunochemical Test (FIT)  · Fecal DNA/Cologuard Test  · Flexible Sigmoidoscopy Age: 39-70 years old   Colonoscopy: every 10 years (May be performed more frequently if at higher risk)  OR  FOBT/FIT: every 1 year  OR  Cologuard: every 3 years  OR  Sigmoidoscopy: every 5 years  Screening may be recommended earlier than age 39 if at higher risk for colorectal cancer  Also, an individualized decision between you and your healthcare provider will decide whether screening between the ages of 74-80 would be appropriate   Colonoscopy: 10/08/2018  FOBT/FIT: Not on file  Cologuard: Not on file  Sigmoidoscopy: Not on file    Screening Current     Prostate Cancer Screening Individualized decision between patient and health care provider in men between ages of 53-78   Medicare will cover every 12 months beginning on the day after your 50th birthday PSA: No results in last 5 years     Screening Not Indicated     Hepatitis C Screening Once for adults born between 1945 and 1965  More frequently in patients at high risk for Hepatitis C Hep C Antibody: 08/05/2019    Screening Current   Diabetes Screening 1-2 times per year if you're at risk for diabetes or have pre-diabetes Fasting glucose: 113 mg/dL (8/5/2019)  A1C: 5 1 % of total Hgb (12/9/2020)  Screening Current   Cholesterol Screening Once every 5 years if you don't have a lipid disorder  May order more often based on risk factors  Lipid panel: 12/09/2020  Screening Current      Other Preventive Screenings Covered by Medicare:  1  Abdominal Aortic Aneurysm (AAA) Screening: covered once if your at risk  You're considered to be at risk if you have a family history of AAA or a male between the age of 73-68 who smoking at least 100 cigarettes in your lifetime  2  Lung Cancer Screening: covers low dose CT scan once per year if you meet all of the following conditions: (1) Age 50-69; (2) No signs or symptoms of lung cancer; (3) Current smoker or have quit smoking within the last 15 years; (4) You have a tobacco smoking history of at least 20 pack years (packs per day x number of years you smoked); (5) You get a written order from a healthcare provider  3  Glaucoma Screening: covered annually if you're considered high risk: (1) You have diabetes OR (2) Family history of glaucoma OR (3)  aged 48 and older OR (3)  American aged 72 and older  3  Osteoporosis Screening: covered every 2 years if you meet one of the following conditions: (1) Have a vertebral abnormality; (2) On glucocorticoid therapy for more than 3 months; (3) Have primary hyperparathyroidism; (4) On osteoporosis medications and need to assess response to drug therapy  5  HIV Screening: covered annually if you're between the age of 12-76  Also covered annually if you are younger than 13 and older than 72 with risk factors for HIV infection  For pregnant patients, it is covered up to 3 times per pregnancy      Immunizations:  Immunization Recommendations   Influenza Vaccine Annual influenza vaccination during flu season is recommended for all persons aged >= 6 months who do not have contraindications   Pneumococcal Vaccine   * Pneumococcal conjugate vaccine = PCV13 (Prevnar 13), PCV15 (Vaxneuvance), PCV20 (Prevnar 20)  * Pneumococcal polysaccharide vaccine = PPSV23 (Pneumovax) Adults 2364 years old: 1-3 doses may be recommended based on certain risk factors  Adults 72 years old: 1-2 doses may be recommended based off what pneumonia vaccine you previously received   Hepatitis B Vaccine 3 dose series if at intermediate or high risk (ex: diabetes, end stage renal disease, liver disease)   Tetanus (Td) Vaccine - COST NOT COVERED BY MEDICARE PART B Following completion of primary series, a booster dose should be given every 10 years to maintain immunity against tetanus  Td may also be given as tetanus wound prophylaxis  Tdap Vaccine - COST NOT COVERED BY MEDICARE PART B Recommended at least once for all adults  For pregnant patients, recommended with each pregnancy  Shingles Vaccine (Shingrix) - COST NOT COVERED BY MEDICARE PART B  2 shot series recommended in those aged 48 and above     Health Maintenance Due:      Topic Date Due   • Colorectal Cancer Screening  10/08/2023   • Hepatitis C Screening  Completed     Immunizations Due:      Topic Date Due   • COVID-19 Vaccine (5 - Booster) 12/17/2021     Advance Directives   What are advance directives? Advance directives are legal documents that state your wishes and plans for medical care  These plans are made ahead of time in case you lose your ability to make decisions for yourself  Advance directives can apply to any medical decision, such as the treatments you want, and if you want to donate organs  What are the types of advance directives? There are many types of advance directives, and each state has rules about how to use them  You may choose a combination of any of the following:  · Living will: This is a written record of the treatment you want  You can also choose which treatments you do not want, which to limit, and which to stop at a certain time  This includes surgery, medicine, IV fluid, and tube feedings  · Durable power of  for healthcare [facility]):   This is a written record that states who you want to make healthcare choices for you when you are unable to make them for yourself  This person, called a proxy, is usually a family member or a friend  You may choose more than 1 proxy  · Do not resuscitate (DNR) order:  A DNR order is used in case your heart stops beating or you stop breathing  It is a request not to have certain forms of treatment, such as CPR  A DNR order may be included in other types of advance directives  · Medical directive: This covers the care that you want if you are in a coma, near death, or unable to make decisions for yourself  You can list the treatments you want for each condition  Treatment may include pain medicine, surgery, blood transfusions, dialysis, IV or tube feedings, and a ventilator (breathing machine)  · Values history: This document has questions about your views, beliefs, and how you feel and think about life  This information can help others choose the care that you would choose  Why are advance directives important? An advance directive helps you control your care  Although spoken wishes may be used, it is better to have your wishes written down  Spoken wishes can be misunderstood, or not followed  Treatments may be given even if you do not want them  An advance directive may make it easier for your family to make difficult choices about your care  Weight Management   Why it is important to manage your weight:  Being overweight increases your risk of health conditions such as heart disease, high blood pressure, type 2 diabetes, and certain types of cancer  It can also increase your risk for osteoarthritis, sleep apnea, and other respiratory problems  Aim for a slow, steady weight loss  Even a small amount of weight loss can lower your risk of health problems  How to lose weight safely:  A safe and healthy way to lose weight is to eat fewer calories and get regular exercise   You can lose up about 1 pound a week by decreasing the number of calories you eat by 500 calories each day  Healthy meal plan for weight management:  A healthy meal plan includes a variety of foods, contains fewer calories, and helps you stay healthy  A healthy meal plan includes the following:  · Eat whole-grain foods more often  A healthy meal plan should contain fiber  Fiber is the part of grains, fruits, and vegetables that is not broken down by your body  Whole-grain foods are healthy and provide extra fiber in your diet  Some examples of whole-grain foods are whole-wheat breads and pastas, oatmeal, brown rice, and bulgur  · Eat a variety of vegetables every day  Include dark, leafy greens such as spinach, kale, debbie greens, and mustard greens  Eat yellow and orange vegetables such as carrots, sweet potatoes, and winter squash  · Eat a variety of fruits every day  Choose fresh or canned fruit (canned in its own juice or light syrup) instead of juice  Fruit juice has very little or no fiber  · Eat low-fat dairy foods  Drink fat-free (skim) milk or 1% milk  Eat fat-free yogurt and low-fat cottage cheese  Try low-fat cheeses such as mozzarella and other reduced-fat cheeses  · Choose meat and other protein foods that are low in fat  Choose beans or other legumes such as split peas or lentils  Choose fish, skinless poultry (chicken or turkey), or lean cuts of red meat (beef or pork)  Before you cook meat or poultry, cut off any visible fat  · Use less fat and oil  Try baking foods instead of frying them  Add less fat, such as margarine, sour cream, regular salad dressing and mayonnaise to foods  Eat fewer high-fat foods  Some examples of high-fat foods include french fries, doughnuts, ice cream, and cakes  · Eat fewer sweets  Limit foods and drinks that are high in sugar  This includes candy, cookies, regular soda, and sweetened drinks  Exercise:  Exercise at least 30 minutes per day on most days of the week   Some examples of exercise include walking, biking, dancing, and swimming  You can also fit in more physical activity by taking the stairs instead of the elevator or parking farther away from stores  Ask your healthcare provider about the best exercise plan for you  © Copyright [employer] 2018 Information is for End User's use only and may not be sold, redistributed or otherwise used for commercial purposes   All illustrations and images included in CareNotes® are the copyrighted property of A [employer] M , [employer]  or [address] [place] [address]

## 2023-04-28 NOTE — ASSESSMENT & PLAN NOTE
Lab Results   Component Value Date    EGFR 39 (L) 02/09/2023    EGFR 47 (L) 11/14/2022    EGFR 41 (L) 07/13/2022    CREATININE 1 79 (H) 02/09/2023    CREATININE 1 53 (H) 11/14/2022    CREATININE 1 70 (H) 07/13/2022   Need to monitor diuretic use judiciously with his renal function status of stage IIIb kidney disease Preparation Of Recipient Site - Graft: The eschar was removed surgically with sharp dissection to facilitate appropriate survival of the following graft.

## 2023-06-13 DIAGNOSIS — N18.31 STAGE 3A CHRONIC KIDNEY DISEASE (HCC): ICD-10-CM

## 2023-06-13 DIAGNOSIS — M16.12 PRIMARY OSTEOARTHRITIS OF LEFT HIP: ICD-10-CM

## 2023-06-13 DIAGNOSIS — R60.9 PERIPHERAL EDEMA: ICD-10-CM

## 2023-06-13 DIAGNOSIS — M16.9 OSTEOARTHRITIS OF HIP, UNSPECIFIED LATERALITY, UNSPECIFIED OSTEOARTHRITIS TYPE: ICD-10-CM

## 2023-06-13 RX ORDER — TORSEMIDE 10 MG/1
10 TABLET ORAL DAILY
Qty: 90 TABLET | Refills: 1 | Status: SHIPPED | OUTPATIENT
Start: 2023-06-13

## 2023-06-13 RX ORDER — TRAMADOL HYDROCHLORIDE 50 MG/1
50 TABLET ORAL EVERY 6 HOURS PRN
Qty: 120 TABLET | Refills: 0 | Status: SHIPPED | OUTPATIENT
Start: 2023-06-13

## 2023-07-31 DIAGNOSIS — M16.9 OSTEOARTHRITIS OF HIP, UNSPECIFIED LATERALITY, UNSPECIFIED OSTEOARTHRITIS TYPE: ICD-10-CM

## 2023-07-31 DIAGNOSIS — M16.12 PRIMARY OSTEOARTHRITIS OF LEFT HIP: ICD-10-CM

## 2023-08-01 LAB
ALBUMIN SERPL-MCNC: 4.3 G/DL (ref 3.6–5.1)
ALBUMIN/GLOB SERPL: 1.7 (CALC) (ref 1–2.5)
ALP SERPL-CCNC: 79 U/L (ref 35–144)
ALT SERPL-CCNC: 7 U/L (ref 9–46)
AST SERPL-CCNC: 12 U/L (ref 10–35)
BASOPHILS # BLD AUTO: 58 CELLS/UL (ref 0–200)
BASOPHILS NFR BLD AUTO: 0.7 %
BILIRUB SERPL-MCNC: 1.7 MG/DL (ref 0.2–1.2)
BUN SERPL-MCNC: 23 MG/DL (ref 7–25)
BUN/CREAT SERPL: 13 (CALC) (ref 6–22)
CALCIUM SERPL-MCNC: 9.7 MG/DL (ref 8.6–10.3)
CHLORIDE SERPL-SCNC: 105 MMOL/L (ref 98–110)
CO2 SERPL-SCNC: 28 MMOL/L (ref 20–32)
CREAT SERPL-MCNC: 1.72 MG/DL (ref 0.7–1.28)
EOSINOPHIL # BLD AUTO: 249 CELLS/UL (ref 15–500)
EOSINOPHIL NFR BLD AUTO: 3 %
ERYTHROCYTE [DISTWIDTH] IN BLOOD BY AUTOMATED COUNT: 13.1 % (ref 11–15)
GFR/BSA.PRED SERPLBLD CYS-BASED-ARV: 40 ML/MIN/1.73M2
GLOBULIN SER CALC-MCNC: 2.6 G/DL (CALC) (ref 1.9–3.7)
GLUCOSE SERPL-MCNC: 104 MG/DL (ref 65–99)
HCT VFR BLD AUTO: 40.6 % (ref 38.5–50)
HGB BLD-MCNC: 14.2 G/DL (ref 13.2–17.1)
LYMPHOCYTES # BLD AUTO: 1843 CELLS/UL (ref 850–3900)
LYMPHOCYTES NFR BLD AUTO: 22.2 %
MCH RBC QN AUTO: 30.7 PG (ref 27–33)
MCHC RBC AUTO-ENTMCNC: 35 G/DL (ref 32–36)
MCV RBC AUTO: 87.9 FL (ref 80–100)
MONOCYTES # BLD AUTO: 540 CELLS/UL (ref 200–950)
MONOCYTES NFR BLD AUTO: 6.5 %
NEUTROPHILS # BLD AUTO: 5611 CELLS/UL (ref 1500–7800)
NEUTROPHILS NFR BLD AUTO: 67.6 %
PLATELET # BLD AUTO: 321 THOUSAND/UL (ref 140–400)
PMV BLD REES-ECKER: 9.3 FL (ref 7.5–12.5)
POTASSIUM SERPL-SCNC: 4.5 MMOL/L (ref 3.5–5.3)
PROT SERPL-MCNC: 6.9 G/DL (ref 6.1–8.1)
PSA SERPL-MCNC: 3.93 NG/ML
RBC # BLD AUTO: 4.62 MILLION/UL (ref 4.2–5.8)
SODIUM SERPL-SCNC: 141 MMOL/L (ref 135–146)
WBC # BLD AUTO: 8.3 THOUSAND/UL (ref 3.8–10.8)

## 2023-08-01 RX ORDER — TRAMADOL HYDROCHLORIDE 50 MG/1
50 TABLET ORAL EVERY 6 HOURS PRN
Qty: 120 TABLET | Refills: 0 | Status: SHIPPED | OUTPATIENT
Start: 2023-08-01

## 2023-08-28 ENCOUNTER — OFFICE VISIT (OUTPATIENT)
Dept: INTERNAL MEDICINE CLINIC | Age: 78
End: 2023-08-28
Payer: MEDICARE

## 2023-08-28 VITALS
HEIGHT: 66 IN | OXYGEN SATURATION: 98 % | WEIGHT: 193 LBS | DIASTOLIC BLOOD PRESSURE: 76 MMHG | SYSTOLIC BLOOD PRESSURE: 120 MMHG | TEMPERATURE: 98.1 F | BODY MASS INDEX: 31.02 KG/M2 | HEART RATE: 108 BPM

## 2023-08-28 DIAGNOSIS — Z87.39 HISTORY OF GOUT: ICD-10-CM

## 2023-08-28 DIAGNOSIS — M16.12 PRIMARY OSTEOARTHRITIS OF LEFT HIP: ICD-10-CM

## 2023-08-28 DIAGNOSIS — N18.32 STAGE 3B CHRONIC KIDNEY DISEASE (HCC): ICD-10-CM

## 2023-08-28 DIAGNOSIS — R73.01 ELEVATED FASTING GLUCOSE: ICD-10-CM

## 2023-08-28 DIAGNOSIS — R00.0 TACHYCARDIA: ICD-10-CM

## 2023-08-28 DIAGNOSIS — R60.0 EDEMA OF LEFT LOWER LEG: ICD-10-CM

## 2023-08-28 DIAGNOSIS — R97.20 ELEVATED PSA: ICD-10-CM

## 2023-08-28 DIAGNOSIS — L20.89 OTHER ATOPIC DERMATITIS: ICD-10-CM

## 2023-08-28 DIAGNOSIS — Z86.010 HISTORY OF COLON POLYPS: Primary | ICD-10-CM

## 2023-08-28 PROBLEM — N18.4 STAGE 4 CHRONIC KIDNEY DISEASE (HCC): Status: RESOLVED | Noted: 2023-04-28 | Resolved: 2023-08-28

## 2023-08-28 PROBLEM — L03.116 LEFT LEG CELLULITIS: Status: RESOLVED | Noted: 2023-03-10 | Resolved: 2023-08-28

## 2023-08-28 PROCEDURE — 99214 OFFICE O/P EST MOD 30 MIN: CPT | Performed by: INTERNAL MEDICINE

## 2023-08-28 PROCEDURE — 93000 ELECTROCARDIOGRAM COMPLETE: CPT | Performed by: INTERNAL MEDICINE

## 2023-08-28 RX ORDER — MOMETASONE FUROATE 1 MG/G
CREAM TOPICAL DAILY
Qty: 45 G | Refills: 0 | Status: SHIPPED | OUTPATIENT
Start: 2023-08-28

## 2023-08-28 NOTE — PROGRESS NOTES
Name: Danica Martinez      : 1945      MRN: [id number]  Encounter Provider: Grecia Blackman MD  Encounter Date: 2023   Encounter department: [address] [facility]     1. History of colon polyps    2. Elevated PSA  Assessment & Plan:  Most recent PSA is significantly improved from previous. Elevation possibly secondary to catheter manipulation. Orders:  -     PSA Total, Diagnostic; Future; Expected date: 2024    3. Elevated fasting glucose  Assessment & Plan: We will continue to monitor for onset of diabetes. Previous fasting sugars have been normal.  Most recent fasting blood sugar 104. Orders:  -     Comprehensive metabolic panel; Future; Expected date: 2024    4. Primary osteoarthritis of left hip  Assessment & Plan:  Patient is able to tolerate the discomfort. He has no desire to consider orthopedic surgery at this point    Orders:  -     CBC and differential; Future; Expected date: 2024    5. Stage 3b chronic kidney disease [facility])  Assessment & Plan:  Lab Results   Component Value Date    EGFR 40 (L) 2023    EGFR 39 (L) 2023    EGFR 47 (L) 2022    CREATININE 1.72 (H) 2023    CREATININE 1.79 (H) 2023    CREATININE 1.53 (H) 2022   Renal function has remained relatively stable on current diuretic dosing    Orders:  -     CBC and differential; Future; Expected date: 2024  -     Comprehensive metabolic panel; Future; Expected date: 2024  -     Albumin / creatinine urine ratio; Future; Expected date: 2024    6. Tachycardia  Assessment & Plan:  Initial heart rate assessed at 108/min with occasional pauses. EKG shows normal sinus rhythm with no acute changes. We will continue to monitor. Orders:  -     POCT ECG  -     CBC and differential; Future; Expected date: 2024    7.  Edema of left lower leg  Assessment & Plan:  Bilateral lower extremity edema but much worse in the left lower leg. No current evidence of cellulitis. Encouraged the use of compression socks    Orders:  -     CBC and differential; Future; Expected date: 01/22/2024    8. History of gout  Assessment & Plan:  Continues on allopurinol daily. We will obtain a follow-up uric acid level in 6 months    Orders:  -     CBC and differential; Future; Expected date: 01/22/2024  -     Uric acid; Future; Expected date: 01/22/2024    9. Other atopic dermatitis  Assessment & Plan:  Mometasone was refilled. Orders:  -     mometasone (ELOCON) 0.1 % cream; Apply topically daily         Subjective      The patient presents to the office for follow-up visit. In general he is doing well and has no major complaints. His arthritic pain continues but the patient states it is tolerable and he does not feel that he needs to have hip replacement surgery at this time. He has persistent lower extremity edema worse on the left than the right. He does complain of intermittent severe pruritus on the lateral aspect of his right lower extremity. Mometasone has been helpful in the past but he no longer has that prescription. Recent labs show some stabilization of his renal function with EGFR of 40. Fasting glucose was minimally elevated at 104. His CBC was within normal limits and his PSA which had been previously elevated is now less than 4. Most likely cause of PSA elevation may have been catheter manipulation through an enlarged prostate. He does have his Nolasco catheter changed on a monthly basis. Review of Systems   Constitutional: Negative. HENT: Negative. Eyes: Negative. Respiratory: Negative. Cardiovascular: Positive for leg swelling (Bilateral lower extremity edema left worse than right). Gastrointestinal: Negative. Endocrine: Negative. Genitourinary:        Chronic indwelling Nolasco catheter for neurogenic bladder. Musculoskeletal: Positive for arthralgias (Left hip with severe arthritis).    Skin: Negative. Allergic/Immunologic: Negative. Neurological: Negative. Hematological: Negative. Psychiatric/Behavioral: Negative. Current Outpatient Medications on File Prior to Visit   Medication Sig   • acetaminophen (TYLENOL) 500 mg tablet Take 1 tablet (500 mg total) by mouth every 6 (six) hours as needed for mild pain   • allopurinol (ZYLOPRIM) 300 mg tablet Take 1 tablet (300 mg total) by mouth daily   • torsemide (DEMADEX) 10 mg tablet Take 1 tablet (10 mg total) by mouth daily   • traMADol (Ultram) 50 mg tablet Take 1 tablet (50 mg total) by mouth every 6 (six) hours as needed for moderate pain       Objective     /76 (BP Location: Left arm, Patient Position: Sitting, Cuff Size: Standard)   Pulse (!) 108   Temp 98.1 °F (36.7 °C) (Tympanic)   Ht 5' 6.22" (1.682 m)   Wt 87.5 kg (193 lb)   SpO2 98%   BMI 30.94 kg/m²     Physical Exam  Vitals reviewed. Constitutional:       General: He is not in acute distress. Appearance: Normal appearance. He is not ill-appearing, toxic-appearing or diaphoretic. HENT:      Head: Normocephalic and atraumatic. Right Ear: External ear normal.      Left Ear: External ear normal.      Mouth/Throat:      Mouth: Mucous membranes are moist.   Eyes:      General: No scleral icterus. Extraocular Movements: Extraocular movements intact. Conjunctiva/sclera: Conjunctivae normal.      Pupils: Pupils are equal, round, and reactive to light. Cardiovascular:      Rate and Rhythm: Regular rhythm. Tachycardia present. Pulses: Normal pulses. Heart sounds: Normal heart sounds. No murmur heard. Pulmonary:      Effort: Pulmonary effort is normal. No respiratory distress. Breath sounds: Normal breath sounds. Abdominal:      General: Abdomen is flat. Bowel sounds are normal. There is no distension. Tenderness: There is no abdominal tenderness.    Genitourinary:     Comments: Chronic indwelling Nolasco catheter  Musculoskeletal: General: Tenderness (Left hip when ambulating) present. Right lower leg: Edema (1+ pretibial edema) present. Left lower leg: Edema (3+ pretibial edema) present. Skin:     General: Skin is warm. Capillary Refill: Capillary refill takes less than 2 seconds. Coloration: Skin is not jaundiced. Findings: No bruising, erythema or rash. Neurological:      General: No focal deficit present. Mental Status: He is alert and oriented to person, place, and time. Mental status is at baseline. Gait: Gait abnormal (Secondary to degenerative osteoarthritis of the left hip). Psychiatric:         Mood and Affect: Mood normal.         Behavior: Behavior normal.         Thought Content: Thought content normal.         Judgment: Judgment normal.     EKG: normal EKG, normal sinus rhythm.     Cesar Peres MD

## 2023-08-28 NOTE — ASSESSMENT & PLAN NOTE
We will continue to monitor for onset of diabetes. Previous fasting sugars have been normal.  Most recent fasting blood sugar 104.

## 2023-08-28 NOTE — ASSESSMENT & PLAN NOTE
Bilateral lower extremity edema but much worse in the left lower leg. No current evidence of cellulitis.   Encouraged the use of compression socks

## 2023-08-28 NOTE — ASSESSMENT & PLAN NOTE
Patient is able to tolerate the discomfort.   He has no desire to consider orthopedic surgery at this point

## 2023-08-28 NOTE — ASSESSMENT & PLAN NOTE
Most recent PSA is significantly improved from previous. Elevation possibly secondary to catheter manipulation.

## 2023-08-28 NOTE — ASSESSMENT & PLAN NOTE
Lab Results   Component Value Date    EGFR 40 (L) 08/01/2023    EGFR 39 (L) 02/09/2023    EGFR 47 (L) 11/14/2022    CREATININE 1.72 (H) 08/01/2023    CREATININE 1.79 (H) 02/09/2023    CREATININE 1.53 (H) 11/14/2022   Renal function has remained relatively stable on current diuretic dosing

## 2023-08-28 NOTE — ASSESSMENT & PLAN NOTE
Initial heart rate assessed at 108/min with occasional pauses. EKG shows normal sinus rhythm with no acute changes. We will continue to monitor.

## 2023-10-18 DIAGNOSIS — M16.9 OSTEOARTHRITIS OF HIP, UNSPECIFIED LATERALITY, UNSPECIFIED OSTEOARTHRITIS TYPE: ICD-10-CM

## 2023-10-18 DIAGNOSIS — M16.12 PRIMARY OSTEOARTHRITIS OF LEFT HIP: ICD-10-CM

## 2023-10-19 RX ORDER — TRAMADOL HYDROCHLORIDE 50 MG/1
50 TABLET ORAL EVERY 6 HOURS PRN
Qty: 120 TABLET | Refills: 0 | Status: SHIPPED | OUTPATIENT
Start: 2023-10-19

## 2023-11-13 DIAGNOSIS — M10.9 GOUT, ARTHROPATHY: ICD-10-CM

## 2023-11-13 RX ORDER — ALLOPURINOL 300 MG/1
300 TABLET ORAL DAILY
Qty: 90 TABLET | Refills: 1 | Status: SHIPPED | OUTPATIENT
Start: 2023-11-13

## 2023-11-29 DIAGNOSIS — N18.31 STAGE 3A CHRONIC KIDNEY DISEASE (HCC): ICD-10-CM

## 2023-11-29 DIAGNOSIS — R60.9 PERIPHERAL EDEMA: ICD-10-CM

## 2023-11-29 RX ORDER — TORSEMIDE 10 MG/1
10 TABLET ORAL DAILY
Qty: 90 TABLET | Refills: 1 | Status: SHIPPED | OUTPATIENT
Start: 2023-11-29

## 2024-01-19 DIAGNOSIS — M16.12 PRIMARY OSTEOARTHRITIS OF LEFT HIP: ICD-10-CM

## 2024-01-19 DIAGNOSIS — M16.9 OSTEOARTHRITIS OF HIP, UNSPECIFIED LATERALITY, UNSPECIFIED OSTEOARTHRITIS TYPE: ICD-10-CM

## 2024-01-19 RX ORDER — TRAMADOL HYDROCHLORIDE 50 MG/1
50 TABLET ORAL EVERY 6 HOURS PRN
Qty: 120 TABLET | Refills: 0 | Status: SHIPPED | OUTPATIENT
Start: 2024-01-19

## 2024-01-31 LAB
ALBUMIN SERPL-MCNC: 4.6 G/DL (ref 3.6–5.1)
ALBUMIN/CREAT UR: 20 MCG/MG CREAT
ALBUMIN/GLOB SERPL: 2.1 (CALC) (ref 1–2.5)
ALP SERPL-CCNC: 73 U/L (ref 35–144)
ALT SERPL-CCNC: 15 U/L (ref 9–46)
AST SERPL-CCNC: 17 U/L (ref 10–35)
BASOPHILS # BLD AUTO: 83 CELLS/UL (ref 0–200)
BASOPHILS NFR BLD AUTO: 0.9 %
BILIRUB SERPL-MCNC: 1.3 MG/DL (ref 0.2–1.2)
BUN SERPL-MCNC: 29 MG/DL (ref 7–25)
BUN/CREAT SERPL: 16 (CALC) (ref 6–22)
CALCIUM SERPL-MCNC: 9 MG/DL (ref 8.6–10.3)
CHLORIDE SERPL-SCNC: 103 MMOL/L (ref 98–110)
CO2 SERPL-SCNC: 26 MMOL/L (ref 20–32)
CREAT SERPL-MCNC: 1.87 MG/DL (ref 0.7–1.28)
CREAT UR-MCNC: 35 MG/DL (ref 20–320)
EOSINOPHIL # BLD AUTO: 331 CELLS/UL (ref 15–500)
EOSINOPHIL NFR BLD AUTO: 3.6 %
ERYTHROCYTE [DISTWIDTH] IN BLOOD BY AUTOMATED COUNT: 12.7 % (ref 11–15)
GFR/BSA.PRED SERPLBLD CYS-BASED-ARV: 37 ML/MIN/1.73M2
GLOBULIN SER CALC-MCNC: 2.2 G/DL (CALC) (ref 1.9–3.7)
GLUCOSE SERPL-MCNC: 81 MG/DL (ref 65–99)
HCT VFR BLD AUTO: 40.9 % (ref 38.5–50)
HGB BLD-MCNC: 13.9 G/DL (ref 13.2–17.1)
LYMPHOCYTES # BLD AUTO: 3386 CELLS/UL (ref 850–3900)
LYMPHOCYTES NFR BLD AUTO: 36.8 %
MCH RBC QN AUTO: 30.8 PG (ref 27–33)
MCHC RBC AUTO-ENTMCNC: 34 G/DL (ref 32–36)
MCV RBC AUTO: 90.5 FL (ref 80–100)
MICROALBUMIN UR-MCNC: 0.7 MG/DL
MONOCYTES # BLD AUTO: 681 CELLS/UL (ref 200–950)
MONOCYTES NFR BLD AUTO: 7.4 %
NEUTROPHILS # BLD AUTO: 4720 CELLS/UL (ref 1500–7800)
NEUTROPHILS NFR BLD AUTO: 51.3 %
PLATELET # BLD AUTO: 293 THOUSAND/UL (ref 140–400)
PMV BLD REES-ECKER: 9.2 FL (ref 7.5–12.5)
POTASSIUM SERPL-SCNC: 3.9 MMOL/L (ref 3.5–5.3)
PROT SERPL-MCNC: 6.8 G/DL (ref 6.1–8.1)
PSA SERPL-MCNC: 4.16 NG/ML
RBC # BLD AUTO: 4.52 MILLION/UL (ref 4.2–5.8)
SODIUM SERPL-SCNC: 139 MMOL/L (ref 135–146)
URATE SERPL-MCNC: 4.5 MG/DL (ref 4–8)
WBC # BLD AUTO: 9.2 THOUSAND/UL (ref 3.8–10.8)

## 2024-02-07 ENCOUNTER — RA CDI HCC (OUTPATIENT)
Dept: OTHER | Facility: HOSPITAL | Age: 79
End: 2024-02-07

## 2024-02-12 ENCOUNTER — OFFICE VISIT (OUTPATIENT)
Age: 79
End: 2024-02-12
Payer: MEDICARE

## 2024-02-12 VITALS
OXYGEN SATURATION: 97 % | DIASTOLIC BLOOD PRESSURE: 78 MMHG | TEMPERATURE: 99.3 F | SYSTOLIC BLOOD PRESSURE: 142 MMHG | BODY MASS INDEX: 31.72 KG/M2 | HEART RATE: 96 BPM | WEIGHT: 197.4 LBS | HEIGHT: 66 IN

## 2024-02-12 DIAGNOSIS — N31.9 URINARY BLADDER NEUROGENIC DYSFUNCTION: Primary | ICD-10-CM

## 2024-02-12 DIAGNOSIS — Z87.39 HISTORY OF GOUT: ICD-10-CM

## 2024-02-12 DIAGNOSIS — F11.90 CHRONIC, CONTINUOUS USE OF OPIOIDS: ICD-10-CM

## 2024-02-12 DIAGNOSIS — N18.32 STAGE 3B CHRONIC KIDNEY DISEASE (HCC): ICD-10-CM

## 2024-02-12 DIAGNOSIS — M16.12 PRIMARY OSTEOARTHRITIS OF LEFT HIP: ICD-10-CM

## 2024-02-12 PROBLEM — M25.521 RIGHT ELBOW PAIN: Status: RESOLVED | Noted: 2021-02-15 | Resolved: 2024-02-12

## 2024-02-12 PROBLEM — S80.12XA TRAUMATIC HEMATOMA OF LEFT LOWER LEG: Status: RESOLVED | Noted: 2022-12-15 | Resolved: 2024-02-12

## 2024-02-12 PROCEDURE — 99214 OFFICE O/P EST MOD 30 MIN: CPT | Performed by: INTERNAL MEDICINE

## 2024-02-12 NOTE — PROGRESS NOTES
Name: Lázaro Champion      : 1945      MRN: [id number]  Encounter Provider: Urbano Bird MD  Encounter Date: 2024   Encounter department: [facility] [place]    Assessment & Plan     1. Urinary bladder neurogenic dysfunction  Assessment & Plan:  Continues with chronic Nolasco catheter.  Follows with [facility] urology.      2. Stage 3b chronic kidney disease (HCC)  Assessment & Plan:  Lab Results   Component Value Date    EGFR 37 (L) 2024    EGFR 40 (L) 2023    EGFR 38 (L) 03/10/2023    CREATININE 1.87 (H) 2024    CREATININE 1.72 (H) 2023    CREATININE 1.81 (H) 03/10/2023   Renal function has remained relatively stable on current dosing of diuretics.    Orders:  -     CBC and differential; Future; Expected date: 2024  -     Comprehensive metabolic panel; Future; Expected date: 2024    3. Primary osteoarthritis of left hip  Assessment & Plan:  Patient tolerates the pain.  He uses a combination of acetaminophen and tramadol as needed for severe discomfort.    Orders:  -     CBC and differential; Future; Expected date: 2024  -     Comprehensive metabolic panel; Future; Expected date: 2024    4. History of gout  Assessment & Plan:  Asymptomatic.  Uric acid level normal at 4.5    Orders:  -     CBC and differential; Future; Expected date: 2024  -     Comprehensive metabolic panel; Future; Expected date: 2024  -     Uric acid; Future; Expected date: 2024    5. Chronic, continuous use of opioids  Assessment & Plan:  Utilizes tramadol in conjunction with Tylenol arthritis formula for his chronic left hip osteoarthritis.             Subjective      Patient presents to the office for follow-up visit.  He has no new complaints.  He is dealing with a chronic Nolasco catheter.  He follows with [facility] urology.  He denies any complaints.  His left hip pain is chronic.  He does not wish to pursue left hip replacement.  He  "is managing quite well utilizing extra strength Tylenol and tramadol.  He has no complaints of any shortness of breath.  He has chronic peripheral edema bilaterally with his left lower extremity worse than his right.  He denies any shortness of breath.  Gout has been nicely controlled.  Uric acid level was 4.5.  Metabolic panel is consistent with his stage IIIb chronic kidney disease.  Disease within normal limits.  His PSA is mildly elevated at 4.16. which is significantly lower than previously      Review of Systems   Constitutional: Negative.    HENT: Negative.     Eyes: Negative.    Respiratory: Negative.     Cardiovascular:  Positive for leg swelling (Chronic peripheral edema, left greater than right).   Gastrointestinal: Negative.    Endocrine: Negative.    Genitourinary:         Chronic indwelling Nolasco catheter.   Musculoskeletal:  Positive for arthralgias (Severe left hip osteoarthritis).   Skin: Negative.    Allergic/Immunologic: Negative.    Neurological: Negative.    Hematological: Negative.    Psychiatric/Behavioral: Negative.         Current Outpatient Medications on File Prior to Visit   Medication Sig    acetaminophen (TYLENOL) 500 mg tablet Take 1 tablet (500 mg total) by mouth every 6 (six) hours as needed for mild pain    allopurinol (ZYLOPRIM) 300 mg tablet Take 1 tablet (300 mg total) by mouth daily    mometasone (ELOCON) 0.1 % cream Apply topically daily    torsemide (DEMADEX) 10 mg tablet Take 1 tablet (10 mg total) by mouth daily    traMADol (Ultram) 50 mg tablet Take 1 tablet (50 mg total) by mouth every 6 (six) hours as needed for moderate pain       Objective     /78 (BP Location: Left arm, Patient Position: Sitting, Cuff Size: Standard)   Pulse 96   Temp 99.3 °F (37.4 °C) (Temporal)   Ht 5' 6.22\" (1.682 m)   Wt 89.5 kg (197 lb 6.4 oz)   SpO2 97%   BMI 31.65 kg/m²     Physical Exam  Urbano Bird MD    "

## 2024-02-12 NOTE — ASSESSMENT & PLAN NOTE
Patient tolerates the pain.  He uses a combination of acetaminophen and tramadol as needed for severe discomfort.

## 2024-02-12 NOTE — ASSESSMENT & PLAN NOTE
Lab Results   Component Value Date    EGFR 37 (L) 01/31/2024    EGFR 40 (L) 08/01/2023    EGFR 38 (L) 03/10/2023    CREATININE 1.87 (H) 01/31/2024    CREATININE 1.72 (H) 08/01/2023    CREATININE 1.81 (H) 03/10/2023   Renal function has remained relatively stable on current dosing of diuretics.

## 2024-02-12 NOTE — ASSESSMENT & PLAN NOTE
Utilizes tramadol in conjunction with Tylenol arthritis formula for his chronic left hip osteoarthritis.

## 2024-02-14 ENCOUNTER — TELEPHONE (OUTPATIENT)
Age: 79
End: 2024-02-14

## 2024-02-14 NOTE — TELEPHONE ENCOUNTER
Request for Billing Information ([employer]) 02/14/2023. Form scanned into media for review, given to provider.

## 2024-02-14 NOTE — TELEPHONE ENCOUNTER
Please give all billing inquiries to me directly. Don't scan them in the chart. It is a waste of time.    Dr BUCKNER

## 2024-02-21 PROBLEM — Z00.00 MEDICARE ANNUAL WELLNESS VISIT, SUBSEQUENT: Status: RESOLVED | Noted: 2018-09-17 | Resolved: 2024-02-21

## 2024-03-21 DIAGNOSIS — M16.12 PRIMARY OSTEOARTHRITIS OF LEFT HIP: ICD-10-CM

## 2024-03-21 DIAGNOSIS — M16.9 OSTEOARTHRITIS OF HIP, UNSPECIFIED LATERALITY, UNSPECIFIED OSTEOARTHRITIS TYPE: ICD-10-CM

## 2024-03-21 RX ORDER — TRAMADOL HYDROCHLORIDE 50 MG/1
50 TABLET ORAL EVERY 6 HOURS PRN
Qty: 120 TABLET | Refills: 0 | Status: SHIPPED | OUTPATIENT
Start: 2024-03-21

## 2024-05-07 DIAGNOSIS — R60.0 PERIPHERAL EDEMA: ICD-10-CM

## 2024-05-07 DIAGNOSIS — N18.31 STAGE 3A CHRONIC KIDNEY DISEASE (HCC): ICD-10-CM

## 2024-05-07 RX ORDER — TORSEMIDE 10 MG/1
10 TABLET ORAL DAILY
Qty: 90 TABLET | Refills: 1 | Status: SHIPPED | OUTPATIENT
Start: 2024-05-07

## 2024-05-14 DIAGNOSIS — M10.9 GOUT, ARTHROPATHY: ICD-10-CM

## 2024-05-15 RX ORDER — ALLOPURINOL 300 MG/1
300 TABLET ORAL DAILY
Qty: 90 TABLET | Refills: 1 | Status: SHIPPED | OUTPATIENT
Start: 2024-05-15

## 2024-05-16 DIAGNOSIS — M16.9 OSTEOARTHRITIS OF HIP, UNSPECIFIED LATERALITY, UNSPECIFIED OSTEOARTHRITIS TYPE: ICD-10-CM

## 2024-05-16 DIAGNOSIS — M16.12 PRIMARY OSTEOARTHRITIS OF LEFT HIP: ICD-10-CM

## 2024-05-16 RX ORDER — TRAMADOL HYDROCHLORIDE 50 MG/1
50 TABLET ORAL EVERY 6 HOURS PRN
Qty: 120 TABLET | Refills: 0 | Status: SHIPPED | OUTPATIENT
Start: 2024-05-16

## 2024-05-16 NOTE — TELEPHONE ENCOUNTER
Reason for call:   [x] Refill   [] Prior Auth  [] Other:     Office:   [x] PCP/Provider -   [] Specialty/Provider -     Medication: Tramadol    Dose/Frequency: 50 mg tablet taken by mouth every 6 hours PRN for moderate pain     Quantity: 120    Pharmacy: [facility]/pharmacy #[id number] - [place], [place] - [address] [phone]     Does the patient have enough for 3 days?   [] Yes   [x] No - Send as HP to POD

## 2024-06-20 NOTE — PROGRESS NOTES
Assessment/Plan:    Benign hypertension with chronic kidney disease, stage III  Lab Results   Component Value Date    EGFR 36 08/05/2019    CREATININE 1 50 (H) 12/09/2020    CREATININE 1 55 (H) 06/01/2020    CREATININE 1 68 (H) 11/11/2019   · At baseline  · Currently normotensive with current management  · Continue Zesteril daily    Other atopic dermatitis  Will order Mometasone 0 1% cream    Cough  · Recent admission for COVID 19 PNA with hypoxic resp failure that resulted in b/l PTX  · Has a chronic dry cough  · Will prescribe guaifenesin syrup       Diagnoses and all orders for this visit:    Benign hypertension with chronic kidney disease, stage III    Benign hypertension with chronic kidney disease  -     lisinopril (ZESTRIL) 5 mg tablet; Take 1 tablet (5 mg total) by mouth daily    Cough  -     guaifenesin-codeine (GUAIFENESIN AC) 100-10 MG/5ML liquid; Take 5 mL by mouth 3 (three) times a day as needed for cough    Pressure injury of sacral region, stage 3 (HCC)    Chronic kidney disease (CKD) stage G3a/A1, moderately decreased glomerular filtration rate (GFR) between 45-59 mL/min/1 73 square meter and albuminuria creatinine ratio less than 30 mg/g    Other atopic dermatitis  -     mometasone (ELOCON) 0 1 % cream; Apply topically daily          Subjective:   Chief Complaint   Patient presents with    Follow-up     follow up for Hospitalization for COVID pneumonia 12/28-1/1  Pt unwilling to schedule earlier  Pt was covid positive 12/28    Medicare Wellness Visit     SUB AWV          Patient ID: Latosha Fox is a 77 y o  male  HPI 78 yo M with h/o acute respiratory failure d/t COVID 19 PNA resulting in b/l PTX requiring chest tube placement comes to the office for a follow up visit and Medicare Wellness visit  He has no complaints except for a dry persist cough, and chronic arthralgias  No fever or chills  He used to smoke a pipe  No use of cigarettes   Recent lab work is showing stable renal Decrease your water pill, furosemide, 20 mg daily (half a tablet)   Increase metoprolol to 50 mg daily (2 tablets)     Dr. Sandy Mark [phone]      function  The following portions of the patient's history were reviewed and updated as appropriate: allergies, current medications, past family history, past medical history, past social history, past surgical history and problem list     Review of Systems   Constitutional: Negative  HENT: Negative  Eyes: Negative  Respiratory: Negative  Cardiovascular: Negative  Gastrointestinal: Negative  Endocrine: Negative  Genitourinary: Negative  Musculoskeletal: Positive for arthralgias  Skin: Negative  Allergic/Immunologic: Negative  Neurological: Positive for light-headedness  Hematological: Negative  Psychiatric/Behavioral: Negative  Objective:      /60 (BP Location: Left arm, Patient Position: Sitting, Cuff Size: Adult)   Pulse 105   Temp 98 5 °F (36 9 °C) (Temporal)   Ht 5' 6 5" (1 689 m)   Wt 82 7 kg (182 lb 6 4 oz)   SpO2 98% Comment: ra  BMI 29 00 kg/m²          Physical Exam  Constitutional:       General: He is awake  He is not in acute distress  Appearance: Normal appearance  He is overweight  HENT:      Head: Normocephalic and atraumatic  Right Ear: Hearing normal       Mouth/Throat:      Mouth: Mucous membranes are moist    Eyes:      General: Lids are normal       Conjunctiva/sclera: Conjunctivae normal    Neck:      Musculoskeletal: Normal range of motion and neck supple  Cardiovascular:      Rate and Rhythm: Normal rate and regular rhythm  Heart sounds: Normal heart sounds, S1 normal and S2 normal  No murmur  Pulmonary:      Effort: Pulmonary effort is normal    Abdominal:      General: Bowel sounds are normal       Palpations: Abdomen is soft  Tenderness: There is no abdominal tenderness  Musculoskeletal:      Right lower leg: No edema  Left lower leg: No edema  Skin:     General: Skin is warm and dry  Findings: No rash  Neurological:      Mental Status: He is alert and oriented to person, place, and time  Psychiatric:         Mood and Affect: Mood normal          Behavior: Behavior normal  Behavior is cooperative

## 2024-07-08 DIAGNOSIS — R60.0 PERIPHERAL EDEMA: ICD-10-CM

## 2024-07-08 DIAGNOSIS — N18.31 STAGE 3A CHRONIC KIDNEY DISEASE (HCC): ICD-10-CM

## 2024-07-08 DIAGNOSIS — M16.9 OSTEOARTHRITIS OF HIP, UNSPECIFIED LATERALITY, UNSPECIFIED OSTEOARTHRITIS TYPE: ICD-10-CM

## 2024-07-08 DIAGNOSIS — M16.12 PRIMARY OSTEOARTHRITIS OF LEFT HIP: ICD-10-CM

## 2024-07-08 RX ORDER — TORSEMIDE 10 MG/1
10 TABLET ORAL DAILY
Qty: 90 TABLET | Refills: 1 | Status: CANCELLED | OUTPATIENT
Start: 2024-07-08

## 2024-07-09 RX ORDER — TRAMADOL HYDROCHLORIDE 50 MG/1
50 TABLET ORAL EVERY 6 HOURS PRN
Qty: 120 TABLET | Refills: 0 | Status: SHIPPED | OUTPATIENT
Start: 2024-07-09

## 2024-07-09 RX ORDER — TORSEMIDE 10 MG/1
10 TABLET ORAL DAILY
Qty: 100 TABLET | Refills: 1 | Status: SHIPPED | OUTPATIENT
Start: 2024-07-09

## 2024-07-09 NOTE — TELEPHONE ENCOUNTER
Patient is also looking for a refill on rx torsemide (DEMADEX) 10 mg tablet. Informed patient last refill sent on 05/07/2024 for 90 day. Patient stated he sometimes takes 2 tablets daily instead of 1.

## 2024-07-26 LAB
ALBUMIN SERPL-MCNC: 4.2 G/DL (ref 3.6–5.1)
ALBUMIN/GLOB SERPL: 1.7 (CALC) (ref 1–2.5)
ALP SERPL-CCNC: 80 U/L (ref 35–144)
ALT SERPL-CCNC: 11 U/L (ref 9–46)
AST SERPL-CCNC: 12 U/L (ref 10–35)
BASOPHILS # BLD AUTO: 63 CELLS/UL (ref 0–200)
BASOPHILS NFR BLD AUTO: 0.7 %
BILIRUB SERPL-MCNC: 0.8 MG/DL (ref 0.2–1.2)
BUN SERPL-MCNC: 33 MG/DL (ref 7–25)
BUN/CREAT SERPL: 17 (CALC) (ref 6–22)
CALCIUM SERPL-MCNC: 9.4 MG/DL (ref 8.6–10.3)
CHLORIDE SERPL-SCNC: 103 MMOL/L (ref 98–110)
CO2 SERPL-SCNC: 29 MMOL/L (ref 20–32)
CREAT SERPL-MCNC: 1.9 MG/DL (ref 0.7–1.28)
EOSINOPHIL # BLD AUTO: 288 CELLS/UL (ref 15–500)
EOSINOPHIL NFR BLD AUTO: 3.2 %
ERYTHROCYTE [DISTWIDTH] IN BLOOD BY AUTOMATED COUNT: 13.2 % (ref 11–15)
GFR/BSA.PRED SERPLBLD CYS-BASED-ARV: 36 ML/MIN/1.73M2
GLOBULIN SER CALC-MCNC: 2.5 G/DL (CALC) (ref 1.9–3.7)
GLUCOSE SERPL-MCNC: 92 MG/DL (ref 65–99)
HCT VFR BLD AUTO: 38.8 % (ref 38.5–50)
HGB BLD-MCNC: 13.3 G/DL (ref 13.2–17.1)
LYMPHOCYTES # BLD AUTO: 2502 CELLS/UL (ref 850–3900)
LYMPHOCYTES NFR BLD AUTO: 27.8 %
MCH RBC QN AUTO: 30.9 PG (ref 27–33)
MCHC RBC AUTO-ENTMCNC: 34.3 G/DL (ref 32–36)
MCV RBC AUTO: 90.2 FL (ref 80–100)
MONOCYTES # BLD AUTO: 612 CELLS/UL (ref 200–950)
MONOCYTES NFR BLD AUTO: 6.8 %
NEUTROPHILS # BLD AUTO: 5535 CELLS/UL (ref 1500–7800)
NEUTROPHILS NFR BLD AUTO: 61.5 %
PLATELET # BLD AUTO: 301 THOUSAND/UL (ref 140–400)
PMV BLD REES-ECKER: 9.5 FL (ref 7.5–12.5)
POTASSIUM SERPL-SCNC: 4.6 MMOL/L (ref 3.5–5.3)
PROT SERPL-MCNC: 6.7 G/DL (ref 6.1–8.1)
RBC # BLD AUTO: 4.3 MILLION/UL (ref 4.2–5.8)
SODIUM SERPL-SCNC: 140 MMOL/L (ref 135–146)
URATE SERPL-MCNC: 5.3 MG/DL (ref 4–8)
WBC # BLD AUTO: 9 THOUSAND/UL (ref 3.8–10.8)

## 2024-08-19 ENCOUNTER — OFFICE VISIT (OUTPATIENT)
Age: 79
End: 2024-08-19
Payer: MEDICARE

## 2024-08-19 VITALS
SYSTOLIC BLOOD PRESSURE: 120 MMHG | BODY MASS INDEX: 31.57 KG/M2 | DIASTOLIC BLOOD PRESSURE: 88 MMHG | HEART RATE: 111 BPM | WEIGHT: 196.4 LBS | TEMPERATURE: 97.6 F | HEIGHT: 66 IN | OXYGEN SATURATION: 97 %

## 2024-08-19 DIAGNOSIS — N31.9 URINARY BLADDER NEUROGENIC DYSFUNCTION: ICD-10-CM

## 2024-08-19 DIAGNOSIS — F11.90 CHRONIC, CONTINUOUS USE OF OPIOIDS: ICD-10-CM

## 2024-08-19 DIAGNOSIS — Z87.39 HISTORY OF GOUT: ICD-10-CM

## 2024-08-19 DIAGNOSIS — M16.12 PRIMARY OSTEOARTHRITIS OF LEFT HIP: ICD-10-CM

## 2024-08-19 DIAGNOSIS — R60.0 EDEMA OF LEFT LOWER LEG: ICD-10-CM

## 2024-08-19 DIAGNOSIS — N18.32 STAGE 3B CHRONIC KIDNEY DISEASE (HCC): ICD-10-CM

## 2024-08-19 DIAGNOSIS — Z00.00 MEDICARE ANNUAL WELLNESS VISIT, SUBSEQUENT: Primary | ICD-10-CM

## 2024-08-19 DIAGNOSIS — L82.1 SEBORRHEIC KERATOSES: ICD-10-CM

## 2024-08-19 PROCEDURE — G0439 PPPS, SUBSEQ VISIT: HCPCS | Performed by: INTERNAL MEDICINE

## 2024-08-19 PROCEDURE — 99214 OFFICE O/P EST MOD 30 MIN: CPT | Performed by: INTERNAL MEDICINE

## 2024-08-19 NOTE — PATIENT INSTRUCTIONS
Medicare Preventive Visit Patient Instructions  Thank you for completing your Welcome to Medicare Visit or Medicare Annual Wellness Visit today. Your next wellness visit will be due in one year (8/20/2025).  The screening/preventive services that you may require over the next 5-10 years are detailed below. Some tests may not apply to you based off risk factors and/or age. Screening tests ordered at today's visit but not completed yet may show as past due. Also, please note that scanned in results may not display below.  Preventive Screenings:  Service Recommendations Previous Testing/Comments   Colorectal Cancer Screening  Colonoscopy    Fecal Occult Blood Test (FOBT)/Fecal Immunochemical Test (FIT)  Fecal DNA/Cologuard Test  Flexible Sigmoidoscopy Age: 45-75 years old   Colonoscopy: every 10 years (May be performed more frequently if at higher risk)  OR  FOBT/FIT: every 1 year  OR  Cologuard: every 3 years  OR  Sigmoidoscopy: every 5 years  Screening may be recommended earlier than age 45 if at higher risk for colorectal cancer. Also, an individualized decision between you and your healthcare provider will decide whether screening between the ages of 76-85 would be appropriate. Colonoscopy: 10/08/2018  FOBT/FIT: Not on file  Cologuard: Not on file  Sigmoidoscopy: Not on file          Prostate Cancer Screening Individualized decision between patient and health care provider in men between ages of 55-69   Medicare will cover every 12 months beginning on the day after your 50th birthday PSA: 4.16 ng/mL     Screening Not Indicated     Hepatitis C Screening Once for adults born between 1945 and 1965  More frequently in patients at high risk for Hepatitis C Hep C Antibody: 08/05/2019    Screening Current   Diabetes Screening 1-2 times per year if you're at risk for diabetes or have pre-diabetes Fasting glucose: No results in last 5 years (No results in last 5 years)  A1C: 5.1 % of total Hgb (12/9/2020)  Screening Current    Cholesterol Screening Once every 5 years if you don't have a lipid disorder. May order more often based on risk factors. Lipid panel: 12/09/2020  Screening Current      Other Preventive Screenings Covered by Medicare:  Abdominal Aortic Aneurysm (AAA) Screening: covered once if your at risk. You're considered to be at risk if you have a family history of AAA or a male between the age of 65-75 who smoking at least 100 cigarettes in your lifetime.  Lung Cancer Screening: covers low dose CT scan once per year if you meet all of the following conditions: (1) Age 55-77; (2) No signs or symptoms of lung cancer; (3) Current smoker or have quit smoking within the last 15 years; (4) You have a tobacco smoking history of at least 20 pack years (packs per day x number of years you smoked); (5) You get a written order from a healthcare provider.  Glaucoma Screening: covered annually if you're considered high risk: (1) You have diabetes OR (2) Family history of glaucoma OR (3)  aged 50 and older OR (4)  American aged 65 and older  Osteoporosis Screening: covered every 2 years if you meet one of the following conditions: (1) Have a vertebral abnormality; (2) On glucocorticoid therapy for more than 3 months; (3) Have primary hyperparathyroidism; (4) On osteoporosis medications and need to assess response to drug therapy.  HIV Screening: covered annually if you're between the age of 15-65. Also covered annually if you are younger than 15 and older than 65 with risk factors for HIV infection. For pregnant patients, it is covered up to 3 times per pregnancy.    Immunizations:  Immunization Recommendations   Influenza Vaccine Annual influenza vaccination during flu season is recommended for all persons aged >= 6 months who do not have contraindications   Pneumococcal Vaccine   * Pneumococcal conjugate vaccine = PCV13 (Prevnar 13), PCV15 (Vaxneuvance), PCV20 (Prevnar 20)  * Pneumococcal polysaccharide vaccine =  PPSV23 (Pneumovax) Adults 19-65 yo with certain risk factors or if 65+ yo  If never received any pneumonia vaccine: recommend Prevnar 20 (PCV20)  Give PCV20 if previously received 1 dose of PCV13 or PPSV23   Hepatitis B Vaccine 3 dose series if at intermediate or high risk (ex: diabetes, end stage renal disease, liver disease)   Respiratory syncytial virus (RSV) Vaccine - COVERED BY MEDICARE PART D  * RSVPreF3 (Arexvy) CDC recommends that adults 60 years of age and older may receive a single dose of RSV vaccine using shared clinical decision-making (SCDM)   Tetanus (Td) Vaccine - COST NOT COVERED BY MEDICARE PART B Following completion of primary series, a booster dose should be given every 10 years to maintain immunity against tetanus. Td may also be given as tetanus wound prophylaxis.   Tdap Vaccine - COST NOT COVERED BY MEDICARE PART B Recommended at least once for all adults. For pregnant patients, recommended with each pregnancy.   Shingles Vaccine (Shingrix) - COST NOT COVERED BY MEDICARE PART B  2 shot series recommended in those 19 years and older who have or will have weakened immune systems or those 50 years and older     Health Maintenance Due:      Topic Date Due   • Hepatitis C Screening  Completed   • Colorectal Cancer Screening  Discontinued     Immunizations Due:      Topic Date Due   • Influenza Vaccine (1) 09/01/2024     Advance Directives   What are advance directives?  Advance directives are legal documents that state your wishes and plans for medical care. These plans are made ahead of time in case you lose your ability to make decisions for yourself. Advance directives can apply to any medical decision, such as the treatments you want, and if you want to donate organs.   What are the types of advance directives?  There are many types of advance directives, and each state has rules about how to use them. You may choose a combination of any of the following:  Living will:  This is a written  record of the treatment you want. You can also choose which treatments you do not want, which to limit, and which to stop at a certain time. This includes surgery, medicine, IV fluid, and tube feedings.   Durable power of  for healthcare (DPAHC):  This is a written record that states who you want to make healthcare choices for you when you are unable to make them for yourself. This person, called a proxy, is usually a family member or a friend. You may choose more than 1 proxy.  Do not resuscitate (DNR) order:  A DNR order is used in case your heart stops beating or you stop breathing. It is a request not to have certain forms of treatment, such as CPR. A DNR order may be included in other types of advance directives.  Medical directive:  This covers the care that you want if you are in a coma, near death, or unable to make decisions for yourself. You can list the treatments you want for each condition. Treatment may include pain medicine, surgery, blood transfusions, dialysis, IV or tube feedings, and a ventilator (breathing machine).  Values history:  This document has questions about your views, beliefs, and how you feel and think about life. This information can help others choose the care that you would choose.  Why are advance directives important?  An advance directive helps you control your care. Although spoken wishes may be used, it is better to have your wishes written down. Spoken wishes can be misunderstood, or not followed. Treatments may be given even if you do not want them. An advance directive may make it easier for your family to make difficult choices about your care.   Weight Management   Why it is important to manage your weight:  Being overweight increases your risk of health conditions such as heart disease, high blood pressure, type 2 diabetes, and certain types of cancer. It can also increase your risk for osteoarthritis, sleep apnea, and other respiratory problems. Aim for a slow,  steady weight loss. Even a small amount of weight loss can lower your risk of health problems.  How to lose weight safely:  A safe and healthy way to lose weight is to eat fewer calories and get regular exercise. You can lose up about 1 pound a week by decreasing the number of calories you eat by 500 calories each day.   Healthy meal plan for weight management:  A healthy meal plan includes a variety of foods, contains fewer calories, and helps you stay healthy. A healthy meal plan includes the following:  Eat whole-grain foods more often.  A healthy meal plan should contain fiber. Fiber is the part of grains, fruits, and vegetables that is not broken down by your body. Whole-grain foods are healthy and provide extra fiber in your diet. Some examples of whole-grain foods are whole-wheat breads and pastas, oatmeal, brown rice, and bulgur.  Eat a variety of vegetables every day.  Include dark, leafy greens such as spinach, kale, debbie greens, and mustard greens. Eat yellow and orange vegetables such as carrots, sweet potatoes, and winter squash.   Eat a variety of fruits every day.  Choose fresh or canned fruit (canned in its own juice or light syrup) instead of juice. Fruit juice has very little or no fiber.  Eat low-fat dairy foods.  Drink fat-free (skim) milk or 1% milk. Eat fat-free yogurt and low-fat cottage cheese. Try low-fat cheeses such as mozzarella and other reduced-fat cheeses.  Choose meat and other protein foods that are low in fat.  Choose beans or other legumes such as split peas or lentils. Choose fish, skinless poultry (chicken or turkey), or lean cuts of red meat (beef or pork). Before you cook meat or poultry, cut off any visible fat.   Use less fat and oil.  Try baking foods instead of frying them. Add less fat, such as margarine, sour cream, regular salad dressing and mayonnaise to foods. Eat fewer high-fat foods. Some examples of high-fat foods include french fries, doughnuts, ice cream, and  cakes.  Eat fewer sweets.  Limit foods and drinks that are high in sugar. This includes candy, cookies, regular soda, and sweetened drinks.  Exercise:  Exercise at least 30 minutes per day on most days of the week. Some examples of exercise include walking, biking, dancing, and swimming. You can also fit in more physical activity by taking the stairs instead of the elevator or parking farther away from stores. Ask your healthcare provider about the best exercise plan for you.   Narcotic (Opioid) Safety    Use narcotics safely:  Take prescribed narcotics exactly as directed  Do not give narcotics to others or take narcotics that belong to someone else  Do not mix narcotics without medicines or alcohol  Do not drive or operate heavy machinery after you take the narcotic  Monitor for side effects and notify your healthcare provider if you experienced side effects such as nausea, sleepiness, itching, or trouble thinking clearly.    Manage constipation:    Constipation is the most common side effect of narcotic medicine. Constipation is when you have hard, dry bowel movements, or you go longer than usual between bowel movements. Tell your healthcare provider about all changes in your bowel movements while you are taking narcotics. He or she may recommend laxative medicine to help you have a bowel movement. He or she may also change the kind of narcotic you are taking, or change when you take it. The following are more ways you can prevent or relieve constipation:    Drink liquids as directed.  You may need to drink extra liquids to help soften and move your bowels. Ask how much liquid to drink each day and which liquids are best for you.  Eat high-fiber foods.  This may help decrease constipation by adding bulk to your bowel movements. High-fiber foods include fruits, vegetables, whole-grain breads and cereals, and beans. Your healthcare provider or dietitian can help you create a high-fiber meal plan. Your provider may  also recommend a fiber supplement if you cannot get enough fiber from food.  Exercise regularly.  Regular physical activity can help stimulate your intestines. Walking is a good exercise to prevent or relieve constipation. Ask which exercises are best for you.  Schedule a time each day to have a bowel movement.  This may help train your body to have regular bowel movements. Bend forward while you are on the toilet to help move the bowel movement out. Sit on the toilet for at least 10 minutes, even if you do not have a bowel movement.    Store narcotics safely:   Store narcotics where others cannot easily get them.  Keep them in a locked cabinet or secure area. Do not  keep them in a purse or other bag you carry with you. A person may be looking for something else and find the narcotics.  Make sure narcotics are stored out of the reach of children.  A child can easily overdose on narcotics. Narcotics may look like candy to a small child.    The best way to dispose of narcotics:      The laws vary by country and area. In the [place], the best way is to return the narcotics through a take-back program. This program is offered by the [place] Drug Enforcement Agency (GARC[employer]). The following are options for using the program:  Take the narcotics to a GARC[employer]A collection site.  The site is often a law enforcement center. Call your local law enforcement center for scheduled take-back days in your area. You will be given information on where to go if the collection site is in a different location.  Take the narcotics to an approved pharmacy or hospital.  A pharmacy or hospital may be set up as a collection site. You will need to ask if it is a [employer]ARC[employer] collection site if you were not directed there. A pharmacy or doctor's office may not be able to take back narcotics unless it is a GARC[employer]A site.  Use a mail-back system.  This means you are given containers to put the narcotics into. You will then mail them in the containers.  Use a take-back  drop box.  This is a place to leave the narcotics at any time. People and animals will not be able to get into the box. Your local law enforcement agency can tell you where to find a drop box in your area.    Other ways to manage pain:   Ask your healthcare provider about non-narcotic medicines to control pain.  Nonprescription medicines include NSAIDs (such as ibuprofen) and acetaminophen. Prescription medicines include muscle relaxers, antidepressants, and steroids.  Pain may be managed without any medicines.  Some ways to relieve pain include massage, aromatherapy, or meditation. Physical or occupational therapy may also help.    For more information:   Drug Enforcement Administration  [address] [place] [address]  Phone: 0- 923 - [phone]  Web Address: https://www.deadiversion.[facility] [place].gov/drug_disposal/    [place] Food and Drug Administration  [address] [place] [address]  Phone: 6- 891 - 415-3550  Web Address: http://www.fda.gov     © Copyright [employer] 2018 Information is for End User's use only and may not be sold, redistributed or otherwise used for commercial purposes. All illustrations and images included in CareNotes® are the copyrighted property of [employer] or [employer]

## 2024-08-19 NOTE — ASSESSMENT & PLAN NOTE
Patient is starting to develop some trophic changes some fibrous nodules posteriorly in the distal third of his left leg

## 2024-08-19 NOTE — ASSESSMENT & PLAN NOTE
Has 2 seborrheic lesions of his medial left leg posterior to the distal thigh, each lesion approximately 7- 8 cm in diameter.  Not irritated.

## 2024-08-19 NOTE — ASSESSMENT & PLAN NOTE
Lab Results   Component Value Date    EGFR 36 (L) 07/26/2024    EGFR 37 (L) 01/31/2024    EGFR 40 (L) 08/01/2023    CREATININE 1.90 (H) 07/26/2024    CREATININE 1.87 (H) 01/31/2024    CREATININE 1.72 (H) 08/01/2023   Continue to monitor on diuretic therapy.

## 2024-08-19 NOTE — PROGRESS NOTES
Ambulatory Visit  Name: Lázaro Champion      : 1945      MRN: [id number]  Encounter Provider: Urbano Bird MD  Encounter Date: 2024   Encounter department: [facility]IMARY [facility] [place]    Assessment & Plan   1. Medicare annual wellness visit, subsequent  Assessment & Plan:  Patient is up to date with age-appropriate immunizations.  He would likely benefit from a Prevnar 20 as his Pneumovax was 10 years ago.  Colonoscopy in 2018 was positive for (2) tubular adenomas, each measuring 3 mm diameter.  Should likely have follow-up colonoscopy now.  2. Stage 3b chronic kidney disease (HCC)  Assessment & Plan:  Lab Results   Component Value Date    EGFR 36 (L) 2024    EGFR 37 (L) 2024    EGFR 40 (L) 2023    CREATININE 1.90 (H) 2024    CREATININE 1.87 (H) 2024    CREATININE 1.72 (H) 2023   Continue to monitor on diuretic therapy.  Orders:  -     CBC and differential; Future; Expected date: 2025  -     Comprehensive metabolic panel; Future; Expected date: 2025  -     CBC and differential  -     Comprehensive metabolic panel  3. Primary osteoarthritis of left hip  Assessment & Plan:  No major deterioration.  Pain is controlled with tramadol.  4. Edema of left lower leg  Assessment & Plan:  Patient is starting to develop some trophic changes some fibrous nodules posteriorly in the distal third of his left leg  5. Urinary bladder neurogenic dysfunction  Assessment & Plan:  Follows with neurology.  Has a catheter exchange every month.  6. History of gout  Assessment & Plan:  Uric acid levels are stable we will continue to follow-up.  Orders:  -     Uric acid; Future; Expected date: 2025  -     Uric acid  7. Chronic, continuous use of opioids  Assessment & Plan:  Significant relief of pain with the use of tramadol.  8. Seborrheic keratoses  Assessment & Plan:  Has 2 seborrheic lesions of his medial left leg posterior to the distal  thigh, each lesion approximately 7- 8 cm in diameter.  Not irritated.       Preventive health issues were discussed with patient, and age appropriate screening tests were ordered as noted in patient's After Visit Summary. Personalized health advice and appropriate referrals for health education or preventive services given if needed, as noted in patient's After Visit Summary.    History of Present Illness     Patient presents to the office for a Medicare wellness visit along with a follow-up.  He is doing generally well.  He continues with chronic left hip pain due to osteoarthritis but does not wish to have a hip replacement therapy despite severe degenerative changes.  Tramadol provides him significant pain relief to allow him to function without much difficulty.  He continues with a Nolasco catheter for urinary obstruction due to BPH.  Follows with [facility] urology.  He has monthly catheter change.  He continues with chronic left lower extremity edema consequent to venous insufficiency.  There is some mild dependent erythema but no evidence of cellulitis.  He is concerned about some skin changes.  He denies any shortness of breath, fevers chills bladder spasms, etc. labs are reviewed and renal function is relatively unchanged.  CBC is normal uric acid is 5.3.  CMP is remarkable for elevated BUN/creatinine consistent with stage IIIb CKD       Patient Care Team:  Urbano Bird MD as PCP - General  Urbano Bird MD as PCP - PCP-[facility] ([facility]  MD Dereje Aponte MD Zheng Lin, MD as Endoscopist    Review of Systems   Constitutional: Negative.    HENT: Negative.     Eyes: Negative.    Respiratory: Negative.     Cardiovascular:  Positive for leg swelling (Chronic left lower extremity edema).   Gastrointestinal: Negative.    Endocrine: Negative.    Genitourinary:         Chronic indwelling Nolasco catheter   Musculoskeletal:  Positive for arthralgias (Left hip).   Skin:          Early trophic changes of the distal left lower extremity posteriorly   Allergic/Immunologic: Negative.    Neurological: Negative.    Hematological: Negative.    Psychiatric/Behavioral: Negative.       Medical History Reviewed by provider this encounter:       Annual Wellness Visit Questionnaire   Lázaro is here for his Subsequent Wellness visit.     Health Risk Assessment:   Patient rates overall health as good. Patient feels that their physical health rating is same. Patient is satisfied with their life. Eyesight was rated as same. Hearing was rated as same. Patient feels that their emotional and mental health rating is same. Patients states they are never, rarely angry. Patient states they are sometimes unusually tired/fatigued. Pain experienced in the last 7 days has been none. Patient states that he has experienced no weight loss or gain in last 6 months.     Depression Screening:   PHQ-2 Score: 0      Fall Risk Screening:   In the past year, patient has experienced: no history of falling in past year      Home Safety:  Patient has trouble with stairs inside or outside of their home. Patient has working smoke alarms and has working carbon monoxide detector. Home safety hazards include: none.     Nutrition:   Current diet is Regular and Frequent junk food.     Medications:   Patient is not currently taking any over-the-counter supplements. Patient is able to manage medications.     Activities of Daily Living (ADLs)/Instrumental Activities of Daily Living (IADLs):   Walk and transfer into and out of bed and chair?: Yes  Dress and groom yourself?: Yes    Bathe or shower yourself?: Yes    Feed yourself? Yes  Do your laundry/housekeeping?: Yes  Manage your money, pay your bills and track your expenses?: Yes  Make your own meals?: Yes    Do your own shopping?: Yes    Previous Hospitalizations:   Any hospitalizations or ED visits within the last 12 months?: No      Advance Care Planning:   Living will: Yes    Durable  POA for healthcare: No    Advanced directive: No    Advanced directive counseling given: Yes    ACP document given: Yes    Patient declined ACP directive: No    End of Life Decisions reviewed with patient: Yes    Provider agrees with end of life decisions: Yes      Cognitive Screening:   Provider or family/friend/caregiver concerned regarding cognition?: No    PREVENTIVE SCREENINGS      Cardiovascular Screening:    General: Screening Current      Diabetes Screening:     General: Screening Current      Colorectal Cancer Screening:     General: Screening Current and Screening Not Indicated      Prostate Cancer Screening:    General: Screening Not Indicated and Screening Current      Osteoporosis Screening:    General: Screening Not Indicated      Abdominal Aortic Aneurysm (AAA) Screening:    Risk factors include: tobacco use        General: Screening Not Indicated      Lung Cancer Screening:     General: Screening Not Indicated      Hepatitis C Screening:    General: Screening Current    Screening, Brief Intervention, and Referral to Treatment (SBIRT)    Screening  Typical number of drinks in a day: 0  Typical number of drinks in a week: 0  Interpretation: Low risk drinking behavior.    Review of Current Opioid Use    Opioid Risk Tool (ORT) Interpretation: Complete Opioid Risk Tool (ORT)    Social Determinants of Health     Financial Resource Strain: Medium Risk (4/28/2023)    Overall Financial Resource Strain (CARDIA)     Difficulty of Paying Living Expenses: Somewhat hard   Food Insecurity: No Food Insecurity (8/19/2024)    Hunger Vital Sign     Worried About Running Out of Food in the Last Year: Never true     Ran Out of Food in the Last Year: Never true   Transportation Needs: No Transportation Needs (8/19/2024)    PRAPARE - Transportation     Lack of Transportation (Medical): No     Lack of Transportation (Non-Medical): No   Housing Stability: Low Risk  (8/19/2024)    Housing Stability Vital Sign     Unable to  "Pay for Housing in the Last Year: No     Number of Times Moved in the Last Year: 0     Homeless in the Last Year: No   Utilities: Not At Risk (8/19/2024)    [facility] Utilities     Threatened with loss of utilities: No     No results found.    Objective     /88 (BP Location: Left arm, Patient Position: Sitting, Cuff Size: Standard)   Pulse (!) 111   Temp 97.6 °F (36.4 °C) (Temporal)   Ht 5' 5.98\" (1.676 m)   Wt 89.1 kg (196 lb 6.4 oz)   SpO2 97%   BMI 31.72 kg/m²     Physical Exam  Vitals reviewed.   Constitutional:       General: He is not in acute distress.     Appearance: Normal appearance. He is obese. He is not ill-appearing, toxic-appearing or diaphoretic.   HENT:      Head: Normocephalic and atraumatic.      Right Ear: External ear normal. There is no impacted cerumen.      Left Ear: External ear normal.      Nose: Nose normal. No congestion or rhinorrhea.      Mouth/Throat:      Mouth: Mucous membranes are moist.      Pharynx: Oropharynx is clear.   Eyes:      General: No scleral icterus.     Conjunctiva/sclera: Conjunctivae normal.      Pupils: Pupils are equal, round, and reactive to light.   Cardiovascular:      Rate and Rhythm: Normal rate and regular rhythm.      Heart sounds: Normal heart sounds. No murmur heard.  Pulmonary:      Effort: Pulmonary effort is normal. No respiratory distress.      Breath sounds: Normal breath sounds.   Abdominal:      General: Abdomen is flat. Bowel sounds are normal. There is no distension.      Tenderness: There is no abdominal tenderness.      Comments: Indwelling Nolasco catheter noted   Musculoskeletal:      Cervical back: Neck supple.      Left lower leg: Edema (2+ pretibial edema) present.   Lymphadenopathy:      Cervical: No cervical adenopathy.   Skin:     Findings: Erythema (Mild erythema of the distal left lower extremity.) present.   Neurological:      General: No focal deficit present.      Mental Status: He is alert and oriented to person, place, and " time. Mental status is at baseline.   Psychiatric:         Mood and Affect: Mood normal.         Behavior: Behavior normal.         Thought Content: Thought content normal.         Judgment: Judgment normal.

## 2024-08-19 NOTE — ASSESSMENT & PLAN NOTE
Patient is up to date with age-appropriate immunizations.  He would likely benefit from a Prevnar 20 as his Pneumovax was 10 years ago.  Colonoscopy in 2018 was positive for (2) tubular adenomas, each measuring 3 mm diameter.  Should likely have follow-up colonoscopy now.

## 2024-09-03 DIAGNOSIS — M16.9 OSTEOARTHRITIS OF HIP, UNSPECIFIED LATERALITY, UNSPECIFIED OSTEOARTHRITIS TYPE: ICD-10-CM

## 2024-09-03 DIAGNOSIS — M16.12 PRIMARY OSTEOARTHRITIS OF LEFT HIP: ICD-10-CM

## 2024-09-03 RX ORDER — TRAMADOL HYDROCHLORIDE 50 MG/1
50 TABLET ORAL EVERY 6 HOURS PRN
Qty: 120 TABLET | Refills: 0 | Status: SHIPPED | OUTPATIENT
Start: 2024-09-03

## 2024-09-03 NOTE — TELEPHONE ENCOUNTER
Reason for call:   [x] Refill   [] Prior Auth  [] Other:     Office:   [x] PCP/Provider - Dr Bird  [] Specialty/Provider -     Medication:   Tramadol 50 mg, 1 q6 prn 120      Pharmacy:   [facility]    Does the patient have enough for 3 days?   [] Yes   [x] No - Send as HP to POD - has 4 pills remaining

## 2024-09-18 PROBLEM — Z00.00 MEDICARE ANNUAL WELLNESS VISIT, SUBSEQUENT: Status: RESOLVED | Noted: 2018-09-17 | Resolved: 2024-09-18

## 2024-11-07 DIAGNOSIS — M10.9 GOUT, ARTHROPATHY: ICD-10-CM

## 2024-11-07 RX ORDER — ALLOPURINOL 300 MG/1
300 TABLET ORAL DAILY
Qty: 90 TABLET | Refills: 1 | Status: SHIPPED | OUTPATIENT
Start: 2024-11-07

## 2024-12-10 DIAGNOSIS — M16.9 OSTEOARTHRITIS OF HIP, UNSPECIFIED LATERALITY, UNSPECIFIED OSTEOARTHRITIS TYPE: ICD-10-CM

## 2024-12-10 DIAGNOSIS — M16.12 PRIMARY OSTEOARTHRITIS OF LEFT HIP: ICD-10-CM

## 2024-12-10 NOTE — TELEPHONE ENCOUNTER
Reason for call:   [x] Refill   [] Prior Auth  [] Other:     Office:   [x] PCP/Provider - Urbano Bird   [] Specialty/Provider -     Medication: traMADol (Ultram) 50 mg tablet     Dose/Frequency: 1 tablet every 6 hours as needed    Quantity: 120    [facility] [place] [place] [facility] [address]    Does the patient have enough for 3 days?   [x] Yes   [] No - Send as HP to POD

## 2024-12-11 RX ORDER — TRAMADOL HYDROCHLORIDE 50 MG/1
50 TABLET ORAL EVERY 6 HOURS PRN
Qty: 120 TABLET | Refills: 0 | Status: SHIPPED | OUTPATIENT
Start: 2024-12-11

## 2025-01-28 LAB
ALBUMIN SERPL-MCNC: 4.4 G/DL (ref 3.6–5.1)
ALBUMIN/GLOB SERPL: 1.8 (CALC) (ref 1–2.5)
ALP SERPL-CCNC: 75 U/L (ref 35–144)
ALT SERPL-CCNC: 10 U/L (ref 9–46)
AST SERPL-CCNC: 12 U/L (ref 10–35)
BASOPHILS # BLD AUTO: 62 CELLS/UL (ref 0–200)
BASOPHILS NFR BLD AUTO: 0.8 %
BILIRUB SERPL-MCNC: 1.5 MG/DL (ref 0.2–1.2)
BUN SERPL-MCNC: 23 MG/DL (ref 7–25)
BUN/CREAT SERPL: 15 (CALC) (ref 6–22)
CALCIUM SERPL-MCNC: 9.6 MG/DL (ref 8.6–10.3)
CHLORIDE SERPL-SCNC: 103 MMOL/L (ref 98–110)
CO2 SERPL-SCNC: 27 MMOL/L (ref 20–32)
CREAT SERPL-MCNC: 1.56 MG/DL (ref 0.7–1.28)
EOSINOPHIL # BLD AUTO: 203 CELLS/UL (ref 15–500)
EOSINOPHIL NFR BLD AUTO: 2.6 %
ERYTHROCYTE [DISTWIDTH] IN BLOOD BY AUTOMATED COUNT: 12.8 % (ref 11–15)
GFR/BSA.PRED SERPLBLD CYS-BASED-ARV: 45 ML/MIN/1.73M2
GLOBULIN SER CALC-MCNC: 2.5 G/DL (CALC) (ref 1.9–3.7)
GLUCOSE SERPL-MCNC: 108 MG/DL (ref 65–99)
HCT VFR BLD AUTO: 44.7 % (ref 38.5–50)
HGB BLD-MCNC: 14.9 G/DL (ref 13.2–17.1)
LYMPHOCYTES # BLD AUTO: 1966 CELLS/UL (ref 850–3900)
LYMPHOCYTES NFR BLD AUTO: 25.2 %
MCH RBC QN AUTO: 31.2 PG (ref 27–33)
MCHC RBC AUTO-ENTMCNC: 33.3 G/DL (ref 32–36)
MCV RBC AUTO: 93.5 FL (ref 80–100)
MONOCYTES # BLD AUTO: 437 CELLS/UL (ref 200–950)
MONOCYTES NFR BLD AUTO: 5.6 %
NEUTROPHILS # BLD AUTO: 5132 CELLS/UL (ref 1500–7800)
NEUTROPHILS NFR BLD AUTO: 65.8 %
PLATELET # BLD AUTO: 320 THOUSAND/UL (ref 140–400)
PMV BLD REES-ECKER: 9.2 FL (ref 7.5–12.5)
POTASSIUM SERPL-SCNC: 4.7 MMOL/L (ref 3.5–5.3)
PROT SERPL-MCNC: 6.9 G/DL (ref 6.1–8.1)
RBC # BLD AUTO: 4.78 MILLION/UL (ref 4.2–5.8)
SODIUM SERPL-SCNC: 139 MMOL/L (ref 135–146)
URATE SERPL-MCNC: 4.9 MG/DL (ref 4–8)
WBC # BLD AUTO: 7.8 THOUSAND/UL (ref 3.8–10.8)

## 2025-02-04 DIAGNOSIS — N18.31 STAGE 3A CHRONIC KIDNEY DISEASE (HCC): ICD-10-CM

## 2025-02-04 DIAGNOSIS — R60.0 PERIPHERAL EDEMA: ICD-10-CM

## 2025-02-04 NOTE — TELEPHONE ENCOUNTER
Reason for call:   [x] Refill   [] Prior Auth  [] Other:     Office:   [x] PCP/Provider -   [] Specialty/Provider -     Medication:   - Torsemide 10mg- take 1 tablet by mouth daily      [facility] [address] [place]    Does the patient have enough for 3 days?   [x] Yes   [] No - Send as HP to POD     hiatel hernia present and sl large, may get big with age, hard to recommend repair.    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  Laying down makes will collapse right lung more.      Could check lung capacity laying and standing to determine how bad impairment is now.  Diaphragm can recover, if new, within a yr in most??????    May have had diaphragm problem as youth.       Elevation of head/chest would minimize effect of paralyzed diaphragm.     Bending over, tight pants, submerged in water - may worsen breathing.    You are no longer having breathing problems - no short breath.

## 2025-02-05 ENCOUNTER — RA CDI HCC (OUTPATIENT)
Dept: OTHER | Facility: HOSPITAL | Age: 80
End: 2025-02-05

## 2025-02-05 RX ORDER — TORSEMIDE 10 MG/1
10 TABLET ORAL DAILY
Qty: 100 TABLET | Refills: 1 | Status: SHIPPED | OUTPATIENT
Start: 2025-02-05

## 2025-02-24 ENCOUNTER — OFFICE VISIT (OUTPATIENT)
Age: 80
End: 2025-02-24
Payer: MEDICARE

## 2025-02-24 VITALS
SYSTOLIC BLOOD PRESSURE: 128 MMHG | BODY MASS INDEX: 30.86 KG/M2 | TEMPERATURE: 99.6 F | OXYGEN SATURATION: 98 % | HEART RATE: 113 BPM | DIASTOLIC BLOOD PRESSURE: 64 MMHG | HEIGHT: 66 IN | WEIGHT: 192 LBS

## 2025-02-24 DIAGNOSIS — R97.20 ELEVATED PSA: ICD-10-CM

## 2025-02-24 DIAGNOSIS — I49.3 FREQUENT UNIFOCAL PVCS: ICD-10-CM

## 2025-02-24 DIAGNOSIS — R60.0 EDEMA OF LEFT LOWER LEG: ICD-10-CM

## 2025-02-24 DIAGNOSIS — M16.12 PRIMARY OSTEOARTHRITIS OF LEFT HIP: ICD-10-CM

## 2025-02-24 DIAGNOSIS — N18.31 STAGE 3A CHRONIC KIDNEY DISEASE (HCC): ICD-10-CM

## 2025-02-24 DIAGNOSIS — L82.0 SEBORRHEIC KERATOSES, INFLAMED: Primary | ICD-10-CM

## 2025-02-24 DIAGNOSIS — R00.0 TACHYCARDIA: ICD-10-CM

## 2025-02-24 DIAGNOSIS — N31.9 URINARY BLADDER NEUROGENIC DYSFUNCTION: ICD-10-CM

## 2025-02-24 PROBLEM — S81.812D: Status: RESOLVED | Noted: 2022-09-27 | Resolved: 2025-02-24

## 2025-02-24 PROCEDURE — 99214 OFFICE O/P EST MOD 30 MIN: CPT | Performed by: INTERNAL MEDICINE

## 2025-02-24 PROCEDURE — 93000 ELECTROCARDIOGRAM COMPLETE: CPT | Performed by: INTERNAL MEDICINE

## 2025-02-24 PROCEDURE — G2211 COMPLEX E/M VISIT ADD ON: HCPCS | Performed by: INTERNAL MEDICINE

## 2025-02-24 RX ORDER — METOPROLOL SUCCINATE 25 MG/1
25 TABLET, EXTENDED RELEASE ORAL DAILY
Qty: 30 TABLET | Refills: 5 | Status: SHIPPED | OUTPATIENT
Start: 2025-02-24

## 2025-02-24 NOTE — PROGRESS NOTES
Name: Lázaro Champion      : 1945      MRN: [id number]  Encounter Provider: Urbano Bird MD  Encounter Date: 2025   Encounter department: [facility] [place]  :  Assessment & Plan  Seborrheic keratoses, inflamed  Referral to dermatology.  Orders:    Ambulatory Referral to Dermatology; Future    Tachycardia  EKG: there are no previous tracings available for comparison, sinus tachycardia, frequent PVC's noted.   Orders:    metoprolol succinate (TOPROL-XL) 25 mg 24 hr tablet; Take 1 tablet (25 mg total) by mouth daily    Magnesium; Future    CBC and differential; Future    Comprehensive metabolic panel; Future    Stage 3a chronic kidney disease (HCC)  Lab Results   Component Value Date    EGFR 45 (L) 2025    EGFR 36 (L) 2024    EGFR 37 (L) 2024    CREATININE 1.56 (H) 2025    CREATININE 1.90 (H) 2024    CREATININE 1.87 (H) 2024   No change in current therapy.         Elevated PSA  Will continue to monitor.       Edema of left lower leg  No changes in therapy       Primary osteoarthritis of left hip  Continue to monitor for pain.  Currently the patient is experiencing very little left hip pain despite severe osteoarthritic changes.       Urinary bladder neurogenic dysfunction  Indwelling Nolasco catheter for neurogenic bladder.       Frequent unifocal PVCs  Metoprolol succinate 25 mg daily.  Will check metabolic panel, potassium magnesium levels prior to next visit  Orders:    metoprolol succinate (TOPROL-XL) 25 mg 24 hr tablet; Take 1 tablet (25 mg total) by mouth daily    Magnesium; Future    CBC and differential; Future    Comprehensive metabolic panel; Future           History of Present Illness   Presents to the office for a follow-up visit.  He is feeling well.  He states that his left hip pain is significantly decreased.  He is not taking tramadol nearly as bad as previously perhaps even just once a day.  He continues with an  "indwelling Nolasco catheter.  He does not wish to have a suprapubic catheter placed.  The seborrheic lesions on his left inner thigh have become somewhat irritated.  His labs are reviewed.  His PSA is stable at 4.  Uric acid is normal.  Metabolic panel does disclose some improvement in his renal function to stage IIIa with a BUN of 23 and creatinine of 1.56.  eGFR 45.  CBC is within normal limits tachycardic and irregular.  Patient without any complaints of any chest pain, dyspnea, palpitations, dizziness or light-headedness.    , Dyspnea, diaphoresis etc.  Review of Systems   Constitutional: Negative.  Negative for activity change, appetite change, chills, diaphoresis, fatigue, fever and unexpected weight change.   HENT: Negative.     Eyes: Negative.    Respiratory: Negative.     Cardiovascular: Negative.    Gastrointestinal: Negative.    Endocrine: Negative.    Genitourinary: Negative.         Indwelling Nolasco catheter   Musculoskeletal: Negative.    Skin: Negative.         Seborrheic keratosis the left distal medial thigh   Allergic/Immunologic: Negative.    Neurological: Negative.    Hematological: Negative.    Psychiatric/Behavioral: Negative.  The patient is not nervous/anxious.        Objective   /64 (BP Location: Left arm, Patient Position: Sitting, Cuff Size: Standard)   Pulse (!) 113   Temp 99.6 °F (37.6 °C) (Temporal)   Ht 5' 5.95\" (1.675 m)   Wt 87.1 kg (192 lb)   SpO2 98%   BMI 31.04 kg/m²      Physical Exam  Vitals reviewed.   Constitutional:       General: He is not in acute distress.     Appearance: Normal appearance. He is normal weight. He is not ill-appearing, toxic-appearing or diaphoretic.   HENT:      Head: Normocephalic and atraumatic.      Right Ear: External ear normal.      Left Ear: External ear normal.      Nose: Nose normal.      Mouth/Throat:      Mouth: Mucous membranes are moist.      Pharynx: Oropharynx is clear.   Eyes:      General: No scleral icterus.     " Conjunctiva/sclera: Conjunctivae normal.      Pupils: Pupils are equal, round, and reactive to light.   Neck:      Vascular: No carotid bruit.   Cardiovascular:      Rate and Rhythm: Tachycardia present. Rhythm irregular.      Pulses: Normal pulses.      Heart sounds: Normal heart sounds.   Pulmonary:      Effort: Pulmonary effort is normal. No respiratory distress.      Breath sounds: Normal breath sounds.   Abdominal:      General: Abdomen is flat. Bowel sounds are normal. There is no distension.      Tenderness: There is no abdominal tenderness.   Musculoskeletal:      Cervical back: Neck supple.      Right lower leg: Edema (1+ right lower extremity edema) present.      Left lower leg: Edema (2+ left lower extremity edema) present.   Lymphadenopathy:      Cervical: No cervical adenopathy.   Skin:     General: Skin is warm.      Capillary Refill: Capillary refill takes less than 2 seconds.      Coloration: Skin is not jaundiced.      Findings: No bruising, erythema or rash.   Neurological:      General: No focal deficit present.      Mental Status: He is alert and oriented to person, place, and time. Mental status is at baseline.   Psychiatric:         Mood and Affect: Mood normal.         Behavior: Behavior normal.         Thought Content: Thought content normal.         Judgment: Judgment normal.

## 2025-02-24 NOTE — ASSESSMENT & PLAN NOTE
Continue to monitor for pain.  Currently the patient is experiencing very little left hip pain despite severe osteoarthritic changes.

## 2025-02-24 NOTE — ASSESSMENT & PLAN NOTE
EKG: there are no previous tracings available for comparison, sinus tachycardia, frequent PVC's noted.   Orders:    metoprolol succinate (TOPROL-XL) 25 mg 24 hr tablet; Take 1 tablet (25 mg total) by mouth daily    Magnesium; Future    CBC and differential; Future    Comprehensive metabolic panel; Future

## 2025-02-24 NOTE — ASSESSMENT & PLAN NOTE
Lab Results   Component Value Date    EGFR 45 (L) 01/27/2025    EGFR 36 (L) 07/26/2024    EGFR 37 (L) 01/31/2024    CREATININE 1.56 (H) 01/27/2025    CREATININE 1.90 (H) 07/26/2024    CREATININE 1.87 (H) 01/31/2024   No change in current therapy.

## 2025-03-21 DIAGNOSIS — R00.0 TACHYCARDIA: ICD-10-CM

## 2025-03-21 DIAGNOSIS — I49.3 FREQUENT UNIFOCAL PVCS: ICD-10-CM

## 2025-03-21 RX ORDER — METOPROLOL SUCCINATE 25 MG/1
25 TABLET, EXTENDED RELEASE ORAL DAILY
Qty: 90 TABLET | Refills: 2 | Status: SHIPPED | OUTPATIENT
Start: 2025-03-21

## 2025-05-04 DIAGNOSIS — M10.9 GOUT, ARTHROPATHY: ICD-10-CM

## 2025-05-05 RX ORDER — ALLOPURINOL 300 MG/1
300 TABLET ORAL DAILY
Qty: 90 TABLET | Refills: 1 | Status: SHIPPED | OUTPATIENT
Start: 2025-05-05

## 2025-07-29 DIAGNOSIS — R60.0 PERIPHERAL EDEMA: ICD-10-CM

## 2025-07-29 DIAGNOSIS — N18.31 STAGE 3A CHRONIC KIDNEY DISEASE (HCC): ICD-10-CM

## 2025-07-30 RX ORDER — TORSEMIDE 10 MG/1
10 TABLET ORAL DAILY
Qty: 90 TABLET | Refills: 1 | Status: SHIPPED | OUTPATIENT
Start: 2025-07-30